# Patient Record
Sex: MALE | Race: WHITE | NOT HISPANIC OR LATINO
[De-identification: names, ages, dates, MRNs, and addresses within clinical notes are randomized per-mention and may not be internally consistent; named-entity substitution may affect disease eponyms.]

---

## 2017-05-11 ENCOUNTER — APPOINTMENT (OUTPATIENT)
Dept: CT IMAGING | Facility: CLINIC | Age: 77
End: 2017-05-11

## 2017-05-11 ENCOUNTER — OUTPATIENT (OUTPATIENT)
Dept: OUTPATIENT SERVICES | Facility: HOSPITAL | Age: 77
LOS: 1 days | End: 2017-05-11
Payer: MEDICARE

## 2017-05-11 DIAGNOSIS — Z00.8 ENCOUNTER FOR OTHER GENERAL EXAMINATION: ICD-10-CM

## 2017-05-11 PROCEDURE — 71250 CT THORAX DX C-: CPT

## 2017-05-22 ENCOUNTER — APPOINTMENT (OUTPATIENT)
Dept: NUCLEAR MEDICINE | Facility: CLINIC | Age: 77
End: 2017-05-22

## 2017-05-22 ENCOUNTER — OUTPATIENT (OUTPATIENT)
Dept: OUTPATIENT SERVICES | Facility: HOSPITAL | Age: 77
LOS: 1 days | End: 2017-05-22

## 2017-05-31 ENCOUNTER — APPOINTMENT (OUTPATIENT)
Dept: THORACIC SURGERY | Facility: CLINIC | Age: 77
End: 2017-05-31

## 2017-05-31 ENCOUNTER — RECORD ABSTRACTING (OUTPATIENT)
Age: 77
End: 2017-05-31

## 2017-05-31 VITALS
OXYGEN SATURATION: 97 % | HEART RATE: 68 BPM | DIASTOLIC BLOOD PRESSURE: 75 MMHG | SYSTOLIC BLOOD PRESSURE: 155 MMHG | HEIGHT: 70 IN | BODY MASS INDEX: 31.78 KG/M2 | RESPIRATION RATE: 16 BRPM | WEIGHT: 222 LBS

## 2017-05-31 DIAGNOSIS — G47.30 SLEEP APNEA, UNSPECIFIED: ICD-10-CM

## 2017-05-31 DIAGNOSIS — K44.9 DIAPHRAGMATIC HERNIA W/OUT OBSTRUCTION OR GANGRENE: ICD-10-CM

## 2017-05-31 DIAGNOSIS — C44.90 UNSPECIFIED MALIGNANT NEOPLASM OF SKIN, UNSPECIFIED: ICD-10-CM

## 2017-06-01 ENCOUNTER — APPOINTMENT (OUTPATIENT)
Dept: PULMONOLOGY | Facility: CLINIC | Age: 77
End: 2017-06-01

## 2017-06-01 VITALS
WEIGHT: 227 LBS | DIASTOLIC BLOOD PRESSURE: 74 MMHG | BODY MASS INDEX: 32.57 KG/M2 | HEART RATE: 69 BPM | SYSTOLIC BLOOD PRESSURE: 148 MMHG | OXYGEN SATURATION: 96 %

## 2017-06-01 DIAGNOSIS — C32.0 MALIGNANT NEOPLASM OF GLOTTIS: ICD-10-CM

## 2017-06-01 DIAGNOSIS — Z01.811 ENCOUNTER FOR PREPROCEDURAL RESPIRATORY EXAMINATION: ICD-10-CM

## 2017-06-05 ENCOUNTER — APPOINTMENT (OUTPATIENT)
Dept: THORACIC SURGERY | Facility: CLINIC | Age: 77
End: 2017-06-05

## 2017-06-05 VITALS
WEIGHT: 227 LBS | RESPIRATION RATE: 16 BRPM | BODY MASS INDEX: 32.5 KG/M2 | HEIGHT: 70 IN | HEART RATE: 76 BPM | DIASTOLIC BLOOD PRESSURE: 65 MMHG | SYSTOLIC BLOOD PRESSURE: 163 MMHG | OXYGEN SATURATION: 94 %

## 2017-06-14 ENCOUNTER — INPATIENT (INPATIENT)
Facility: HOSPITAL | Age: 77
LOS: 4 days | Discharge: HOME | DRG: 683 | End: 2017-06-19
Attending: INTERNAL MEDICINE | Admitting: HOSPITALIST
Payer: MEDICARE

## 2017-06-14 ENCOUNTER — OUTPATIENT (OUTPATIENT)
Dept: OUTPATIENT SERVICES | Facility: HOSPITAL | Age: 77
LOS: 1 days | End: 2017-06-14
Payer: MEDICARE

## 2017-06-14 VITALS
DIASTOLIC BLOOD PRESSURE: 53 MMHG | TEMPERATURE: 98 F | WEIGHT: 222.01 LBS | HEART RATE: 67 BPM | RESPIRATION RATE: 16 BRPM | OXYGEN SATURATION: 97 % | SYSTOLIC BLOOD PRESSURE: 109 MMHG | HEIGHT: 70 IN

## 2017-06-14 VITALS
HEART RATE: 99 BPM | TEMPERATURE: 99 F | RESPIRATION RATE: 16 BRPM | WEIGHT: 224.87 LBS | SYSTOLIC BLOOD PRESSURE: 142 MMHG | DIASTOLIC BLOOD PRESSURE: 79 MMHG | HEIGHT: 70 IN

## 2017-06-14 DIAGNOSIS — Z01.818 ENCOUNTER FOR OTHER PREPROCEDURAL EXAMINATION: ICD-10-CM

## 2017-06-14 DIAGNOSIS — Z90.49 ACQUIRED ABSENCE OF OTHER SPECIFIED PARTS OF DIGESTIVE TRACT: Chronic | ICD-10-CM

## 2017-06-14 DIAGNOSIS — R91.1 SOLITARY PULMONARY NODULE: ICD-10-CM

## 2017-06-14 DIAGNOSIS — C32.0 MALIGNANT NEOPLASM OF GLOTTIS: Chronic | ICD-10-CM

## 2017-06-14 DIAGNOSIS — N17.9 ACUTE KIDNEY FAILURE, UNSPECIFIED: ICD-10-CM

## 2017-06-14 DIAGNOSIS — Z96.659 PRESENCE OF UNSPECIFIED ARTIFICIAL KNEE JOINT: Chronic | ICD-10-CM

## 2017-06-14 DIAGNOSIS — R50.9 FEVER, UNSPECIFIED: ICD-10-CM

## 2017-06-14 LAB
ALBUMIN SERPL ELPH-MCNC: 3.4 G/DL — SIGNIFICANT CHANGE UP (ref 3.3–5.2)
ALP SERPL-CCNC: 60 U/L — SIGNIFICANT CHANGE UP (ref 40–120)
ALT FLD-CCNC: 20 U/L — SIGNIFICANT CHANGE UP
ANION GAP SERPL CALC-SCNC: 18 MMOL/L — HIGH (ref 5–17)
ANION GAP SERPL CALC-SCNC: 20 MMOL/L — HIGH (ref 5–17)
ANISOCYTOSIS BLD QL: SLIGHT — SIGNIFICANT CHANGE UP
APPEARANCE UR: ABNORMAL
APTT BLD: 27.9 SEC — SIGNIFICANT CHANGE UP (ref 27.5–37.4)
AST SERPL-CCNC: 19 U/L — SIGNIFICANT CHANGE UP
BACTERIA # UR AUTO: ABNORMAL
BILIRUB SERPL-MCNC: 1.3 MG/DL — SIGNIFICANT CHANGE UP (ref 0.4–2)
BILIRUB UR-MCNC: ABNORMAL
BUN SERPL-MCNC: 64 MG/DL — HIGH (ref 8–20)
BUN SERPL-MCNC: 72 MG/DL — HIGH (ref 8–20)
CALCIUM SERPL-MCNC: 8.8 MG/DL — SIGNIFICANT CHANGE UP (ref 8.6–10.2)
CALCIUM SERPL-MCNC: 8.8 MG/DL — SIGNIFICANT CHANGE UP (ref 8.6–10.2)
CHLORIDE SERPL-SCNC: 92 MMOL/L — LOW (ref 98–107)
CHLORIDE SERPL-SCNC: 93 MMOL/L — LOW (ref 98–107)
CO2 SERPL-SCNC: 18 MMOL/L — LOW (ref 22–29)
CO2 SERPL-SCNC: 19 MMOL/L — LOW (ref 22–29)
COLOR SPEC: YELLOW — SIGNIFICANT CHANGE UP
COMMENT - URINE: SIGNIFICANT CHANGE UP
CREAT SERPL-MCNC: 4.21 MG/DL — HIGH (ref 0.5–1.3)
CREAT SERPL-MCNC: 4.91 MG/DL — HIGH (ref 0.5–1.3)
DIFF PNL FLD: ABNORMAL
EPI CELLS # UR: SIGNIFICANT CHANGE UP
GLUCOSE SERPL-MCNC: 183 MG/DL — HIGH (ref 70–115)
GLUCOSE SERPL-MCNC: 194 MG/DL — HIGH (ref 70–115)
GLUCOSE UR QL: NEGATIVE MG/DL — SIGNIFICANT CHANGE UP
HBA1C BLD-MCNC: 7 % — HIGH (ref 4–5.6)
HCT VFR BLD CALC: 38 % — LOW (ref 42–52)
HGB BLD-MCNC: 12.8 G/DL — LOW (ref 14–18)
HYALINE CASTS # UR AUTO: 2 /LPF — SIGNIFICANT CHANGE UP (ref 0–7)
INR BLD: 1.2 RATIO — HIGH (ref 0.88–1.16)
KETONES UR-MCNC: NEGATIVE — SIGNIFICANT CHANGE UP
LEUKOCYTE ESTERASE UR-ACNC: ABNORMAL
LYMPHOCYTES # BLD AUTO: 8 % — LOW (ref 20–55)
MACROCYTES BLD QL: SLIGHT — SIGNIFICANT CHANGE UP
MCHC RBC-ENTMCNC: 30.2 PG — SIGNIFICANT CHANGE UP (ref 27–31)
MCHC RBC-ENTMCNC: 33.7 G/DL — SIGNIFICANT CHANGE UP (ref 32–36)
MCV RBC AUTO: 89.6 FL — SIGNIFICANT CHANGE UP (ref 80–94)
MICROCYTES BLD QL: SLIGHT — SIGNIFICANT CHANGE UP
MONOCYTES NFR BLD AUTO: 10 % — SIGNIFICANT CHANGE UP (ref 3–10)
NEUTROPHILS NFR BLD AUTO: 81 % — HIGH (ref 37–73)
NITRITE UR-MCNC: NEGATIVE — SIGNIFICANT CHANGE UP
OVALOCYTES BLD QL SMEAR: SLIGHT — SIGNIFICANT CHANGE UP
PH UR: 5 — SIGNIFICANT CHANGE UP (ref 5–8)
PLAT MORPH BLD: NORMAL — SIGNIFICANT CHANGE UP
PLATELET # BLD AUTO: 163 K/UL — SIGNIFICANT CHANGE UP (ref 150–400)
POIKILOCYTOSIS BLD QL AUTO: SLIGHT — SIGNIFICANT CHANGE UP
POTASSIUM SERPL-MCNC: 4.8 MMOL/L — SIGNIFICANT CHANGE UP (ref 3.5–5.3)
POTASSIUM SERPL-MCNC: 5 MMOL/L — SIGNIFICANT CHANGE UP (ref 3.5–5.3)
POTASSIUM SERPL-SCNC: 4.8 MMOL/L — SIGNIFICANT CHANGE UP (ref 3.5–5.3)
POTASSIUM SERPL-SCNC: 5 MMOL/L — SIGNIFICANT CHANGE UP (ref 3.5–5.3)
PROT SERPL-MCNC: 7.2 G/DL — SIGNIFICANT CHANGE UP (ref 6.6–8.7)
PROT UR-MCNC: 100 MG/DL
PROTHROM AB SERPL-ACNC: 13.3 SEC — HIGH (ref 9.8–12.7)
RBC # BLD: 4.24 M/UL — LOW (ref 4.6–6.2)
RBC # FLD: 13.4 % — SIGNIFICANT CHANGE UP (ref 11–15.6)
RBC BLD AUTO: ABNORMAL
RBC CASTS # UR COMP ASSIST: ABNORMAL /HPF (ref 0–4)
SODIUM SERPL-SCNC: 129 MMOL/L — LOW (ref 135–145)
SODIUM SERPL-SCNC: 131 MMOL/L — LOW (ref 135–145)
SP GR SPEC: 1.02 — SIGNIFICANT CHANGE UP (ref 1.01–1.02)
UROBILINOGEN FLD QL: NEGATIVE MG/DL — SIGNIFICANT CHANGE UP
VARIANT LYMPHS # BLD: 1 % — SIGNIFICANT CHANGE UP (ref 0–6)
WBC # BLD: 8.2 K/UL — SIGNIFICANT CHANGE UP (ref 4.8–10.8)
WBC # FLD AUTO: 8.2 K/UL — SIGNIFICANT CHANGE UP (ref 4.8–10.8)
WBC UR QL: SIGNIFICANT CHANGE UP

## 2017-06-14 PROCEDURE — 85610 PROTHROMBIN TIME: CPT

## 2017-06-14 PROCEDURE — 71020: CPT | Mod: 26

## 2017-06-14 PROCEDURE — 99285 EMERGENCY DEPT VISIT HI MDM: CPT

## 2017-06-14 PROCEDURE — 93005 ELECTROCARDIOGRAM TRACING: CPT

## 2017-06-14 PROCEDURE — 93010 ELECTROCARDIOGRAM REPORT: CPT

## 2017-06-14 PROCEDURE — 80048 BASIC METABOLIC PNL TOTAL CA: CPT

## 2017-06-14 PROCEDURE — 83036 HEMOGLOBIN GLYCOSYLATED A1C: CPT

## 2017-06-14 PROCEDURE — 71046 X-RAY EXAM CHEST 2 VIEWS: CPT

## 2017-06-14 PROCEDURE — 85730 THROMBOPLASTIN TIME PARTIAL: CPT

## 2017-06-14 PROCEDURE — 76775 US EXAM ABDO BACK WALL LIM: CPT | Mod: 26

## 2017-06-14 PROCEDURE — 85027 COMPLETE CBC AUTOMATED: CPT

## 2017-06-14 PROCEDURE — G0463: CPT

## 2017-06-14 RX ORDER — SODIUM CHLORIDE 9 MG/ML
1000 INJECTION INTRAMUSCULAR; INTRAVENOUS; SUBCUTANEOUS
Qty: 0 | Refills: 0 | Status: DISCONTINUED | OUTPATIENT
Start: 2017-06-14 | End: 2017-06-15

## 2017-06-14 RX ORDER — SODIUM CHLORIDE 9 MG/ML
1000 INJECTION INTRAMUSCULAR; INTRAVENOUS; SUBCUTANEOUS ONCE
Qty: 0 | Refills: 0 | Status: COMPLETED | OUTPATIENT
Start: 2017-06-14 | End: 2017-06-14

## 2017-06-14 RX ADMIN — SODIUM CHLORIDE 1000 MILLILITER(S): 9 INJECTION INTRAMUSCULAR; INTRAVENOUS; SUBCUTANEOUS at 18:58

## 2017-06-14 NOTE — ED ADULT NURSE NOTE - OBJECTIVE STATEMENT
pt arrives to ED ambulatory with reports of dark urine, decreased urine output and high kidney fx tests. states Creatinine 4.1 and BUN 62. pt AOX3, denies back pain, denies chest pain/SOB. even and unlabored resps present, skin warm and dry, states had the blood work this AM. was told in past to f/u with nephrology but unsure why. hx DM.

## 2017-06-14 NOTE — ED STATDOCS - PROGRESS NOTE DETAILS
77 y/o M pt presents to ED c/o abnormal labs. As per wife, he has been sick all week and was in pre surgical testing this morning because he is supposed to get a needle biopsy of his lung next week. He was told to go to the ED for further evaluation of a creatinine level of 4.2 and BUN level of 64. He notes he has been trembling, experiencing nausea, decreased PO intake, decreased urinary output, and fevers this week. Pt denies CP, SOB, vomiting, diarrhea, abdominal pain, dysuria, hematuria, and back pain. No further complaints at this time. NKDA. A brief and initial focused evaluation was performed in intake, pt protocol orders entered, pt placed in main for complete evaluation by another provider.

## 2017-06-14 NOTE — H&P PST ADULT - LAB RESULTS AND INTERPRETATION
bun 64 creatinine 4.21glucose 194, pt 13.3 inr 1.20  labs faxed and called to Dr. Cruz office , S/W constance ZEE. Aware of results creatinine from may 2017 <2.  email sent to Dr. Gibbons  Called pt and spoke with wife tess , denies any history of renal failure but said they have been watching because of diabetes. Informed wife of critical values , instructed her to bring  to ER. SHe aid they were coming now to ER at [facility].  2:30 pm 6/14/17. K D'Amico NP bun 64 creatinine 4.21glucose 194, pt 13.3 inr 1.20  labs faxed and called to Dr. Cruz office , S/W constance ZEE. Aware of results creatinine from may 2017 <2.  email sent to Dr. Gibbons  Called pt and spoke with wife tess , denies any history of renal failure but said they have been watching because of diabetes. Informed wife of critical values , instructed her to bring  to ER. SHe aid they were coming now to ER at [facility].  2:30 pm 6/14/17. K D'Amico NP s/w [place] in triage aware that pt will be coming in and history given. K D'Amico np bun 64 creatinine 4.21glucose 194, pt 13.3 inr 1.20  labs faxed and called to Dr. Cruz office , S/W constance ZEE. Aware of results creatinine from april 2017  1.68.  email sent to Dr. Gibbons  Called pt and spoke with wife tess , denies any history of renal failure but said they have been watching because of diabetes. Informed wife of critical values , instructed her to bring  to ER. SHe aid they were coming now to ER at [facility].  2:30 pm 6/14/17. K D'Amico NP s/w [place] in triage aware that pt will be coming in and history given. K D'Amico np

## 2017-06-14 NOTE — ED PROVIDER NOTE - OBJECTIVE STATEMENT
This patient is a 76 year old man who was sent to the ER for abnormal labs.  Patient had blood work drawn 2 days ago for clearance/pre-surgical testing for lung biopsy.  Patient states that in the past when living in [place] he had a nephrologist but his baseline creatinine was 1.6.  Today he was informed that his creatinine was 4.  Patient has been experiencing intermittent nausea and decrease appetite for the past 8 days. This patient is a 76 year old man who was sent to the ER for abnormal labs.  Patient had blood work drawn 2 days ago for clearance/pre-surgical testing for lung biopsy.  Patient states that in the past when living in [place] he had a nephrologist but his baseline creatinine was 1.6.  Today he was informed that his creatinine was 4.  Patient has been experiencing intermittent nausea and decrease appetite for the past 8 days.  His wife who is at bedside states that the symptoms began after he started incruse ellipta which was prescribed by his pulmonologist Dr. Daly.

## 2017-06-14 NOTE — H&P PST ADULT - OPHTHALMOLOGIC COMMENTS
pupils constricted bilaterally for years pt states he saw eye doctors in past , unsure of cause no treatment

## 2017-06-14 NOTE — H&P PST ADULT - ASSESSMENT
76 year old male with right lung nodule presents for Ct guided biopsy with Dr. Gibbons in radiology. Pt has history of HTN, Sleep apnea and anxiety. Pt started xanax on 6/12/17 due to anxiety regarding procedure, dc'd inhaler Ellipta on 6/12/17 due to increased nausea and fever. Pt very anxious, To see Dr. merino  in AM 6/15/17 for assessment , refused to go today .Denies any chest pain .Wife at side.

## 2017-06-14 NOTE — ED ADULT NURSE REASSESSMENT NOTE - NS ED NURSE REASSESS COMMENT FT1
pt resting in cart awaiting CT and bed assignment. pt in no distress, BP stable, strip placed in chart. even and unlabored resps

## 2017-06-14 NOTE — ED ADULT NURSE REASSESSMENT NOTE - NS ED NURSE REASSESS COMMENT FT1
pt moved to monitored bed, hypotensive, Dr Reddy made aware. pt placee on CM, NSR at this time with no complaints, denies any feeling of dizziness or pain. even and unlabored resps present.

## 2017-06-14 NOTE — ED ADULT TRIAGE NOTE - CHIEF COMPLAINT QUOTE
pt presents to ED sent by PMD for elevated creatine 4.21 and BUN 64. denies chest pain/sob. breathing si even and unlabored. pt had labs drawn for PST fro needle biopsy of lung nodule on Monday. pt c/o fever last night.  a&ox3

## 2017-06-14 NOTE — H&P PST ADULT - PMH
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

## 2017-06-14 NOTE — ASU PATIENT PROFILE, ADULT - PMH
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

## 2017-06-14 NOTE — ED ADULT NURSE NOTE - PMH
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

## 2017-06-14 NOTE — H&P PST ADULT - PROBLEM SELECTOR PLAN 2
to see Dr. De Guzman on 6/15 for assessment regarding fever and nausea  emailed DR. Gibbons and made aware   no medical clearance needed for radiology procedure

## 2017-06-14 NOTE — H&P PST ADULT - HISTORY OF PRESENT ILLNESS
76 year old male presents to PST with wife at side, pt anxious states he took a xanax prior to coming to PST. Pt had a right lung nodule noted on ct scan in FEB. 2016 with primary. Repeat CT scan in May 2016 had no change as per wife. 1 Year follow up Ct scan in MAY 2017 showed increase in size ,Pet scan done 5/22/17 suggested malignancy. Pt scheduled for biopsy  of right lung mass with MARIA ELENA Gibbons in radiology. Cough over past 1 year , clear sputum, dyspnea with exertion, Started inhaler Ellipta 1 week ago but dc'd on 6/12/17 because pt developed nausea, hand tremors  and fever of 102 ( for 2 days) afebrile today. Nausea persists , vomited small amount of clear liquid in PST. Pt denies chest pain , pt states he is feeling better now since taking xanax this am. Pt states he has history of anxiety prior to procedures. 76 year old male presents to PST with wife at side, pt anxious states he took a xanax prior to coming to PST. Pt had a right lung nodule noted on ct scan in FEB. 2016 with primary. Repeat CT scan in May 2016 had no change as per wife. 1 Year follow up Ct scan in MAY 2017 showed increase in size ,Pet scan done 5/22/17 suggested malignancy. Pt scheduled for biopsy  of right lung mass with DR. Gibbons in radiology. Cough over past 1 year , clear sputum, dyspnea with exertion, Started inhaler Ellipta 1 week ago but dc'd on 6/12/17 because pt developed nausea, hand tremors  and fever of 102 ( for 2 days) afebrile today. Nausea persists , vomited small amount of clear liquid in PST. Pt denies chest pain , pt states he is feeling better now since taking xanax this am. Pt states he has history of anxiety prior to procedures.

## 2017-06-14 NOTE — H&P PST ADULT - FAMILY HISTORY
Mother  Still living? No  Family history of liver disease, Age at diagnosis: Age Unknown     Father  Still living? No  Family history of esophageal cancer, Age at diagnosis: Age Unknown

## 2017-06-15 DIAGNOSIS — E87.2 ACIDOSIS: ICD-10-CM

## 2017-06-15 DIAGNOSIS — I10 ESSENTIAL (PRIMARY) HYPERTENSION: ICD-10-CM

## 2017-06-15 DIAGNOSIS — R91.1 SOLITARY PULMONARY NODULE: ICD-10-CM

## 2017-06-15 DIAGNOSIS — N17.9 ACUTE KIDNEY FAILURE, UNSPECIFIED: ICD-10-CM

## 2017-06-15 DIAGNOSIS — E11.21 TYPE 2 DIABETES MELLITUS WITH DIABETIC NEPHROPATHY: ICD-10-CM

## 2017-06-15 DIAGNOSIS — E87.1 HYPO-OSMOLALITY AND HYPONATREMIA: ICD-10-CM

## 2017-06-15 DIAGNOSIS — E11.9 TYPE 2 DIABETES MELLITUS WITHOUT COMPLICATIONS: ICD-10-CM

## 2017-06-15 DIAGNOSIS — J44.9 CHRONIC OBSTRUCTIVE PULMONARY DISEASE, UNSPECIFIED: ICD-10-CM

## 2017-06-15 DIAGNOSIS — D64.9 ANEMIA, UNSPECIFIED: ICD-10-CM

## 2017-06-15 DIAGNOSIS — C32.0 MALIGNANT NEOPLASM OF GLOTTIS: ICD-10-CM

## 2017-06-15 DIAGNOSIS — R50.9 FEVER, UNSPECIFIED: ICD-10-CM

## 2017-06-15 DIAGNOSIS — E03.9 HYPOTHYROIDISM, UNSPECIFIED: ICD-10-CM

## 2017-06-15 LAB
ALBUMIN SERPL ELPH-MCNC: 3.3 G/DL — SIGNIFICANT CHANGE UP (ref 3.3–5.2)
ALP SERPL-CCNC: 55 U/L — SIGNIFICANT CHANGE UP (ref 40–120)
ALT FLD-CCNC: 18 U/L — SIGNIFICANT CHANGE UP
ANION GAP SERPL CALC-SCNC: 18 MMOL/L — HIGH (ref 5–17)
AST SERPL-CCNC: 18 U/L — SIGNIFICANT CHANGE UP
BILIRUB SERPL-MCNC: 1 MG/DL — SIGNIFICANT CHANGE UP (ref 0.4–2)
BUN SERPL-MCNC: 71 MG/DL — HIGH (ref 8–20)
CALCIUM SERPL-MCNC: 8.1 MG/DL — LOW (ref 8.6–10.2)
CHLORIDE SERPL-SCNC: 99 MMOL/L — SIGNIFICANT CHANGE UP (ref 98–107)
CHOLEST SERPL-MCNC: 113 MG/DL — SIGNIFICANT CHANGE UP (ref 110–199)
CO2 SERPL-SCNC: 16 MMOL/L — LOW (ref 22–29)
CREAT SERPL-MCNC: 4.14 MG/DL — HIGH (ref 0.5–1.3)
CULTURE RESULTS: NO GROWTH — SIGNIFICANT CHANGE UP
EOSINOPHIL NFR BLD AUTO: 1 % — SIGNIFICANT CHANGE UP (ref 0–6)
FERRITIN SERPL-MCNC: 217.8 NG/ML — SIGNIFICANT CHANGE UP (ref 30–400)
GLUCOSE SERPL-MCNC: 151 MG/DL — HIGH (ref 70–115)
HCT VFR BLD CALC: 33.4 % — LOW (ref 42–52)
HDLC SERPL-MCNC: 13 MG/DL — LOW
HGB BLD-MCNC: 11.5 G/DL — LOW (ref 14–18)
IRON SATN MFR SERPL: 22 UG/DL — LOW (ref 59–158)
IRON SATN MFR SERPL: 8 % — LOW (ref 16–55)
LIPID PNL WITH DIRECT LDL SERPL: 44 MG/DL — SIGNIFICANT CHANGE UP
LYMPHOCYTES # BLD AUTO: 0.7 K/UL — LOW (ref 1–4.8)
LYMPHOCYTES # BLD AUTO: 13 % — LOW (ref 20–55)
MAGNESIUM SERPL-MCNC: 2.3 MG/DL — SIGNIFICANT CHANGE UP (ref 1.6–2.6)
MCHC RBC-ENTMCNC: 30.4 PG — SIGNIFICANT CHANGE UP (ref 27–31)
MCHC RBC-ENTMCNC: 34.4 G/DL — SIGNIFICANT CHANGE UP (ref 32–36)
MCV RBC AUTO: 88.4 FL — SIGNIFICANT CHANGE UP (ref 80–94)
MONOCYTES # BLD AUTO: 0.4 K/UL — SIGNIFICANT CHANGE UP (ref 0–0.8)
MONOCYTES NFR BLD AUTO: 8 % — SIGNIFICANT CHANGE UP (ref 3–10)
NEUTROPHILS # BLD AUTO: 4.4 K/UL — SIGNIFICANT CHANGE UP (ref 1.8–8)
NEUTROPHILS NFR BLD AUTO: 75 % — HIGH (ref 37–73)
NEUTS BAND # BLD: 3 % — SIGNIFICANT CHANGE UP (ref 0–8)
OSMOLALITY SERPL: 270 MOS/KG — LOW (ref 275–300)
OSMOLALITY UR: 246 MOSM/KG — LOW (ref 300–1000)
PHOSPHATE SERPL-MCNC: 3.7 MG/DL — SIGNIFICANT CHANGE UP (ref 2.4–4.7)
PLAT MORPH BLD: NORMAL — SIGNIFICANT CHANGE UP
PLATELET # BLD AUTO: 159 K/UL — SIGNIFICANT CHANGE UP (ref 150–400)
POTASSIUM SERPL-MCNC: 4.6 MMOL/L — SIGNIFICANT CHANGE UP (ref 3.5–5.3)
POTASSIUM SERPL-SCNC: 4.6 MMOL/L — SIGNIFICANT CHANGE UP (ref 3.5–5.3)
PROT SERPL-MCNC: 6.6 G/DL — SIGNIFICANT CHANGE UP (ref 6.6–8.7)
RBC # BLD: 3.78 M/UL — LOW (ref 4.6–6.2)
RBC # FLD: 13.5 % — SIGNIFICANT CHANGE UP (ref 11–15.6)
RBC BLD AUTO: SIGNIFICANT CHANGE UP
SODIUM SERPL-SCNC: 133 MMOL/L — LOW (ref 135–145)
SODIUM UR-SCNC: 47 MMOL/L — SIGNIFICANT CHANGE UP
SPECIMEN SOURCE: SIGNIFICANT CHANGE UP
T3 SERPL-MCNC: 52 NG/DL — LOW (ref 80–200)
T4 AB SER-ACNC: 5 UG/DL — SIGNIFICANT CHANGE UP (ref 4.5–12)
TIBC SERPL-MCNC: 272 UG/DL — SIGNIFICANT CHANGE UP (ref 220–430)
TOTAL CHOLESTEROL/HDL RATIO MEASUREMENT: 9 RATIO — SIGNIFICANT CHANGE UP (ref 3.4–9.6)
TRANSFERRIN SERPL-MCNC: 190 MG/DL — SIGNIFICANT CHANGE UP (ref 180–329)
TRIGL SERPL-MCNC: 281 MG/DL — HIGH (ref 10–200)
TSH SERPL-MCNC: 2.19 UIU/ML — SIGNIFICANT CHANGE UP (ref 0.27–4.2)
WBC # BLD: 5.6 K/UL — SIGNIFICANT CHANGE UP (ref 4.8–10.8)
WBC # FLD AUTO: 5.6 K/UL — SIGNIFICANT CHANGE UP (ref 4.8–10.8)

## 2017-06-15 PROCEDURE — 74176 CT ABD & PELVIS W/O CONTRAST: CPT | Mod: 26

## 2017-06-15 PROCEDURE — 99223 1ST HOSP IP/OBS HIGH 75: CPT

## 2017-06-15 PROCEDURE — 99223 1ST HOSP IP/OBS HIGH 75: CPT | Mod: GC

## 2017-06-15 RX ORDER — HYDRALAZINE HCL 50 MG
10 TABLET ORAL THREE TIMES A DAY
Qty: 0 | Refills: 0 | Status: DISCONTINUED | OUTPATIENT
Start: 2017-06-15 | End: 2017-06-19

## 2017-06-15 RX ORDER — PANTOPRAZOLE SODIUM 20 MG/1
40 TABLET, DELAYED RELEASE ORAL
Qty: 0 | Refills: 0 | Status: DISCONTINUED | OUTPATIENT
Start: 2017-06-15 | End: 2017-06-19

## 2017-06-15 RX ORDER — ALPRAZOLAM 0.25 MG
0.5 TABLET ORAL EVERY 8 HOURS
Qty: 0 | Refills: 0 | Status: DISCONTINUED | OUTPATIENT
Start: 2017-06-15 | End: 2017-06-19

## 2017-06-15 RX ORDER — DEXTROSE 50 % IN WATER 50 %
12.5 SYRINGE (ML) INTRAVENOUS ONCE
Qty: 0 | Refills: 0 | Status: DISCONTINUED | OUTPATIENT
Start: 2017-06-15 | End: 2017-06-19

## 2017-06-15 RX ORDER — HYDRALAZINE HCL 50 MG
10 TABLET ORAL EVERY 6 HOURS
Qty: 0 | Refills: 0 | Status: DISCONTINUED | OUTPATIENT
Start: 2017-06-15 | End: 2017-06-19

## 2017-06-15 RX ORDER — SODIUM BICARBONATE 1 MEQ/ML
0.12 SYRINGE (ML) INTRAVENOUS
Qty: 150 | Refills: 0 | Status: DISCONTINUED | OUTPATIENT
Start: 2017-06-15 | End: 2017-06-17

## 2017-06-15 RX ORDER — INSULIN LISPRO 100/ML
10 VIAL (ML) SUBCUTANEOUS
Qty: 0 | Refills: 0 | Status: DISCONTINUED | OUTPATIENT
Start: 2017-06-15 | End: 2017-06-17

## 2017-06-15 RX ORDER — SIMVASTATIN 20 MG/1
40 TABLET, FILM COATED ORAL AT BEDTIME
Qty: 0 | Refills: 0 | Status: DISCONTINUED | OUTPATIENT
Start: 2017-06-15 | End: 2017-06-19

## 2017-06-15 RX ORDER — DEXTROSE 50 % IN WATER 50 %
1 SYRINGE (ML) INTRAVENOUS ONCE
Qty: 0 | Refills: 0 | Status: DISCONTINUED | OUTPATIENT
Start: 2017-06-15 | End: 2017-06-19

## 2017-06-15 RX ORDER — LEVOTHYROXINE SODIUM 125 MCG
75 TABLET ORAL DAILY
Qty: 0 | Refills: 0 | Status: DISCONTINUED | OUTPATIENT
Start: 2017-06-15 | End: 2017-06-19

## 2017-06-15 RX ORDER — INSULIN GLARGINE 100 [IU]/ML
30 INJECTION, SOLUTION SUBCUTANEOUS AT BEDTIME
Qty: 0 | Refills: 0 | Status: DISCONTINUED | OUTPATIENT
Start: 2017-06-15 | End: 2017-06-17

## 2017-06-15 RX ORDER — ALBUTEROL 90 UG/1
2.5 AEROSOL, METERED ORAL
Qty: 0 | Refills: 0 | Status: DISCONTINUED | OUTPATIENT
Start: 2017-06-15 | End: 2017-06-19

## 2017-06-15 RX ORDER — INSULIN LISPRO 100/ML
VIAL (ML) SUBCUTANEOUS
Qty: 0 | Refills: 0 | Status: DISCONTINUED | OUTPATIENT
Start: 2017-06-15 | End: 2017-06-19

## 2017-06-15 RX ORDER — TAMSULOSIN HYDROCHLORIDE 0.4 MG/1
0.4 CAPSULE ORAL AT BEDTIME
Qty: 0 | Refills: 0 | Status: DISCONTINUED | OUTPATIENT
Start: 2017-06-15 | End: 2017-06-19

## 2017-06-15 RX ORDER — GLUCAGON INJECTION, SOLUTION 0.5 MG/.1ML
1 INJECTION, SOLUTION SUBCUTANEOUS ONCE
Qty: 0 | Refills: 0 | Status: DISCONTINUED | OUTPATIENT
Start: 2017-06-15 | End: 2017-06-19

## 2017-06-15 RX ORDER — ACETYLCYSTEINE 200 MG/ML
1200 VIAL (ML) MISCELLANEOUS EVERY 12 HOURS
Qty: 0 | Refills: 0 | Status: DISCONTINUED | OUTPATIENT
Start: 2017-06-15 | End: 2017-06-15

## 2017-06-15 RX ORDER — DEXTROSE 50 % IN WATER 50 %
25 SYRINGE (ML) INTRAVENOUS ONCE
Qty: 0 | Refills: 0 | Status: DISCONTINUED | OUTPATIENT
Start: 2017-06-15 | End: 2017-06-19

## 2017-06-15 RX ORDER — SODIUM CHLORIDE 9 MG/ML
1000 INJECTION, SOLUTION INTRAVENOUS
Qty: 0 | Refills: 0 | Status: DISCONTINUED | OUTPATIENT
Start: 2017-06-15 | End: 2017-06-19

## 2017-06-15 RX ORDER — INSULIN LISPRO 100/ML
10 VIAL (ML) SUBCUTANEOUS
Qty: 0 | Refills: 0 | Status: DISCONTINUED | OUTPATIENT
Start: 2017-06-15 | End: 2017-06-15

## 2017-06-15 RX ADMIN — Medication 80 MEQ/KG/HR: at 18:30

## 2017-06-15 RX ADMIN — Medication 10 UNIT(S): at 12:30

## 2017-06-15 RX ADMIN — TAMSULOSIN HYDROCHLORIDE 0.4 MILLIGRAM(S): 0.4 CAPSULE ORAL at 22:59

## 2017-06-15 RX ADMIN — Medication 10 MILLIGRAM(S): at 05:04

## 2017-06-15 RX ADMIN — INSULIN GLARGINE 30 UNIT(S): 100 INJECTION, SOLUTION SUBCUTANEOUS at 22:59

## 2017-06-15 RX ADMIN — Medication 10 UNIT(S): at 18:28

## 2017-06-15 RX ADMIN — SODIUM CHLORIDE 100 MILLILITER(S): 9 INJECTION INTRAMUSCULAR; INTRAVENOUS; SUBCUTANEOUS at 12:30

## 2017-06-15 RX ADMIN — Medication 10 UNIT(S): at 09:29

## 2017-06-15 RX ADMIN — Medication 0.5 MILLIGRAM(S): at 12:30

## 2017-06-15 RX ADMIN — Medication 1: at 22:59

## 2017-06-15 RX ADMIN — SIMVASTATIN 40 MILLIGRAM(S): 20 TABLET, FILM COATED ORAL at 23:01

## 2017-06-15 RX ADMIN — Medication 10 MILLIGRAM(S): at 23:16

## 2017-06-15 RX ADMIN — Medication 0.5 MILLIGRAM(S): at 23:01

## 2017-06-15 RX ADMIN — SODIUM CHLORIDE 100 MILLILITER(S): 9 INJECTION INTRAMUSCULAR; INTRAVENOUS; SUBCUTANEOUS at 01:22

## 2017-06-15 RX ADMIN — Medication 1: at 18:29

## 2017-06-15 RX ADMIN — Medication 10 MILLIGRAM(S): at 12:30

## 2017-06-15 RX ADMIN — Medication 75 MICROGRAM(S): at 05:04

## 2017-06-15 RX ADMIN — PANTOPRAZOLE SODIUM 40 MILLIGRAM(S): 20 TABLET, DELAYED RELEASE ORAL at 05:04

## 2017-06-15 NOTE — PROGRESS NOTE ADULT - ASSESSMENT
This is a 76 yr old male with PMH of COPD on CPAP (no home oxygen, never intubated), DM on insulin, HTN, HLD, lung nodule, vocal cord cancer, GEORGE, hypothyroidism, GERD, hemorrhoids, anxiety admitted for acute on chronic renal failure stage 5.  Patient appears to be experiencing anticholinergic toxicity from the ellipta.  This is causing a postrenal type obstruction and decline in renal function.  Patient was also taking metformin for his DM which is likely contributing to the renal failure.  Other possibilities include possible malignancy or renal stones.  Nephritis syndrome is also a possibility given hx of HTN and RBC seen on UA and urinary retention.  Patient appears improved clinically.

## 2017-06-15 NOTE — CONSULT NOTE ADULT - SUBJECTIVE AND OBJECTIVE BOX
Patient is a 76y old  Male who presents with a chief complaint of Sent for abnormal BUN/Cr (15 Alexander 2017 00:13)      HPI:  This is a 76 yr old male with PMH of COPD on CPAP (no home oxygen, never intubated), DM on insulin, HTN, HLD, lung nodule, vocal cord cancer, GEORGE, hypothyroidism, GERD, hemorrhoids, anxiety presented to [facility] ER after presurgical testing with abnormal labs.  Patient was found to have BUN/Cr 64.0/4.21.  Patient was previously following with a nephrologist in [place] for 15 yrs and moved here in  and has not seen one since. Patient states that was has had a lung mass which has increased in size and was sent for testing prior to CT guided biopsy by Dr Gibbons.  Patient was sent to Dr Daly pulmonologist by Dr Collins for medical optimization and was started on Ellipta 7 days ago.  Ever since he began taking the medication, patient reported nausea with dry heaving, decreased appetite, urinary retention, dry mouth, with increased bloating.  Last BM was 2 days ago with worsening SOB on exertion.  Patient has also been coughing with clear phlegm over the last year. Patient is active at the gym and participates in silver sneaCustExs.  There was a reported fever of 102 but currently the patient complains of being cold and feeling chills.  Patient denies chest pain,  dysuria, flank pain, hematuria, weight loss, fatigue, melena.  Patient states that he had a colonoscopy performed 4-5 years ago. (15 Alexander 2017 00:13)      PAST MEDICAL & SURGICAL HISTORY:  Constricted pupils: for several years states he was seen by many eye doctors no cause known  Hypertension  Sleep apnea: 2012 done in [place] studies  cpcp at home  High cholesterol  Nausea alone: over past week  Fever and chills: 102  17  and 17  no fever since then discontinued ellipta  Pneumonia symptoms: pnuemonia 2011  Anxiety: before pocedures  ETOH abuse: last drink 28 yrs ago   AA meeting weekly  Vocal cord cancer:  treated with chemo  Hypothyroid  Diabetes  Cough: non productive  Lung nodule: right 2016   repeat ct scan 2017 increased  size  Cancer of vocal cord: 1996 biopsy  S/P cholecystectomy  S/P knee replacement: bilateral      FAMILY HISTORY:  Family history of esophageal cancer (Father)  Family history of liver disease (Mother)  No pertinent family history in first degree relatives      Social History:    MEDICATIONS  (STANDING):  simvastatin 40milliGRAM(s) Oral at bedtime  levothyroxine 75MICROGram(s) Oral daily  insulin lispro (HumaLOG) corrective regimen sliding scale  SubCutaneous Before meals and at bedtime  dextrose 5%. 1000milliLiter(s) IV Continuous <Continuous>  dextrose 50% Injectable 12.5Gram(s) IV Push once  dextrose 50% Injectable 25Gram(s) IV Push once  dextrose 50% Injectable 25Gram(s) IV Push once  pantoprazole    Tablet 40milliGRAM(s) Oral before breakfast  insulin glargine Injectable (LANTUS) 30Unit(s) SubCutaneous at bedtime  insulin lispro Injectable (HumaLOG) 10Unit(s) SubCutaneous three times a day before meals  hydrALAZINE 10milliGRAM(s) Oral three times a day  tamsulosin 0.4milliGRAM(s) Oral at bedtime  sodium bicarbonate  Infusion 0.119mEq/kG/Hr IV Continuous <Continuous>    MEDICATIONS  (PRN):  ALBUTerol    0.083% 2.5milliGRAM(s) Nebulizer every 2 hours PRN Shortness of Breath and/or Wheezing  dextrose Gel 1Dose(s) Oral once PRN Blood Glucose LESS THAN 70 milliGRAM(s)/deciliter  glucagon  Injectable 1milliGRAM(s) IntraMuscular once PRN Glucose LESS THAN 70 milligrams/deciliter  hydrALAZINE Injectable 10milliGRAM(s) IV Push every 6 hours PRN SBP>160 or DBP>110  ALPRAZolam 0.5milliGRAM(s) Oral every 8 hours PRN severe anxiety      Allergies    No Known Allergies    Intolerances        REVIEW OF SYSTEMS:    CONSTITUTIONAL: No fever, weight loss, or fatigue  EYES: No eye pain, visual disturbances, or discharge  ENMT:  No difficulty hearing, tinnitus, vertigo; No sinus or throat pain  NECK: No pain or stiffness  BREASTS: No pain, masses, or nipple discharge  RESPIRATORY: No cough, wheezing, chills or hemoptysis; No shortness of breath  CARDIOVASCULAR: No chest pain, palpitations, dizziness, or leg swelling  GASTROINTESTINAL: No abdominal or epigastric pain. No nausea, vomiting, or hematemesis; No diarrhea or constipation. No melena or hematochezia.  GENITOURINARY: No dysuria, frequency, hematuria, or incontinence  NEUROLOGICAL: No headaches, memory loss, loss of strength, numbness, or tremors  SKIN: No itching, burning, rashes, or lesions   LYMPH NODES: No enlarged glands  ENDOCRINE: No heat or cold intolerance; No hair loss  MUSCULOSKELETAL: No joint pain or swelling; No muscle, back, or extremity pain  PSYCHIATRIC: No depression, anxiety, mood swings, or difficulty sleeping  HEME/LYMPH: No easy bruising, or bleeding gums  ALLERY AND IMMUNOLOGIC: No hives or eczema      Vital Signs Last 24 Hrs  T(C): 37.2, Max: 37.4 (06-15 @ 08:03)  T(F): 98.9, Max: 99.3 (06-15 @ 08:03)  HR: 78 (60 - 100)  BP: 108/54 (90/51 - 160/66)  BP(mean): --  RR: 18 (16 - 18)  SpO2: 96% (92% - 97%)    PHYSICAL EXAM:    GENERAL: NAD, well-groomed, well-developed  HEAD:  Atraumatic, Normocephalic  EYES: EOMI, PERRLA, conjunctiva and sclera clear  ENMT: No tonsillar erythema, exudates, or enlargement; Moist mucous membranes, Good dentition, No lesions  NECK: Supple, No JVD, Normal thyroid  NERVOUS SYSTEM:  Alert & Oriented X3, Good concentration; Motor Strength 5/5 B/L upper and lower extremities; DTRs 2+ intact and symmetric  CHEST/LUNG: Clear to percussion bilaterally; No rales, rhonchi, wheezing, or rubs  HEART: Regular rate and rhythm; No murmurs, rubs, or gallops  ABDOMEN: Soft, Nontender, Nondistended; Bowel sounds present  EXTREMITIES:  2+ Peripheral Pulses, No clubbing, cyanosis, or edema  LYMPH: No lymphadenopathy noted  SKIN: No rashes or lesions      LABS:                        11.5   5.6   )-----------( 159      ( 15 Alexander 2017 06:35 )             33.4     06-15    133<L>  |  99  |  71.0<H>  ----------------------------<  151<H>  4.6   |  16.0<L>  |  4.14<H>    Ca    8.1<L>      15 Alexander 2017 06:35  Phos  3.7     06-15  Mg     2.3     06-15    TPro  6.6  /  Alb  3.3  /  TBili  1.0  /  DBili  x   /  AST  18  /  ALT  18  /  AlkPhos  55  06-15    PT/INR - ( 2017 11:35 )   PT: 13.3 sec;   INR: 1.20 ratio         PTT - ( 2017 11:35 )  PTT:27.9 sec  Urinalysis Basic - ( 2017 18:53 )    Color: Yellow / Appearance: Slightly Turbid / S.025 / pH: x  Gluc: x / Ketone: Negative  / Bili: Small / Urobili: Negative mg/dL   Blood: x / Protein: 100 mg/dL / Nitrite: Negative   Leuk Esterase: Trace / RBC: 11-25 /HPF / WBC 3-5   Sq Epi: x / Non Sq Epi: Occasional / Bacteria: Few      Magnesium, Serum: 2.3 mg/dL (06-15 @ 06:35)  Phosphorus Level, Serum: 3.7 mg/dL (06-15 @ 06:35)      RADIOLOGY & ADDITIONAL TESTS:

## 2017-06-15 NOTE — PROGRESS NOTE ADULT - PROBLEM SELECTOR PLAN 5
Patient initially with BP 90/51 on admission with temp 96.7  Patient reported fever few days ago of 102F.  Ellipta was stopped 2 days ago.  Likely anticholinergic effect resolving.  No clinical signs of infection.

## 2017-06-15 NOTE — CONSULT NOTE ADULT - PROBLEM SELECTOR RECOMMENDATION 9
Post renal obstruction (Obstructive Uropathy)  Urine studies - Check Protein/Creatinine Ratio and Urinalysis  BMP in am

## 2017-06-15 NOTE — H&P ADULT - PROBLEM SELECTOR PLAN 9
Patient does not appear to be in COPD exacerbation.  Will hold duonebs in light of possible anticholinergic toxicity.  Albuterol Q2 PRN Patient does not appear to be in COPD exacerbation.  Will hold duonebs in light of possible anticholinergic toxicity.  Albuterol Q2 PRN  Sputum culture due to chronic cough.  Oxygen PRN to maintain O2 saturation above 92%

## 2017-06-15 NOTE — CONSULT NOTE ADULT - ASSESSMENT
76 yr old male with PMH of COPD on CPAP (no home oxygen, never intubated), DM on insulin, HTN, HLD, lung nodule, vocal cord cancer, GEORGE, hypothyroidism, GERD, hemorrhoids, anxiety. Was following up with a Nephrologist in [place] for 15 years before moving here in 2014. Long standing CKD due to Diabetic Nephropathy. Now with Acute on Chronic Renal Failure due to Obstructive Uropathy, also presenting with Metabolic acidosis

## 2017-06-15 NOTE — H&P ADULT - HISTORY OF PRESENT ILLNESS
This is a 76 yr old male with PMH of COPD on CPAP (no home oxygen, never intubated), DM on insulin, HTN, HLD, lung nodule, vocal cord cancer, GEORGE, hypothyroidism, GERD, hemorrhoids, anxiety presented to [facility] ER after presurgical testing with abnormal labs.  Patient was found to have BUN/Cr 64.0/4.21.  Patient was previously following with a nephrologist in [place] for 15 yrs and moved here in 2014 and has not seen one since. Patient states that was has had a lung mass which has increased in size and was sent for testing prior to CT guided biopsy by Dr Gibbons.  Patient was sent to Dr Daly pulmonologist by Dr Collins for medical optimization and was started on Ellipta 7 days ago.  Ever since he began taking the medication, patient reported nausea with dry heaving, decreased appetite, urinary retention, dry mouth, with increased bloating.  Last BM was 2 days ago with occasional SOB on exertion.  Patient has also been coughing with clear phlegm over the last year. Patient is active at the gym and participates in silver sneakers.  There was a reported fever of 102 but currently the patient complains of being cold and feeling chills.  Patient denies chest pain,  dysuria, flank pain, hematuria, weight loss, fatigue, melena.  Patient states that he had a colonoscopy performed 4-5 years ago and that his wife would know better.  Attempted to call the wife Lillie 3 times at [phone] but line was busy. This is a 76 yr old male with PMH of COPD on CPAP (no home oxygen, never intubated), DM on insulin, HTN, HLD, lung nodule, vocal cord cancer, GEORGE, hypothyroidism, GERD, hemorrhoids, anxiety presented to [facility] ER after presurgical testing with abnormal labs.  Patient was found to have BUN/Cr 64.0/4.21.  Patient was previously following with a nephrologist in [place] for 15 yrs and moved here in 2014 and has not seen one since. Patient states that was has had a lung mass which has increased in size and was sent for testing prior to CT guided biopsy by Dr Gibbons.  Patient was sent to Dr Daly pulmonologist by Dr Collins for medical optimization and was started on Ellipta 7 days ago.  Ever since he began taking the medication, patient reported nausea with dry heaving, decreased appetite, urinary retention, dry mouth, with increased bloating.  Last BM was 2 days ago with worsening SOB on exertion.  Patient has also been coughing with clear phlegm over the last year. Patient is active at the gym and participates in silver sneakers.  There was a reported fever of 102 but currently the patient complains of being cold and feeling chills.  Patient denies chest pain,  dysuria, flank pain, hematuria, weight loss, fatigue, melena.  Patient states that he had a colonoscopy performed 4-5 years ago.

## 2017-06-15 NOTE — H&P ADULT - PROBLEM SELECTOR PLAN 3
Insulin SS for now.  Will need to contact wife and pharmacy in the AM to confirm insulin dosage  HBa1c 7.0  Appears well controlled. Insulin SS   Lantus 30units QHS  Humalog 10 units premeal  HBa1c 7.0  Appears well controlled.

## 2017-06-15 NOTE — H&P ADULT - PROBLEM SELECTOR PLAN 1
Acute of chronic kidney failure stage 5.  Renal U/S was unremarkable  Pending CT Abd/pelvis  UA showed 100 protein, large blood, few bacteria, trace leukocyte esterase, 11-25 RBC  Urine culture pending Acute of chronic kidney failure stage 5.  Renal U/S was unremarkable for hydronephrosis, mass, or stones.  Pending CT Abd/pelvis  UA showed 100 protein, large blood, few bacteria, trace leukocyte esterase, 11-25 RBC  Urine culture pending  Renal Dr Peters consulted.  Patient appears dry clinically.  Received 1 liter bolus NS in the ER  Continue NS at 100 ml/hr Acute of chronic kidney failure stage 5.  Renal U/S was unremarkable for hydronephrosis, mass, or stones.  Pending CT Abd/pelvis  UA showed 100 protein, large blood, few bacteria, trace leukocyte esterase, 11-25 RBC  Urine culture pending  Renal Dr Peters consulted.  Patient appears dry clinically.  Received 1 liter bolus NS in the ER  Continue NS at 100 ml/hr  Rodney catheter  Strict I/O's

## 2017-06-15 NOTE — H&P ADULT - PROBLEM SELECTOR PLAN 6
Possibly secondary to pulmonary disease.  Patient has a history of lung mass.  Will repeat in the AM. Possibly secondary to pulmonary disease.  Patient has a history of lung mass.  NS at 100ml/hr   Will repeat in the AM. Possibly secondary to pulmonary disease.  Patient has a history of lung mass.  NS at 100ml/hr   Urine Na  Will repeat in the AM. Possibly secondary to pulmonary disease.  Patient has a history of lung mass.  NS at 100ml/hr   Urine Na  Serum osmolarity  Urine osmolarity  Will repeat in the AM.

## 2017-06-15 NOTE — H&P ADULT - PROBLEM SELECTOR PLAN 5
Blood Culture  Urine Culture  Lactate  Patient still reporting chills, temperature 96.7, will repeat temp.  Bear hugger Patient initially with BP 90/51 on admission with temp 96.7  Patient reported fever few days ago of 102F.  Ellipta was stopped 2 days ago.  Likely anticholinergic effect resolving.  Sputum culture due to chronic cough. Patient initially with BP 90/51 on admission with temp 96.7  Patient reported fever few days ago of 102F.  Ellipta was stopped 2 days ago.  Likely anticholinergic effect resolving.

## 2017-06-15 NOTE — PROGRESS NOTE ADULT - PROBLEM SELECTOR PLAN 2
BP 90/51->112/58->166/63->141/72  Patient previously on valsartan/HCTZ 80-12.5  Will hold ARB/HCTZ combo in light of JANI  Will start on PO hydralazine 10mg TID with holding parameters  Hydralazine 10mg IV PRN Q6 for SBP > 160, DBP>110

## 2017-06-15 NOTE — H&P ADULT - NSHPLABSRESULTS_GEN_ALL_CORE
129<L>  |  93<L>  |  72.0<H>  ----------------------------<  183<H>  5.0   |  18.0<L>  |  4.91<H>    Ca    8.8      2017 19:06    TPro  7.2  /  Alb  3.4  /  TBili  1.3  /  DBili  x   /  AST  19  /  ALT  20  /  AlkPhos  60                            12.8   8.2   )-----------( 163      ( 2017 11:35 )             38.0       PT/INR - ( 2017 11:35 )   PT: 13.3 sec;   INR: 1.20 ratio         PTT - ( 2017 11:35 )  PTT:27.9 sec  Urinalysis Basic - ( 2017 18:53 )    Color: Yellow / Appearance: Slightly Turbid / S.025 / pH: x  Gluc: x / Ketone: Negative  / Bili: Small / Urobili: Negative mg/dL   Blood: x / Protein: 100 mg/dL / Nitrite: Negative   Leuk Esterase: Trace / RBC: 11-25 /HPF / WBC 3-5   Sq Epi: x / Non Sq Epi: Occasional / Bacteria: Few      LIVER FUNCTIONS - ( 2017 19:06 )  Alb: 3.4 g/dL / Pro: 7.2 g/dL / ALK PHOS: 60 U/L / ALT: 20 U/L / AST: 19 U/L / GGT: x               CAPILLARY BLOOD GLUCOSE        Renal US  FINDINGS: The right kidney is normal in echogenicity and size, measuring   11.6 cm in longitudinal dimension. No right hydronephrosis, mass or   calculi is visualized.     The left kidney is normal in echogenicity and size, measuring 1.6 cm in   longitudinal dimension. No left hydronephrosis, mass or calculi is   visualized.     The bladder is not optimally distended.      IMPRESSION:  Unremarkable renal sonography.    CXR     Findings:  There is a hiatus hernia. Atelectasis or fibrosis at the right lung base.   The left lung is otherwise clear. The right lung is otherwise clear. The   heart is not enlarged. No hilar or mediastinal abnormality. No evidence   of a pleural effusion. There are degenerative changes in the spine.    Impression:  Large hiatus hernia. Atelectasis or fibrosis at the right lung base..    PET CT  IMPRESSION:        1. FDG avid right upper lobe nodule suspicious for malignancy. No   evidence of FDG avid distant metastatic disease.    2. Calcified and noncalcified bilateral pleural thickening, non-FDG avid.    CT Chest no cont  IMPRESSION:     1.  Ill-defined opacity within the apical segment of the right upper lobe   has increased in size (prior 1.5 x 2.4 cm) since 2016 and is   concerning for primary lung cancer.  2.  Emphysema.  3.  No change in the pleural thickening calcification along the right   hemithorax.  4.  No change in the pleural thickening along the left hemithorax.

## 2017-06-15 NOTE — PROGRESS NOTE ADULT - SUBJECTIVE AND OBJECTIVE BOX
cc: 76 yr old male sent to the ER for elevated BUN/Cr at presurgical testing.     Patient seen and examined at bedside, No acute overnight events.  Patient feels a lot better after insertion of thakur catheter.  Drained ~550cc.  Denies any chest pain, SOB, NVD, dysuria, headache, dizziness, palpitations, flank pain.  The chills have resolved and the patient has been afebrile.      VS: Vital Signs Last 24 Hrs  T(C): 36.9, Max: 36.9 (06-15 @ 04:18)  T(F): 98.4, Max: 98.4 (06-15 @ 04:18)  HR: 100 (60 - 100)  BP: 141/72 (90/51 - 160/66)  BP(mean): --  RR: 18 (16 - 18)  SpO2: 95% (95% - 97%)    Physical Exam: PHYSICAL EXAM:    General: NAD, resting comfortably in bed  HEENT: NC/AT, PERRLA, EOMI  Neck: thyroid normal, no nodules, no lymphadenopathy, no JVD  Lungs: crackles heard at right lung base, no wheezes,  Cardio: S1S2+, RRR, no murmurs  Abdominal: soft, NT, BS+, distended  : thakur in place  Back: no CVA tenderness, no ulcers visualized in the back  Extremities: no edema, no calf tenderness, no ulcers in the feet  Neuro: AAOx3, CN 2-12 grossly intact.  sensation intact in all extremities, 5/5 strength      Labs:                        12.8   8.2   )-----------( 163      ( 14 Jun 2017 11:35 )             38.0   06-14    129<L>  |  93<L>  |  72.0<H>  ----------------------------<  183<H>  5.0   |  18.0<L>  |  4.91<H>    Ca    8.8      14 Jun 2017 19:06    TPro  7.2  /  Alb  3.4  /  TBili  1.3  /  DBili  x   /  AST  19  /  ALT  20  /  AlkPhos  60  06-14      Medications:  MEDICATIONS  (STANDING):  sodium chloride 0.9%. 1000milliLiter(s) IV Continuous <Continuous>  simvastatin 40milliGRAM(s) Oral at bedtime  levothyroxine 75MICROGram(s) Oral daily  insulin lispro (HumaLOG) corrective regimen sliding scale  SubCutaneous Before meals and at bedtime  dextrose 5%. 1000milliLiter(s) IV Continuous <Continuous>  dextrose 50% Injectable 12.5Gram(s) IV Push once  dextrose 50% Injectable 25Gram(s) IV Push once  dextrose 50% Injectable 25Gram(s) IV Push once  pantoprazole    Tablet 40milliGRAM(s) Oral before breakfast  insulin glargine Injectable (LANTUS) 30Unit(s) SubCutaneous at bedtime  insulin lispro Injectable (HumaLOG) 10Unit(s) SubCutaneous three times a day before meals  hydrALAZINE 10milliGRAM(s) Oral three times a day    MEDICATIONS  (PRN):  ALBUTerol    0.083% 2.5milliGRAM(s) Nebulizer every 2 hours PRN Shortness of Breath and/or Wheezing  dextrose Gel 1Dose(s) Oral once PRN Blood Glucose LESS THAN 70 milliGRAM(s)/deciliter  glucagon  Injectable 1milliGRAM(s) IntraMuscular once PRN Glucose LESS THAN 70 milligrams/deciliter  hydrALAZINE Injectable 10milliGRAM(s) IV Push every 6 hours PRN SBP>160 or DBP>110    Renal US  FINDINGS: The right kidney is normal in echogenicity and size, measuring   11.6 cm in longitudinal dimension. No right hydronephrosis, mass or   calculi is visualized.     The left kidney is normal in echogenicity and size, measuring 1.6 cm in   longitudinal dimension. No left hydronephrosis, mass or calculi is   visualized.     The bladder is not optimally distended.    IMPRESSION:  Unremarkable renal sonography.    CXR  Findings:  There is a hiatus hernia. Atelectasis or fibrosis at the right lung base.   The left lung is otherwise clear. The right lung is otherwise clear. The   heart is not enlarged. No hilar or mediastinal abnormality. No evidence   of a pleural effusion. There are degenerative changes in the spine.    Impression:  Large hiatus hernia. Atelectasis or fibrosis at the right lung base..

## 2017-06-15 NOTE — H&P ADULT - ASSESSMENT
This is a 76 yr old male with PMH of COPD on CPAP (no home oxygen, never intubated), DM on insulin, HTN, HLD, lung nodule, vocal cord cancer, GEORGE, hypothyroidism, GERD, hemorrhoids, anxiety admitted for acute on chronic renal failure stage 5.  Patient appears to be experiencing anticholinergic toxicity from the ellipta.  This is causing a postrenal type obstruction and decline in renal function.  Patient is also taking metformin for his DM which is likely contributing to the renal failure.  Other possibilities include possible malignancy or renal stones.  Nephritis syndrome is also a possibility given hx of HTN and RBC seen on UA and urinary retention    Admit to Medicine Resident Service under Dr Edward JOINER TLC renal diet  Ambulate as tolerated  Vitals per routine This is a 76 yr old male with PMH of COPD on CPAP (no home oxygen, never intubated), DM on insulin, HTN, HLD, lung nodule, vocal cord cancer, GEORGE, hypothyroidism, GERD, hemorrhoids, anxiety admitted for acute on chronic renal failure stage 5.  Patient appears to be experiencing anticholinergic toxicity from the ellipta.  This is causing a postrenal type obstruction and decline in renal function.  Patient is also taking metformin for his DM which is likely contributing to the renal failure.  Other possibilities include possible malignancy or renal stones.  Nephritis syndrome is also a possibility given hx of HTN and RBC seen on UA and urinary retention.    Admit to Medicine Resident Service under Dr Leon any bed  DASH TLC consistent carb diet  Ambulate as tolerated  Vitals per routine

## 2017-06-15 NOTE — H&P ADULT - FAMILY HISTORY
Father  Still living? Unknown  Family history of esophageal cancer, Age at diagnosis: Age Unknown     Mother  Still living? Unknown  Family history of liver disease, Age at diagnosis: Age Unknown

## 2017-06-15 NOTE — PROGRESS NOTE ADULT - PROBLEM SELECTOR PLAN 6
Possibly secondary to pulmonary disease.  Patient has a history of lung mass.  NS at 100ml/hr   Urine Na  Serum osmolarity  Urine osmolarity  Will repeat in the AM.

## 2017-06-15 NOTE — PROGRESS NOTE ADULT - PROBLEM SELECTOR PLAN 1
Acute of chronic kidney failure stage 5.  Renal U/S was unremarkable for hydronephrosis, mass, or stones.  CT Abd/pelvis performed, pending official read  UA showed 100 protein, large blood, few bacteria, trace leukocyte esterase, 11-25 RBC  Urine culture pending  Renal Dr Peters consulted.  Patient appears dry clinically.  Received 1 liter bolus NS in the ER  Continue NS at 100 ml/hr  Rodney catheter  Strict I/O's

## 2017-06-15 NOTE — H&P ADULT - NSHPPHYSICALEXAM_GEN_ALL_CORE
Vital Signs Last 24 Hrs  T(C): 35.9, Max: 36.4 (06-14 @ 15:15)  T(F): 96.7, Max: 97.6 (06-14 @ 15:15)  HR: 60 (60 - 67)  BP: 160/66 (90/51 - 160/66)  BP(mean): --  RR: 18 (16 - 18)  SpO2: 96% (95% - 97%) Vital Signs Last 24 Hrs  T(C): 35.9, Max: 36.4 (06-14 @ 15:15)  T(F): 96.7, Max: 97.6 (06-14 @ 15:15)  HR: 60 (60 - 67)  BP: 160/66 (90/51 - 160/66)  BP(mean): --  RR: 18 (16 - 18)  SpO2: 96% (95% - 97%)    General: NAD, resting comfortably in bed  HEENT: NC/AT, PERRLA, EOMI  Neck: thyroid normal, no nodules, no lymphadenopathy, no JVD  Lungs: crackles heard at right lung base, no wheezes,  Cardio: S1S2+, RRR, no murmurs  Abdominal: soft, NT, BS+, distended  : thakur in place  Back: no CVA tenderness, no ulcers visualized in the back  Extremities: no edema, no calf tenderness, no ulcers in the feet  Neuro: AAOx3, CN 2-12 grossly intact.  sensation intact in all extremities, 5/5 strength

## 2017-06-16 DIAGNOSIS — N13.9 OBSTRUCTIVE AND REFLUX UROPATHY, UNSPECIFIED: ICD-10-CM

## 2017-06-16 LAB
ALBUMIN SERPL ELPH-MCNC: 3 G/DL — LOW (ref 3.3–5.2)
ALP SERPL-CCNC: 54 U/L — SIGNIFICANT CHANGE UP (ref 40–120)
ALT FLD-CCNC: 21 U/L — SIGNIFICANT CHANGE UP
ANION GAP SERPL CALC-SCNC: 15 MMOL/L — SIGNIFICANT CHANGE UP (ref 5–17)
APPEARANCE UR: CLEAR — SIGNIFICANT CHANGE UP
AST SERPL-CCNC: 18 U/L — SIGNIFICANT CHANGE UP
BILIRUB SERPL-MCNC: 0.8 MG/DL — SIGNIFICANT CHANGE UP (ref 0.4–2)
BILIRUB UR-MCNC: NEGATIVE — SIGNIFICANT CHANGE UP
BUN SERPL-MCNC: 62 MG/DL — HIGH (ref 8–20)
CALCIUM SERPL-MCNC: 8.2 MG/DL — LOW (ref 8.6–10.2)
CHLORIDE SERPL-SCNC: 104 MMOL/L — SIGNIFICANT CHANGE UP (ref 98–107)
CHLORIDE UR-SCNC: 56 MMOL/L — SIGNIFICANT CHANGE UP
CO2 SERPL-SCNC: 20 MMOL/L — LOW (ref 22–29)
COLOR SPEC: YELLOW — SIGNIFICANT CHANGE UP
COMMENT - URINE: SIGNIFICANT CHANGE UP
CREAT ?TM UR-MCNC: 80 MG/DL — SIGNIFICANT CHANGE UP
CREAT SERPL-MCNC: 3.11 MG/DL — HIGH (ref 0.5–1.3)
DIFF PNL FLD: ABNORMAL
EPI CELLS # UR: ABNORMAL
GLUCOSE SERPL-MCNC: 169 MG/DL — HIGH (ref 70–115)
GLUCOSE UR QL: NEGATIVE MG/DL — SIGNIFICANT CHANGE UP
GRAN CASTS # UR COMP ASSIST: SIGNIFICANT CHANGE UP /LPF
KETONES UR-MCNC: NEGATIVE — SIGNIFICANT CHANGE UP
LEUKOCYTE ESTERASE UR-ACNC: ABNORMAL
NITRITE UR-MCNC: NEGATIVE — SIGNIFICANT CHANGE UP
PH UR: 5 — SIGNIFICANT CHANGE UP (ref 5–8)
POTASSIUM SERPL-MCNC: 4.2 MMOL/L — SIGNIFICANT CHANGE UP (ref 3.5–5.3)
POTASSIUM SERPL-SCNC: 4.2 MMOL/L — SIGNIFICANT CHANGE UP (ref 3.5–5.3)
PROT ?TM UR-MCNC: 19 MG/DL — HIGH (ref 0–12)
PROT SERPL-MCNC: 6.3 G/DL — LOW (ref 6.6–8.7)
PROT UR-MCNC: 15 MG/DL
PROT/CREAT UR-RTO: 0.23 RATIO — SIGNIFICANT CHANGE UP
RBC CASTS # UR COMP ASSIST: ABNORMAL /HPF (ref 0–4)
SODIUM SERPL-SCNC: 139 MMOL/L — SIGNIFICANT CHANGE UP (ref 135–145)
SP GR SPEC: 1.01 — SIGNIFICANT CHANGE UP (ref 1.01–1.02)
UROBILINOGEN FLD QL: NEGATIVE MG/DL — SIGNIFICANT CHANGE UP
WBC UR QL: SIGNIFICANT CHANGE UP /HPF (ref 0–5)

## 2017-06-16 PROCEDURE — 99233 SBSQ HOSP IP/OBS HIGH 50: CPT

## 2017-06-16 PROCEDURE — 99233 SBSQ HOSP IP/OBS HIGH 50: CPT | Mod: GC

## 2017-06-16 RX ORDER — CEFTRIAXONE 500 MG/1
INJECTION, POWDER, FOR SOLUTION INTRAMUSCULAR; INTRAVENOUS
Qty: 0 | Refills: 0 | Status: DISCONTINUED | OUTPATIENT
Start: 2017-06-16 | End: 2017-06-17

## 2017-06-16 RX ORDER — CEFTRIAXONE 500 MG/1
1 INJECTION, POWDER, FOR SOLUTION INTRAMUSCULAR; INTRAVENOUS ONCE
Qty: 0 | Refills: 0 | Status: COMPLETED | OUTPATIENT
Start: 2017-06-16 | End: 2017-06-16

## 2017-06-16 RX ORDER — DOCUSATE SODIUM 100 MG
100 CAPSULE ORAL THREE TIMES A DAY
Qty: 0 | Refills: 0 | Status: DISCONTINUED | OUTPATIENT
Start: 2017-06-16 | End: 2017-06-19

## 2017-06-16 RX ORDER — IRON SUCROSE 20 MG/ML
100 INJECTION, SOLUTION INTRAVENOUS DAILY
Qty: 0 | Refills: 0 | Status: COMPLETED | OUTPATIENT
Start: 2017-06-16 | End: 2017-06-18

## 2017-06-16 RX ORDER — CEFTRIAXONE 500 MG/1
1 INJECTION, POWDER, FOR SOLUTION INTRAMUSCULAR; INTRAVENOUS EVERY 24 HOURS
Qty: 0 | Refills: 0 | Status: DISCONTINUED | OUTPATIENT
Start: 2017-06-17 | End: 2017-06-17

## 2017-06-16 RX ORDER — SENNA PLUS 8.6 MG/1
2 TABLET ORAL AT BEDTIME
Qty: 0 | Refills: 0 | Status: DISCONTINUED | OUTPATIENT
Start: 2017-06-16 | End: 2017-06-19

## 2017-06-16 RX ORDER — POLYETHYLENE GLYCOL 3350 17 G/17G
17 POWDER, FOR SOLUTION ORAL DAILY
Qty: 0 | Refills: 0 | Status: DISCONTINUED | OUTPATIENT
Start: 2017-06-16 | End: 2017-06-19

## 2017-06-16 RX ADMIN — Medication 10 MILLIGRAM(S): at 14:46

## 2017-06-16 RX ADMIN — Medication 0.5 MILLIGRAM(S): at 17:07

## 2017-06-16 RX ADMIN — INSULIN GLARGINE 30 UNIT(S): 100 INJECTION, SOLUTION SUBCUTANEOUS at 21:19

## 2017-06-16 RX ADMIN — Medication 75 MICROGRAM(S): at 05:19

## 2017-06-16 RX ADMIN — Medication 2: at 12:50

## 2017-06-16 RX ADMIN — Medication 3: at 17:08

## 2017-06-16 RX ADMIN — SIMVASTATIN 40 MILLIGRAM(S): 20 TABLET, FILM COATED ORAL at 23:05

## 2017-06-16 RX ADMIN — IRON SUCROSE 210 MILLIGRAM(S): 20 INJECTION, SOLUTION INTRAVENOUS at 12:08

## 2017-06-16 RX ADMIN — Medication 10 MILLIGRAM(S): at 23:05

## 2017-06-16 RX ADMIN — Medication 10 UNIT(S): at 08:50

## 2017-06-16 RX ADMIN — Medication 1: at 08:51

## 2017-06-16 RX ADMIN — Medication 100 MILLIGRAM(S): at 14:45

## 2017-06-16 RX ADMIN — Medication 10 MILLIGRAM(S): at 05:20

## 2017-06-16 RX ADMIN — SENNA PLUS 2 TABLET(S): 8.6 TABLET ORAL at 23:05

## 2017-06-16 RX ADMIN — Medication 3: at 21:18

## 2017-06-16 RX ADMIN — Medication 100 MILLIGRAM(S): at 23:05

## 2017-06-16 RX ADMIN — Medication 80 MEQ/KG/HR: at 11:23

## 2017-06-16 RX ADMIN — CEFTRIAXONE 100 GRAM(S): 500 INJECTION, POWDER, FOR SOLUTION INTRAMUSCULAR; INTRAVENOUS at 11:22

## 2017-06-16 RX ADMIN — Medication 0.5 MILLIGRAM(S): at 08:56

## 2017-06-16 RX ADMIN — TAMSULOSIN HYDROCHLORIDE 0.4 MILLIGRAM(S): 0.4 CAPSULE ORAL at 23:05

## 2017-06-16 RX ADMIN — PANTOPRAZOLE SODIUM 40 MILLIGRAM(S): 20 TABLET, DELAYED RELEASE ORAL at 05:20

## 2017-06-16 RX ADMIN — Medication 10 UNIT(S): at 12:50

## 2017-06-16 RX ADMIN — Medication 10 UNIT(S): at 17:08

## 2017-06-16 NOTE — PROGRESS NOTE ADULT - ATTENDING COMMENTS
is a 77 y/o male w/PMhx of lung lesion in the RUL, who was supposed to go in for IR biopsy but was found to be in acute on chronic renal failure. His wife reports he had chills, nausea and vomiting and I suspect the had a UTI resulting in JANI from UTI w/sepsis. His wife reported he's on an "anticholinergic" inhaler which resulted in urinary obstruction, however, his CT scan demonstrates no signs of hydronephrosis, and the placement of his thakur did not result in immediate creatinine improvement. He's getting better, he's on rocephin but his u/a and urine culture do not demonstrate a UTI, but given his presentation of some hematuria, urgency, chills, nausea and vomiting and his improvement on fluids, with bicarb, I suspect it was an underlying infection. His wife and him fully agree w/the plan of care. We're waiting for improvement in creatinine and  can do the bx of his lung mass on Monday.

## 2017-06-16 NOTE — PROGRESS NOTE ADULT - PROBLEM SELECTOR PLAN 1
Acute of chronic kidney failure stage 5.  Renal U/S was unremarkable for hydronephrosis, mass, or stones.  CT Abd/pelvis: No acute findings. Bilateral perinephric fat stranding. Bladder   moderately distended, mildly enlarged prostate, correlate with urine   output and PSA levels.  UA showed 100 protein, large blood, few bacteria, trace leukocyte esterase, 11-25 RBC  Urine culture no growth  Renal Dr Peters following  Sodium Bicarb infusion  Rodney catheter  Strict I/O's

## 2017-06-16 NOTE — PROGRESS NOTE ADULT - SUBJECTIVE AND OBJECTIVE BOX
NEPHROLOGY INTERVAL HPI/OVERNIGHT EVENTS: No acute events overnight    MEDICATIONS  (STANDING):  simvastatin 40milliGRAM(s) Oral at bedtime  levothyroxine 75MICROGram(s) Oral daily  insulin lispro (HumaLOG) corrective regimen sliding scale  SubCutaneous Before meals and at bedtime  dextrose 5%. 1000milliLiter(s) IV Continuous <Continuous>  dextrose 50% Injectable 12.5Gram(s) IV Push once  dextrose 50% Injectable 25Gram(s) IV Push once  dextrose 50% Injectable 25Gram(s) IV Push once  pantoprazole    Tablet 40milliGRAM(s) Oral before breakfast  insulin glargine Injectable (LANTUS) 30Unit(s) SubCutaneous at bedtime  insulin lispro Injectable (HumaLOG) 10Unit(s) SubCutaneous three times a day before meals  hydrALAZINE 10milliGRAM(s) Oral three times a day  tamsulosin 0.4milliGRAM(s) Oral at bedtime  sodium bicarbonate  Infusion 0.119mEq/kG/Hr IV Continuous <Continuous>  iron sucrose IVPB 100milliGRAM(s) IV Intermittent daily  cefTRIAXone   IVPB  IV Intermittent   senna 2Tablet(s) Oral at bedtime  docusate sodium 100milliGRAM(s) Oral three times a day    MEDICATIONS  (PRN):  ALBUTerol    0.083% 2.5milliGRAM(s) Nebulizer every 2 hours PRN Shortness of Breath and/or Wheezing  dextrose Gel 1Dose(s) Oral once PRN Blood Glucose LESS THAN 70 milliGRAM(s)/deciliter  glucagon  Injectable 1milliGRAM(s) IntraMuscular once PRN Glucose LESS THAN 70 milligrams/deciliter  hydrALAZINE Injectable 10milliGRAM(s) IV Push every 6 hours PRN SBP>160 or DBP>110  ALPRAZolam 0.5milliGRAM(s) Oral every 8 hours PRN severe anxiety  polyethylene glycol 3350 17Gram(s) Oral daily PRN Constipation      Allergies    No Known Allergies    Intolerances          REVIEW OF SYSTEMS:    CONSTITUTIONAL: No fever, weight loss, or fatigue  EYES: No eye pain, visual disturbances, or discharge  ENMT:  No difficulty hearing, tinnitus, vertigo; No sinus or throat pain  NECK: No pain or stiffness  BREASTS: No pain, masses, or nipple discharge  RESPIRATORY: No cough, wheezing, chills or hemoptysis; No shortness of breath  CARDIOVASCULAR: No chest pain, palpitations, dizziness, or leg swelling  GASTROINTESTINAL: No abdominal or epigastric pain. No nausea, vomiting, or hematemesis; No diarrhea or constipation. No melena or hematochezia.  GENITOURINARY: No dysuria, frequency, hematuria, or incontinence  NEUROLOGICAL: No headaches, memory loss, loss of strength, numbness, or tremors  SKIN: No itching, burning, rashes, or lesions   LYMPH NODES: No enlarged glands  ENDOCRINE: No heat or cold intolerance; No hair loss  MUSCULOSKELETAL: No joint pain or swelling; No muscle, back, or extremity pain  PSYCHIATRIC: No depression, anxiety, mood swings, or difficulty sleeping  HEME/LYMPH: No easy bruising, or bleeding gums  ALLERY AND IMMUNOLOGIC: No hives or eczema      Vital Signs Last 24 Hrs  T(C): 37.2, Max: 37.2 (06-15 @ 15:57)  T(F): 98.9, Max: 98.9 (06-15 @ 15:57)  HR: 70 (70 - 80)  BP: 114/66 (108/54 - 135/74)  BP(mean): 79 (79 - 79)  RR: 19 (17 - 20)  SpO2: 96% (94% - 96%)    PHYSICAL EXAM:    GENERAL: NAD, well-groomed, well-developed  HEAD:  Atraumatic, Normocephalic  EYES: EOMI, PERRLA, conjunctiva and sclera clear  NECK: Supple, No JVD, Normal thyroid  NERVOUS SYSTEM:  Alert & Oriented X3, Good concentration; Motor Strength 5/5 B/L upper and lower extremities  CHEST/LUNG: Clear to percussion bilaterally; No rales, rhonchi, wheezing, or rubs  HEART: Regular rate and rhythm; No murmurs, rubs, or gallops  ABDOMEN: Soft, Nontender, Nondistended; Bowel sounds present  EXTREMITIES:  2+ Peripheral Pulses, No clubbing, cyanosis, or edema  LYMPH: No lymphadenopathy noted  SKIN: No rashes or lesions    LABS:                        11.5   5.6   )-----------( 159      ( 15 Alexander 2017 06:35 )             33.4     06-16    139  |  104  |  62.0<H>  ----------------------------<  169<H>  4.2   |  20.0<L>  |  3.11<H>    Ca    8.2<L>      2017 07:50  Phos  3.7     06-15  Mg     2.3     06-15    TPro  6.3<L>  /  Alb  3.0<L>  /  TBili  0.8  /  DBili  x   /  AST  18  /  ALT  21  /  AlkPhos  54  06-16    PT/INR - ( 2017 11:35 )   PT: 13.3 sec;   INR: 1.20 ratio         PTT - ( 2017 11:35 )  PTT:27.9 sec  Urinalysis Basic - ( 2017 09:03 )    Color: Yellow / Appearance: Clear / S.010 / pH: x  Gluc: x / Ketone: Negative  / Bili: Negative / Urobili: Negative mg/dL   Blood: x / Protein: 15 mg/dL / Nitrite: Negative   Leuk Esterase: Trace / RBC: 11-25 /HPF / WBC 10-15 /HPF   Sq Epi: x / Non Sq Epi: Moderate / Bacteria: x          RADIOLOGY & ADDITIONAL TESTS:

## 2017-06-16 NOTE — PROGRESS NOTE ADULT - SUBJECTIVE AND OBJECTIVE BOX
cc: 76 yr old male sent to the ER for elevated BUN/Cr at presurgical testing.     Patient seen and examined at bedside, No acute overnight events.  Patient feels a lot better.  Denies any chest pain, SOB, NVD, dysuria, headache, dizziness, palpitations, flank pain, fever, chills.  Patient thakur drained 175cc overnight.  Patient is comfortable and slept well.    VS: Vital Signs Last 24 Hrs  T(C): 36.6, Max: 37.4 (06-15 @ 08:03)  T(F): 97.9, Max: 99.3 (06-15 @ 08:03)  HR: 77 (71 - 88)  BP: 135/74 (108/54 - 135/74)  BP(mean): 79 (79 - 79)  RR: 20 (16 - 20)  SpO2: 96% (92% - 96%)  Physical Exam: PHYSICAL EXAM:    General: NAD, resting comfortably in bed  HEENT: NC/AT, PERRLA, EOMI  Neck: thyroid normal, no nodules, no lymphadenopathy, no JVD  Lungs: crackles heard at right lung base, no wheezes,  Cardio: S1S2+, RRR, no murmurs  Abdominal: soft, NT, BS+, distended  : thakur in place  Back: no CVA tenderness, no ulcers visualized in the back  Extremities: no edema, no calf tenderness, no ulcers in the feet  Neuro: AAOx3, CN 2-12 grossly intact.  sensation intact in all extremities, 5/5 strength        Labs:                        11.5   5.6   )-----------( 159      ( 15 Alexander 2017 06:35 )             33.4   06-15    133<L>  |  99  |  71.0<H>  ----------------------------<  151<H>  4.6   |  16.0<L>  |  4.14<H>    Ca    8.1<L>      15 Alexander 2017 06:35  Phos  3.7     06-15  Mg     2.3     06-15    TPro  6.6  /  Alb  3.3  /  TBili  1.0  /  DBili  x   /  AST  18  /  ALT  18  /  AlkPhos  55  06-15      Medications:  MEDICATIONS  (STANDING):  simvastatin 40milliGRAM(s) Oral at bedtime  levothyroxine 75MICROGram(s) Oral daily  insulin lispro (HumaLOG) corrective regimen sliding scale  SubCutaneous Before meals and at bedtime  dextrose 5%. 1000milliLiter(s) IV Continuous <Continuous>  dextrose 50% Injectable 12.5Gram(s) IV Push once  dextrose 50% Injectable 25Gram(s) IV Push once  dextrose 50% Injectable 25Gram(s) IV Push once  pantoprazole    Tablet 40milliGRAM(s) Oral before breakfast  insulin glargine Injectable (LANTUS) 30Unit(s) SubCutaneous at bedtime  insulin lispro Injectable (HumaLOG) 10Unit(s) SubCutaneous three times a day before meals  hydrALAZINE 10milliGRAM(s) Oral three times a day  tamsulosin 0.4milliGRAM(s) Oral at bedtime  sodium bicarbonate  Infusion 0.119mEq/kG/Hr IV Continuous <Continuous>  iron sucrose IVPB 100milliGRAM(s) IV Intermittent daily    MEDICATIONS  (PRN):  ALBUTerol    0.083% 2.5milliGRAM(s) Nebulizer every 2 hours PRN Shortness of Breath and/or Wheezing  dextrose Gel 1Dose(s) Oral once PRN Blood Glucose LESS THAN 70 milliGRAM(s)/deciliter  glucagon  Injectable 1milliGRAM(s) IntraMuscular once PRN Glucose LESS THAN 70 milligrams/deciliter  hydrALAZINE Injectable 10milliGRAM(s) IV Push every 6 hours PRN SBP>160 or DBP>110  ALPRAZolam 0.5milliGRAM(s) Oral every 8 hours PRN severe anxiety    CT abd pelvis  IMPRESSION:     No acute findings. Bilateral perinephric fat stranding. Bladder   moderately distended, mildly enlarged prostate, correlate with urine   output and PSA levels. cc: 76 yr old male sent to the ER for elevated BUN/Cr at presurgical testing.     Patient seen and examined at bedside, No acute overnight events.  Patient feels a lot better.  Denies any chest pain, SOB, NVD, dysuria, headache, dizziness, palpitations, flank pain, fever, chills.  Patient thakur drained 800cc overnight.  Patient is comfortable and slept well.    VS: Vital Signs Last 24 Hrs  T(C): 36.6, Max: 37.4 (06-15 @ 08:03)  T(F): 97.9, Max: 99.3 (06-15 @ 08:03)  HR: 77 (71 - 88)  BP: 135/74 (108/54 - 135/74)  BP(mean): 79 (79 - 79)  RR: 20 (16 - 20)  SpO2: 96% (92% - 96%)  Physical Exam: PHYSICAL EXAM:    General: NAD, resting comfortably in bed  HEENT: NC/AT, PERRLA, EOMI  Neck: thyroid normal, no nodules, no lymphadenopathy, no JVD  Lungs: crackles heard at right lung base, no wheezes,  Cardio: S1S2+, RRR, no murmurs  Abdominal: soft, NT, BS+, distended  : thakur in place  Back: no CVA tenderness, no ulcers visualized in the back  Extremities: no edema, no calf tenderness, no ulcers in the feet  Neuro: AAOx3, CN 2-12 grossly intact.  sensation intact in all extremities, 5/5 strength        Labs:                        11.5   5.6   )-----------( 159      ( 15 Alexander 2017 06:35 )             33.4   06-15    133<L>  |  99  |  71.0<H>  ----------------------------<  151<H>  4.6   |  16.0<L>  |  4.14<H>    Ca    8.1<L>      15 Alexander 2017 06:35  Phos  3.7     06-15  Mg     2.3     06-15    TPro  6.6  /  Alb  3.3  /  TBili  1.0  /  DBili  x   /  AST  18  /  ALT  18  /  AlkPhos  55  06-15      Medications:  MEDICATIONS  (STANDING):  simvastatin 40milliGRAM(s) Oral at bedtime  levothyroxine 75MICROGram(s) Oral daily  insulin lispro (HumaLOG) corrective regimen sliding scale  SubCutaneous Before meals and at bedtime  dextrose 5%. 1000milliLiter(s) IV Continuous <Continuous>  dextrose 50% Injectable 12.5Gram(s) IV Push once  dextrose 50% Injectable 25Gram(s) IV Push once  dextrose 50% Injectable 25Gram(s) IV Push once  pantoprazole    Tablet 40milliGRAM(s) Oral before breakfast  insulin glargine Injectable (LANTUS) 30Unit(s) SubCutaneous at bedtime  insulin lispro Injectable (HumaLOG) 10Unit(s) SubCutaneous three times a day before meals  hydrALAZINE 10milliGRAM(s) Oral three times a day  tamsulosin 0.4milliGRAM(s) Oral at bedtime  sodium bicarbonate  Infusion 0.119mEq/kG/Hr IV Continuous <Continuous>  iron sucrose IVPB 100milliGRAM(s) IV Intermittent daily    MEDICATIONS  (PRN):  ALBUTerol    0.083% 2.5milliGRAM(s) Nebulizer every 2 hours PRN Shortness of Breath and/or Wheezing  dextrose Gel 1Dose(s) Oral once PRN Blood Glucose LESS THAN 70 milliGRAM(s)/deciliter  glucagon  Injectable 1milliGRAM(s) IntraMuscular once PRN Glucose LESS THAN 70 milligrams/deciliter  hydrALAZINE Injectable 10milliGRAM(s) IV Push every 6 hours PRN SBP>160 or DBP>110  ALPRAZolam 0.5milliGRAM(s) Oral every 8 hours PRN severe anxiety    CT abd pelvis  IMPRESSION:     No acute findings. Bilateral perinephric fat stranding. Bladder   moderately distended, mildly enlarged prostate, correlate with urine   output and PSA levels.

## 2017-06-16 NOTE — PROGRESS NOTE ADULT - ASSESSMENT
This is a 76 yr old male with PMH of COPD on CPAP (no home oxygen, never intubated), DM on insulin, HTN, HLD, lung nodule, vocal cord cancer, GEORGE, hypothyroidism, GERD, hemorrhoids, anxiety admitted for acute on chronic renal failure stage 5.  Patient appears to be experiencing anticholinergic toxicity from the ellipta.  This is causing a postrenal type obstruction and decline in renal function.  Patient was also taking metformin for his DM which is likely contributing to the renal failure.  Patient is followed by nephrology Dr Peters who is treating the patient with sodium bicarb drip and venofer for anemia.  Appears improved clinically.

## 2017-06-16 NOTE — PROGRESS NOTE ADULT - PROBLEM SELECTOR PLAN 9
Patient does not appear to be in COPD exacerbation.  Will hold duonebs in light of possible anticholinergic toxicity.  Albuterol Q2 PRN  Sputum culture due to chronic cough.  Oxygen PRN to maintain O2 saturation above 92%

## 2017-06-16 NOTE — PROGRESS NOTE ADULT - PROBLEM SELECTOR PLAN 3
Improving  High Anion Gap related to CKD now closed  Sodium Bicarb Infusion    On Sodium bicarb infusion Improving  High Anion Gap related to CKD now closed  Sodium Bicarb Infusion

## 2017-06-16 NOTE — PROGRESS NOTE ADULT - PROBLEM SELECTOR PLAN 7
Patient previously on valsartan/HCTZ 80-12.5  Will hold ARB/HCTZ combo in light of JANI  Will start on PO hydralazine 10mg TID with holding parameters  Hydralazine 10mg IV PRN Q6 for SBP > 160, DBP>110.   BP stable 135/74

## 2017-06-16 NOTE — CHART NOTE - NSCHARTNOTEFT_GEN_A_CORE
Called Radiology and asked to speak to Dr. Gibbons. He was not available because he was in a case.   Spoke with Kayla who will update MD that patient currently inpatient.   Left my number for call back.

## 2017-06-16 NOTE — PROGRESS NOTE ADULT - PROBLEM SELECTOR PLAN 4
Possibly secondary to pulmonary disease.  Patient has a history of lung mass.  Sodium 133 yesterday, improving.  on sodium bicarb.  f/u CMP this morning

## 2017-06-17 DIAGNOSIS — E78.5 HYPERLIPIDEMIA, UNSPECIFIED: ICD-10-CM

## 2017-06-17 DIAGNOSIS — Z29.9 ENCOUNTER FOR PROPHYLACTIC MEASURES, UNSPECIFIED: ICD-10-CM

## 2017-06-17 LAB
ANION GAP SERPL CALC-SCNC: 15 MMOL/L — SIGNIFICANT CHANGE UP (ref 5–17)
ANISOCYTOSIS BLD QL: SLIGHT — SIGNIFICANT CHANGE UP
BASOPHILS # BLD AUTO: 0 K/UL — SIGNIFICANT CHANGE UP (ref 0–0.2)
BASOPHILS NFR BLD AUTO: 0.4 % — SIGNIFICANT CHANGE UP (ref 0–2)
BUN SERPL-MCNC: 47 MG/DL — HIGH (ref 8–20)
CALCIUM SERPL-MCNC: 8.2 MG/DL — LOW (ref 8.6–10.2)
CHLORIDE SERPL-SCNC: 99 MMOL/L — SIGNIFICANT CHANGE UP (ref 98–107)
CO2 SERPL-SCNC: 26 MMOL/L — SIGNIFICANT CHANGE UP (ref 22–29)
CREAT SERPL-MCNC: 2.21 MG/DL — HIGH (ref 0.5–1.3)
EOSINOPHIL # BLD AUTO: 0.1 K/UL — SIGNIFICANT CHANGE UP (ref 0–0.5)
EOSINOPHIL NFR BLD AUTO: 2.1 % — SIGNIFICANT CHANGE UP (ref 0–6)
GLUCOSE SERPL-MCNC: 190 MG/DL — HIGH (ref 70–115)
HCT VFR BLD CALC: 32.2 % — LOW (ref 42–52)
HGB BLD-MCNC: 10.9 G/DL — LOW (ref 14–18)
HYPOCHROMIA BLD QL: SLIGHT — SIGNIFICANT CHANGE UP
LYMPHOCYTES # BLD AUTO: 1 K/UL — SIGNIFICANT CHANGE UP (ref 1–4.8)
LYMPHOCYTES # BLD AUTO: 20.9 % — SIGNIFICANT CHANGE UP (ref 20–55)
MACROCYTES BLD QL: SLIGHT — SIGNIFICANT CHANGE UP
MCHC RBC-ENTMCNC: 30.4 PG — SIGNIFICANT CHANGE UP (ref 27–31)
MCHC RBC-ENTMCNC: 33.9 G/DL — SIGNIFICANT CHANGE UP (ref 32–36)
MCV RBC AUTO: 89.7 FL — SIGNIFICANT CHANGE UP (ref 80–94)
MICROCYTES BLD QL: SLIGHT — SIGNIFICANT CHANGE UP
MONOCYTES # BLD AUTO: 0.4 K/UL — SIGNIFICANT CHANGE UP (ref 0–0.8)
MONOCYTES NFR BLD AUTO: 8.1 % — SIGNIFICANT CHANGE UP (ref 3–10)
NEUTROPHILS # BLD AUTO: 3.2 K/UL — SIGNIFICANT CHANGE UP (ref 1.8–8)
NEUTROPHILS NFR BLD AUTO: 68.1 % — SIGNIFICANT CHANGE UP (ref 37–73)
PLAT MORPH BLD: NORMAL — SIGNIFICANT CHANGE UP
PLATELET # BLD AUTO: 196 K/UL — SIGNIFICANT CHANGE UP (ref 150–400)
POTASSIUM SERPL-MCNC: 4 MMOL/L — SIGNIFICANT CHANGE UP (ref 3.5–5.3)
POTASSIUM SERPL-SCNC: 4 MMOL/L — SIGNIFICANT CHANGE UP (ref 3.5–5.3)
RBC # BLD: 3.59 M/UL — LOW (ref 4.6–6.2)
RBC # FLD: 13.6 % — SIGNIFICANT CHANGE UP (ref 11–15.6)
RBC BLD AUTO: ABNORMAL
SODIUM SERPL-SCNC: 140 MMOL/L — SIGNIFICANT CHANGE UP (ref 135–145)
WBC # BLD: 4.7 K/UL — LOW (ref 4.8–10.8)
WBC # FLD AUTO: 4.7 K/UL — LOW (ref 4.8–10.8)

## 2017-06-17 PROCEDURE — 99233 SBSQ HOSP IP/OBS HIGH 50: CPT | Mod: GC

## 2017-06-17 PROCEDURE — 99233 SBSQ HOSP IP/OBS HIGH 50: CPT

## 2017-06-17 RX ORDER — INSULIN LISPRO 100/ML
11 VIAL (ML) SUBCUTANEOUS
Qty: 0 | Refills: 0 | Status: DISCONTINUED | OUTPATIENT
Start: 2017-06-17 | End: 2017-06-18

## 2017-06-17 RX ORDER — SODIUM BICARBONATE 1 MEQ/ML
650 SYRINGE (ML) INTRAVENOUS THREE TIMES A DAY
Qty: 0 | Refills: 0 | Status: DISCONTINUED | OUTPATIENT
Start: 2017-06-17 | End: 2017-06-19

## 2017-06-17 RX ORDER — INSULIN GLARGINE 100 [IU]/ML
20 INJECTION, SOLUTION SUBCUTANEOUS ONCE
Qty: 0 | Refills: 0 | Status: COMPLETED | OUTPATIENT
Start: 2017-06-17 | End: 2017-06-17

## 2017-06-17 RX ORDER — CEFTRIAXONE 500 MG/1
1 INJECTION, POWDER, FOR SOLUTION INTRAMUSCULAR; INTRAVENOUS EVERY 24 HOURS
Qty: 0 | Refills: 0 | Status: DISCONTINUED | OUTPATIENT
Start: 2017-06-17 | End: 2017-06-19

## 2017-06-17 RX ORDER — INSULIN GLARGINE 100 [IU]/ML
35 INJECTION, SOLUTION SUBCUTANEOUS AT BEDTIME
Qty: 0 | Refills: 0 | Status: DISCONTINUED | OUTPATIENT
Start: 2017-06-17 | End: 2017-06-17

## 2017-06-17 RX ADMIN — Medication 11 UNIT(S): at 08:25

## 2017-06-17 RX ADMIN — Medication 75 MICROGRAM(S): at 05:52

## 2017-06-17 RX ADMIN — Medication 11 UNIT(S): at 17:30

## 2017-06-17 RX ADMIN — Medication 11 UNIT(S): at 11:54

## 2017-06-17 RX ADMIN — SIMVASTATIN 40 MILLIGRAM(S): 20 TABLET, FILM COATED ORAL at 21:51

## 2017-06-17 RX ADMIN — IRON SUCROSE 210 MILLIGRAM(S): 20 INJECTION, SOLUTION INTRAVENOUS at 11:54

## 2017-06-17 RX ADMIN — CEFTRIAXONE 100 GRAM(S): 500 INJECTION, POWDER, FOR SOLUTION INTRAMUSCULAR; INTRAVENOUS at 11:54

## 2017-06-17 RX ADMIN — TAMSULOSIN HYDROCHLORIDE 0.4 MILLIGRAM(S): 0.4 CAPSULE ORAL at 21:50

## 2017-06-17 RX ADMIN — INSULIN GLARGINE 20 UNIT(S): 100 INJECTION, SOLUTION SUBCUTANEOUS at 23:23

## 2017-06-17 RX ADMIN — Medication 80 MEQ/KG/HR: at 04:35

## 2017-06-17 RX ADMIN — SENNA PLUS 2 TABLET(S): 8.6 TABLET ORAL at 21:51

## 2017-06-17 RX ADMIN — Medication 10 MILLIGRAM(S): at 05:52

## 2017-06-17 RX ADMIN — Medication 1: at 11:55

## 2017-06-17 RX ADMIN — Medication 650 MILLIGRAM(S): at 23:17

## 2017-06-17 RX ADMIN — Medication 100 MILLIGRAM(S): at 17:30

## 2017-06-17 RX ADMIN — Medication 1: at 17:29

## 2017-06-17 RX ADMIN — PANTOPRAZOLE SODIUM 40 MILLIGRAM(S): 20 TABLET, DELAYED RELEASE ORAL at 05:53

## 2017-06-17 RX ADMIN — Medication 100 MILLIGRAM(S): at 05:52

## 2017-06-17 RX ADMIN — Medication 100 MILLIGRAM(S): at 21:50

## 2017-06-17 RX ADMIN — Medication 10 MILLIGRAM(S): at 17:30

## 2017-06-17 RX ADMIN — Medication 0.5 MILLIGRAM(S): at 21:51

## 2017-06-17 RX ADMIN — Medication 1: at 08:24

## 2017-06-17 NOTE — PROGRESS NOTE ADULT - ASSESSMENT
A & P : 76 yr old male with PMH of COPD on CPAP (no home oxygen, never intubated), DM on insulin, HTN, lung nodule, vocal cord cancer, GEORGE, hypothyroidism, hemorrhoids, anxiety. Was following up with a Nephrologist in [place] for 15 years before moving here in 2014.   Long standing CKD-3  due to Diabetic Nephropathy.   Now with Acute on Chronic Renal Failure due to Obstructive Uropathy, also presenting with Metabolic acidosis

## 2017-06-17 NOTE — PROGRESS NOTE ADULT - PROBLEM SELECTOR PLAN 5
Patient being followed by CT surgery Dr Collins.  Awaiting biopsy scheduled with Dr Gibbons 6/19. -BP stable   -holding outpatient medications valsartan/HCTZ 80-12.5 secondary to JANI on CKD   -c/w hydralazine 10mg TID with holding parameters  -c/w Hydralazine 10mg IV PRN Q6 for SBP > 160, DBP>110.

## 2017-06-17 NOTE — PROGRESS NOTE ADULT - PROBLEM SELECTOR PLAN 3
Resolved on sodium bicarb HBa1c 7.0   -FS stable   -c/w accuchecks ACHS TID   -Increased Lantus 30units QHS to 35 units  - Increased Humalog 10 units premeal to 11units  -c/w HSS   -c/w hypoglycemia protocol

## 2017-06-17 NOTE — PROGRESS NOTE ADULT - PROBLEM SELECTOR PLAN 1
Improving. Post renal obstruction (Obstructive Uropathy)  Medically optimized.  OP -  follow up upon discharge,  D/w patient and wife

## 2017-06-17 NOTE — PROGRESS NOTE ADULT - ATTENDING COMMENTS
Note addended where needed Note addended where needed. Tried calling patients wife Lidya [phone] with no answer will re-attempt later.

## 2017-06-17 NOTE — PROGRESS NOTE ADULT - SUBJECTIVE AND OBJECTIVE BOX
Renal :    NEPHROLOGY INTERVAL HPI /OVERNIGHT EVENTS: No acute events overnight    MEDICATIONS  (STANDING):  simvastatin 40milliGRAM(s) Oral at bedtime  levothyroxine 75MICROGram(s) Oral daily  insulin lispro (HumaLOG) corrective regimen sliding scale  SubCutaneous Before meals and at bedtime  dextrose 5%. 1000milliLiter(s) IV Continuous <Continuous>  dextrose 50% Injectable 12.5Gram(s) IV Push once  dextrose 50% Injectable 25Gram(s) IV Push once  dextrose 50% Injectable 25Gram(s) IV Push once  pantoprazole    Tablet 40milliGRAM(s) Oral before breakfast  insulin glargine Injectable (LANTUS) 30Unit(s) SubCutaneous at bedtime  insulin lispro Injectable (HumaLOG) 10Unit(s) SubCutaneous three times a day before meals  hydrALAZINE 10milliGRAM(s) Oral three times a day  tamsulosin 0.4milliGRAM(s) Oral at bedtime  sodium bicarbonate  Infusion 0.119mEq/kG/Hr IV Continuous <Continuous>  iron sucrose IVPB 100milliGRAM(s) IV Intermittent daily  cefTRIAXone   IVPB  IV Intermittent   senna 2Tablet(s) Oral at bedtime  docusate sodium 100milliGRAM(s) Oral three times a day    MEDICATIONS  (PRN):  ALBUTerol    0.083% 2.5milliGRAM(s) Nebulizer every 2 hours PRN Shortness of Breath and/or Wheezing  dextrose Gel 1Dose(s) Oral once PRN Blood Glucose LESS THAN 70 milliGRAM(s)/deciliter  glucagon  Injectable 1milliGRAM(s) IntraMuscular once PRN Glucose LESS THAN 70 milligrams/deciliter  hydrALAZINE Injectable 10milliGRAM(s) IV Push every 6 hours PRN SBP>160 or DBP>110  ALPRAZolam 0.5milliGRAM(s) Oral every 8 hours PRN severe anxiety  polyethylene glycol 3350 17Gram(s) Oral daily PRN Constipation      Allergies    No Known Allergies    REVIEW OF SYSTEMS:    CONSTITUTIONAL: No fever, weight loss, or fatigue  EYES: No eye pain, visual disturbances, or discharge  ENMT:  No difficulty hearing, tinnitus, vertigo; No sinus or throat pain  NECK: No pain or stiffness  RESPIRATORY: No cough, wheezing, chills or hemoptysis; No shortness of breath  CARDIOVASCULAR: No chest pain, palpitations, dizziness, or leg swelling  GASTROINTESTINAL: No abdominal or epigastric pain. No nausea, vomiting, or hematemesis; No diarrhea or constipation. No melena or hematochezia.  GENITOURINARY: + Rodney,  NEUROLOGICAL: No headaches, memory loss, loss of strength, numbness, or tremors  SKIN: No itching, burning, rashes, or lesions     ICU Vital Signs Last 24 Hrs  T(C): 36.9, Max: 36.9 ( @ 15:27)  T(F): 98.4, Max: 98.4 ( @ 15:27)  HR: 78 (73 - 81)  BP: 127/63 (126/63 - 138/67)  BP(mean): --  ABP: --  ABP(mean): --  RR: 20 (18 - 20)  SpO2: 98% (95% - 98%)    PHYSICAL EXAM:    GENERAL: NAD, well-groomed, well-developed  HEAD:  Atraumatic, Normocephalic  EYES: EOMI, PERRLA, conjunctiva and sclera clear  NECK: Supple, No JVD, Normal thyroid  NERVOUS SYSTEM:  Alert & Oriented X3, Good concentration; Motor Strength 5/5 B/L upper and lower extremities  CHEST/LUNG: Clear to percussion bilaterally; No rales, rhonchi, wheezing, or rubs  HEART: Regular rate and rhythm; No murmurs, rubs, or gallops  ABDOMEN: Soft, Nontender, Nondistended; Bowel sounds present  EXTREMITIES:  2+ Peripheral Pulses, No clubbing, cyanosis, or edema  LYMPH: No lymphadenopathy noted  SKIN: No rashes or lesions    LABS:                        11.5   5.6   )-----------( 159      ( 15 Alexander 2017 06:35 )             33.4     06-16    139  |  104  |  62.0<H>  ----------------------------<  169<H>  4.2   |  20.0<L>  |  3.11<H>    Ca    8.2<L>      2017 07:50  Phos  3.7     06-15  Mg     2.3     06-15    TPro  6.3<L>  /  Alb  3.0<L>  /  TBili  0.8  /  DBili  x   /  AST  18  /  ALT  21  /  AlkPhos  54  06-16    PT/INR - ( 2017 11:35 )   PT: 13.3 sec;   INR: 1.20 ratio         PTT - ( 2017 11:35 )  PTT:27.9 sec  Urinalysis Basic - ( 2017 09:03 )    Color: Yellow / Appearance: Clear / S.010 / pH: x  Gluc: x / Ketone: Negative  / Bili: Negative / Urobili: Negative mg/dL   Blood: x / Protein: 15 mg/dL / Nitrite: Negative   Leuk Esterase: Trace / RBC: 11-25 /HPF / WBC 10-15 /HPF   Sq Epi: x / Non Sq Epi: Moderate / Bacteria: x          RADIOLOGY & ADDITIONAL TESTS:

## 2017-06-17 NOTE — PROGRESS NOTE ADULT - PROBLEM SELECTOR PLAN 8
Patient does not appear to be in COPD exacerbation.  Will hold duonebs in light of possible anticholinergic toxicity.  Albuterol Q2 PRN  Sputum culture due to chronic cough.  Oxygen PRN to maintain O2 saturation above 92% Stable since 1996 - in remission  -pt to f/u with outpt heme/Onc

## 2017-06-17 NOTE — PROGRESS NOTE ADULT - PROBLEM SELECTOR PLAN 6
Patient previously on valsartan/HCTZ 80-12.5  Will hold ARB/HCTZ combo in light of JANI  Will start on PO hydralazine 10mg TID with holding parameters  Hydralazine 10mg IV PRN Q6 for SBP > 160, DBP>110.   BP stable -c/w synthroid 75 mcg po qd

## 2017-06-17 NOTE — PROGRESS NOTE ADULT - SUBJECTIVE AND OBJECTIVE BOX
cc: 76 yr old male sent to the ER for elevated BUN/Cr at presurgical testing.     Patient seen and examined at bedside, No acute overnight events.  Patient is comfortable and slept well. Denies any chest pain, SOB, NVD, dysuria, headache, dizziness, palpitations, flank pain, fever, chills.  Patient thakur drained 700cc overnight.  Patient had a BM last night without difficulty    VS: Vital Signs Last 24 Hrs  T(C): 36.4, Max: 37.2 (06-16 @ 07:05)  T(F): 97.6, Max: 98.9 (06-16 @ 07:05)  HR: 78 (70 - 81)  BP: 130/72 (114/66 - 130/72)  BP(mean): --  RR: 20 (18 - 20)  SpO2: 96% (95% - 96%)  Physical Exam: PHYSICAL EXAM:    General: NAD, resting comfortably in bed  HEENT: NC/AT, PERRLA, EOMI  Neck: thyroid normal, no nodules, no lymphadenopathy, no JVD  Lungs: crackles heard at right lung base, no wheezes,  Cardio: S1S2+, RRR, no murmurs  Abdominal: soft, NT, BS+, distended  : thakur in place  Back: no CVA tenderness, no ulcers visualized in the back  Extremities: no edema, no calf tenderness, no ulcers in the feet  Neuro: AAOx3, CN 2-12 grossly intact.  sensation intact in all extremities, 5/5 strength    Labs:                        11.5   5.6   )-----------( 159      ( 15 Alexander 2017 06:35 )             33.4   06-16    139  |  104  |  62.0<H>  ----------------------------<  169<H>  4.2   |  20.0<L>  |  3.11<H>    Ca    8.2<L>      16 Jun 2017 07:50  Phos  3.7     06-15  Mg     2.3     06-15    TPro  6.3<L>  /  Alb  3.0<L>  /  TBili  0.8  /  DBili  x   /  AST  18  /  ALT  21  /  AlkPhos  54  06-16      Medications:  MEDICATIONS  (STANDING):  simvastatin 40milliGRAM(s) Oral at bedtime  levothyroxine 75MICROGram(s) Oral daily  insulin lispro (HumaLOG) corrective regimen sliding scale  SubCutaneous Before meals and at bedtime  dextrose 5%. 1000milliLiter(s) IV Continuous <Continuous>  dextrose 50% Injectable 12.5Gram(s) IV Push once  dextrose 50% Injectable 25Gram(s) IV Push once  dextrose 50% Injectable 25Gram(s) IV Push once  pantoprazole    Tablet 40milliGRAM(s) Oral before breakfast  insulin glargine Injectable (LANTUS) 30Unit(s) SubCutaneous at bedtime  insulin lispro Injectable (HumaLOG) 10Unit(s) SubCutaneous three times a day before meals  hydrALAZINE 10milliGRAM(s) Oral three times a day  tamsulosin 0.4milliGRAM(s) Oral at bedtime  sodium bicarbonate  Infusion 0.119mEq/kG/Hr IV Continuous <Continuous>  iron sucrose IVPB 100milliGRAM(s) IV Intermittent daily  senna 2Tablet(s) Oral at bedtime  docusate sodium 100milliGRAM(s) Oral three times a day    MEDICATIONS  (PRN):  ALBUTerol    0.083% 2.5milliGRAM(s) Nebulizer every 2 hours PRN Shortness of Breath and/or Wheezing  dextrose Gel 1Dose(s) Oral once PRN Blood Glucose LESS THAN 70 milliGRAM(s)/deciliter  glucagon  Injectable 1milliGRAM(s) IntraMuscular once PRN Glucose LESS THAN 70 milligrams/deciliter  hydrALAZINE Injectable 10milliGRAM(s) IV Push every 6 hours PRN SBP>160 or DBP>110  ALPRAZolam 0.5milliGRAM(s) Oral every 8 hours PRN severe anxiety  polyethylene glycol 3350 17Gram(s) Oral daily PRN Constipation cc: 76 yr old male sent to the ER for elevated BUN/Cr at presurgical testing.     Patient seen and examined at bedside, No acute overnight events.  Patient is comfortable and slept well. Denies any chest pain, SOB, NVD, dysuria, headache, dizziness, palpitations, flank pain, fever, chills.  Patient thakur drained 700cc overnight.  Patient had a BM last night without difficulty    VS: Vital Signs Last 24 Hrs  T(C): 36.4, Max: 37.2 (06-16 @ 07:05)  T(F): 97.6, Max: 98.9 (06-16 @ 07:05)  HR: 78 (70 - 81)  BP: 130/72 (114/66 - 130/72)  BP(mean): --  RR: 20 (18 - 20)  SpO2: 96% (95% - 96%)  Physical Exam: PHYSICAL EXAM:    General: NAD, resting comfortably in bed  HEENT: NC/AT, PERRLA, EOMI  Neck: thyroid normal, no nodules, no lymphadenopathy, no JVD  Lungs: crackles heard at right lung base, no wheezes,  Cardio: S1S2+, RRR, no murmurs  Abdominal: soft, NT, BS+, distended  : thakur in place draining well   Back: no CVA tenderness, no ulcers visualized in the back  Extremities: no edema, no calf tenderness, no ulcers in the feet  Neuro: AAOx3, CN 2-12 grossly intact.  sensation intact in all extremities, 5/5 strength    Labs:                        11.5   5.6   )-----------( 159      ( 15 Alexander 2017 06:35 )             33.4   06-16    139  |  104  |  62.0<H>  ----------------------------<  169<H>  4.2   |  20.0<L>  |  3.11<H>    Ca    8.2<L>      16 Jun 2017 07:50  Phos  3.7     06-15  Mg     2.3     06-15    TPro  6.3<L>  /  Alb  3.0<L>  /  TBili  0.8  /  DBili  x   /  AST  18  /  ALT  21  /  AlkPhos  54  06-16      Medications:  MEDICATIONS  (STANDING):  simvastatin 40milliGRAM(s) Oral at bedtime  levothyroxine 75MICROGram(s) Oral daily  insulin lispro (HumaLOG) corrective regimen sliding scale  SubCutaneous Before meals and at bedtime  dextrose 5%. 1000milliLiter(s) IV Continuous <Continuous>  dextrose 50% Injectable 12.5Gram(s) IV Push once  dextrose 50% Injectable 25Gram(s) IV Push once  dextrose 50% Injectable 25Gram(s) IV Push once  pantoprazole    Tablet 40milliGRAM(s) Oral before breakfast  insulin glargine Injectable (LANTUS) 30Unit(s) SubCutaneous at bedtime  insulin lispro Injectable (HumaLOG) 10Unit(s) SubCutaneous three times a day before meals  hydrALAZINE 10milliGRAM(s) Oral three times a day  tamsulosin 0.4milliGRAM(s) Oral at bedtime  sodium bicarbonate  Infusion 0.119mEq/kG/Hr IV Continuous <Continuous>  iron sucrose IVPB 100milliGRAM(s) IV Intermittent daily  senna 2Tablet(s) Oral at bedtime  docusate sodium 100milliGRAM(s) Oral three times a day    MEDICATIONS  (PRN):  ALBUTerol    0.083% 2.5milliGRAM(s) Nebulizer every 2 hours PRN Shortness of Breath and/or Wheezing  dextrose Gel 1Dose(s) Oral once PRN Blood Glucose LESS THAN 70 milliGRAM(s)/deciliter  glucagon  Injectable 1milliGRAM(s) IntraMuscular once PRN Glucose LESS THAN 70 milligrams/deciliter  hydrALAZINE Injectable 10milliGRAM(s) IV Push every 6 hours PRN SBP>160 or DBP>110  ALPRAZolam 0.5milliGRAM(s) Oral every 8 hours PRN severe anxiety  polyethylene glycol 3350 17Gram(s) Oral daily PRN Constipation

## 2017-06-17 NOTE — PROGRESS NOTE ADULT - PROBLEM SELECTOR PLAN 2
Needed 9 units additional insulin SS yesterday.  FS ranged from 261-175. Will increase insulin regimen.  Lantus 30units QHS->34 units  Humalog 10 units premeal->11units  HBa1c 7.0 Needed 9 units additional insulin SS yesterday.  FS ranged from 261-175. Will increase insulin regimen.  Lantus 30units QHS->35 units  Humalog 10 units premeal->11units  HBa1c 7.0 Pt has hx of vocal cord cancer was planned to have outpt RUL bx which is now planned for monday as an inpatient while the patient is here being monitored for his renal function   -Patient being followed by CT surgery Dr Collins.  -Awaiting biopsy scheduled with Dr Gibbons 6/19.

## 2017-06-17 NOTE — PROGRESS NOTE ADULT - PROBLEM SELECTOR PLAN 7
TSH/T4 normal  continue synthroid 75micrograms -stable   -holding duoneb given anti cholinergic sensitivity/ urinary obstruction   -c/w Albuterol Q2 PRN

## 2017-06-17 NOTE — PROGRESS NOTE ADULT - PROBLEM SELECTOR PLAN 3
Improved,  High Anion Gap related to CKD now closed  Sodium Bicarb Infusion may be D.Zay,  Oral NaHCO3,

## 2017-06-17 NOTE — PROGRESS NOTE ADULT - ASSESSMENT
This is a 76 yr old male with PMH of COPD on CPAP (no home oxygen, never intubated), DM on insulin, HTN, HLD, lung nodule, vocal cord cancer, GEORGE, hypothyroidism, GERD, hemorrhoids, anxiety admitted for acute on chronic renal failure stage 5.  Patient appears to be experiencing anticholinergic toxicity from the ellipta.  This is causing a postrenal type obstruction and decline in renal function.  Patient was also taking metformin for his DM which is likely contributing to the renal failure.  Patient is followed by nephrology Dr Peters who is treating the patient with sodium bicarb drip and venofer for anemia.  Appears improved clinically.  Dr Gibbons was notified that the patient is in the hospital and is scheduled for CT guided biopsy of right lung mass on Monday.

## 2017-06-17 NOTE — PROGRESS NOTE ADULT - PROBLEM SELECTOR PLAN 4
12.8/38.0  Mildly anemic.  no reports of bleeding.  Started on venofer for 3 doses. -h/h remains stable   -pt to complete IV iron therapy today

## 2017-06-18 ENCOUNTER — TRANSCRIPTION ENCOUNTER (OUTPATIENT)
Age: 77
End: 2017-06-18

## 2017-06-18 LAB
ANION GAP SERPL CALC-SCNC: 16 MMOL/L — SIGNIFICANT CHANGE UP (ref 5–17)
ANISOCYTOSIS BLD QL: SLIGHT — SIGNIFICANT CHANGE UP
BASOPHILS # BLD AUTO: 0 K/UL — SIGNIFICANT CHANGE UP (ref 0–0.2)
BASOPHILS NFR BLD AUTO: 0.4 % — SIGNIFICANT CHANGE UP (ref 0–2)
BUN SERPL-MCNC: 38 MG/DL — HIGH (ref 8–20)
CALCIUM SERPL-MCNC: 8 MG/DL — LOW (ref 8.4–10.5)
CALCIUM SERPL-MCNC: 8.4 MG/DL — LOW (ref 8.6–10.2)
CHLORIDE SERPL-SCNC: 99 MMOL/L — SIGNIFICANT CHANGE UP (ref 98–107)
CO2 SERPL-SCNC: 25 MMOL/L — SIGNIFICANT CHANGE UP (ref 22–29)
CREAT SERPL-MCNC: 2.18 MG/DL — HIGH (ref 0.5–1.3)
EOSINOPHIL # BLD AUTO: 0.1 K/UL — SIGNIFICANT CHANGE UP (ref 0–0.5)
EOSINOPHIL NFR BLD AUTO: 1.8 % — SIGNIFICANT CHANGE UP (ref 0–5)
GLUCOSE SERPL-MCNC: 168 MG/DL — HIGH (ref 70–115)
HCT VFR BLD CALC: 35.5 % — LOW (ref 42–52)
HGB BLD-MCNC: 11.8 G/DL — LOW (ref 14–18)
LYMPHOCYTES # BLD AUTO: 1.1 K/UL — SIGNIFICANT CHANGE UP (ref 1–4.8)
LYMPHOCYTES # BLD AUTO: 20.4 % — SIGNIFICANT CHANGE UP (ref 20–55)
MACROCYTES BLD QL: SLIGHT — SIGNIFICANT CHANGE UP
MAGNESIUM SERPL-MCNC: 2.2 MG/DL — SIGNIFICANT CHANGE UP (ref 1.6–2.6)
MCHC RBC-ENTMCNC: 30.3 PG — SIGNIFICANT CHANGE UP (ref 27–31)
MCHC RBC-ENTMCNC: 33.2 G/DL — SIGNIFICANT CHANGE UP (ref 32–36)
MCV RBC AUTO: 91.3 FL — SIGNIFICANT CHANGE UP (ref 80–94)
MONOCYTES # BLD AUTO: 0.4 K/UL — SIGNIFICANT CHANGE UP (ref 0–0.8)
MONOCYTES NFR BLD AUTO: 7.8 % — SIGNIFICANT CHANGE UP (ref 3–10)
NEUTROPHILS # BLD AUTO: 3.8 K/UL — SIGNIFICANT CHANGE UP (ref 1.8–8)
NEUTROPHILS NFR BLD AUTO: 69.4 % — SIGNIFICANT CHANGE UP (ref 37–73)
PHOSPHATE SERPL-MCNC: 3.2 MG/DL — SIGNIFICANT CHANGE UP (ref 2.4–4.7)
PLAT MORPH BLD: NORMAL — SIGNIFICANT CHANGE UP
PLATELET # BLD AUTO: 226 K/UL — SIGNIFICANT CHANGE UP (ref 150–400)
POTASSIUM SERPL-MCNC: 4.8 MMOL/L — SIGNIFICANT CHANGE UP (ref 3.5–5.3)
POTASSIUM SERPL-SCNC: 4.8 MMOL/L — SIGNIFICANT CHANGE UP (ref 3.5–5.3)
PTH-INTACT FLD-MCNC: 141 PG/ML — HIGH (ref 15–65)
RBC # BLD: 3.89 M/UL — LOW (ref 4.6–6.2)
RBC # FLD: 13.8 % — SIGNIFICANT CHANGE UP (ref 11–15.6)
RBC BLD AUTO: ABNORMAL
SODIUM SERPL-SCNC: 140 MMOL/L — SIGNIFICANT CHANGE UP (ref 135–145)
WBC # BLD: 5.5 K/UL — SIGNIFICANT CHANGE UP (ref 4.8–10.8)
WBC # FLD AUTO: 5.5 K/UL — SIGNIFICANT CHANGE UP (ref 4.8–10.8)

## 2017-06-18 PROCEDURE — 99233 SBSQ HOSP IP/OBS HIGH 50: CPT | Mod: GC

## 2017-06-18 RX ORDER — INSULIN GLARGINE 100 [IU]/ML
20 INJECTION, SOLUTION SUBCUTANEOUS AT BEDTIME
Qty: 0 | Refills: 0 | Status: DISCONTINUED | OUTPATIENT
Start: 2017-06-18 | End: 2017-06-18

## 2017-06-18 RX ORDER — INSULIN LISPRO 100/ML
5 VIAL (ML) SUBCUTANEOUS
Qty: 0 | Refills: 0 | Status: DISCONTINUED | OUTPATIENT
Start: 2017-06-18 | End: 2017-06-18

## 2017-06-18 RX ORDER — TAMSULOSIN HYDROCHLORIDE 0.4 MG/1
1 CAPSULE ORAL
Qty: 0 | Refills: 0 | COMMUNITY
Start: 2017-06-18

## 2017-06-18 RX ORDER — SODIUM CHLORIDE 9 MG/ML
1000 INJECTION, SOLUTION INTRAVENOUS
Qty: 0 | Refills: 0 | Status: DISCONTINUED | OUTPATIENT
Start: 2017-06-19 | End: 2017-06-19

## 2017-06-18 RX ORDER — INSULIN GLARGINE 100 [IU]/ML
15 INJECTION, SOLUTION SUBCUTANEOUS AT BEDTIME
Qty: 0 | Refills: 0 | Status: DISCONTINUED | OUTPATIENT
Start: 2017-06-18 | End: 2017-06-18

## 2017-06-18 RX ORDER — ENOXAPARIN SODIUM 100 MG/ML
30 INJECTION SUBCUTANEOUS DAILY
Qty: 0 | Refills: 0 | Status: DISCONTINUED | OUTPATIENT
Start: 2017-06-18 | End: 2017-06-19

## 2017-06-18 RX ORDER — INSULIN LISPRO 100/ML
11 VIAL (ML) SUBCUTANEOUS
Qty: 0 | Refills: 0 | Status: DISCONTINUED | OUTPATIENT
Start: 2017-06-18 | End: 2017-06-19

## 2017-06-18 RX ORDER — INSULIN GLARGINE 100 [IU]/ML
10 INJECTION, SOLUTION SUBCUTANEOUS AT BEDTIME
Qty: 0 | Refills: 0 | Status: DISCONTINUED | OUTPATIENT
Start: 2017-06-18 | End: 2017-06-19

## 2017-06-18 RX ADMIN — Medication 11 UNIT(S): at 08:28

## 2017-06-18 RX ADMIN — Medication 650 MILLIGRAM(S): at 11:59

## 2017-06-18 RX ADMIN — PANTOPRAZOLE SODIUM 40 MILLIGRAM(S): 20 TABLET, DELAYED RELEASE ORAL at 05:19

## 2017-06-18 RX ADMIN — Medication 650 MILLIGRAM(S): at 22:24

## 2017-06-18 RX ADMIN — Medication 100 MILLIGRAM(S): at 22:24

## 2017-06-18 RX ADMIN — CEFTRIAXONE 100 GRAM(S): 500 INJECTION, POWDER, FOR SOLUTION INTRAMUSCULAR; INTRAVENOUS at 11:54

## 2017-06-18 RX ADMIN — Medication 75 MICROGRAM(S): at 05:19

## 2017-06-18 RX ADMIN — Medication 1: at 08:28

## 2017-06-18 RX ADMIN — Medication 11 UNIT(S): at 11:54

## 2017-06-18 RX ADMIN — Medication 2: at 22:24

## 2017-06-18 RX ADMIN — Medication 10 MILLIGRAM(S): at 22:24

## 2017-06-18 RX ADMIN — Medication 100 MILLIGRAM(S): at 11:58

## 2017-06-18 RX ADMIN — Medication 650 MILLIGRAM(S): at 05:18

## 2017-06-18 RX ADMIN — SENNA PLUS 2 TABLET(S): 8.6 TABLET ORAL at 22:25

## 2017-06-18 RX ADMIN — Medication 11 UNIT(S): at 17:12

## 2017-06-18 RX ADMIN — Medication 0.5 MILLIGRAM(S): at 22:26

## 2017-06-18 RX ADMIN — IRON SUCROSE 210 MILLIGRAM(S): 20 INJECTION, SOLUTION INTRAVENOUS at 11:54

## 2017-06-18 RX ADMIN — Medication 1: at 17:11

## 2017-06-18 RX ADMIN — Medication 10 MILLIGRAM(S): at 11:55

## 2017-06-18 RX ADMIN — Medication 2: at 11:55

## 2017-06-18 RX ADMIN — Medication 100 MILLIGRAM(S): at 05:18

## 2017-06-18 RX ADMIN — Medication 10 MILLIGRAM(S): at 05:19

## 2017-06-18 RX ADMIN — TAMSULOSIN HYDROCHLORIDE 0.4 MILLIGRAM(S): 0.4 CAPSULE ORAL at 22:25

## 2017-06-18 RX ADMIN — Medication 0.5 MILLIGRAM(S): at 11:58

## 2017-06-18 RX ADMIN — INSULIN GLARGINE 10 UNIT(S): 100 INJECTION, SOLUTION SUBCUTANEOUS at 22:23

## 2017-06-18 RX ADMIN — SIMVASTATIN 40 MILLIGRAM(S): 20 TABLET, FILM COATED ORAL at 22:25

## 2017-06-18 NOTE — DISCHARGE NOTE ADULT - CARE PROVIDER_API CALL
Mark Peters (PABLO), Internal Medicine; Nephrology  [address] [place] [address]  Phone: [phone]  Fax: [phone]    Aristeo Daly), Critical Care Medicine; Pulmonary Disease; Sleep Medicine  [address] [place] [address]  Phone: [phone]  Fax: [phone]    Alonzo Collins), Surgery; Thoracic Surgery  [address] [place] [address]  Phone: [phone]  Fax: [phone] Mark Peters (PABLO), Internal Medicine; Nephrology  [address] [place] [address]  Phone: [phone]  Fax: [phone]    Aristeo Daly), Critical Care Medicine; Pulmonary Disease; Sleep Medicine  [address] [place] [address]  Phone: [phone]  Fax: [phone]    Alonzo Collins), Surgery; Thoracic Surgery  [address] [place] [address]  Phone: [phone]  Fax: [phone]    Alma Bernardo  [address] [place] [address] Clinic   [phone]  Phone: (   )    -  Fax: (   )    -

## 2017-06-18 NOTE — PROGRESS NOTE ADULT - PROBLEM SELECTOR PLAN 7
-stable   -holding duoneb given anti cholinergic sensitivity/ urinary obstruction   -c/w Albuterol Q2 PRN

## 2017-06-18 NOTE — PROGRESS NOTE ADULT - PROBLEM SELECTOR PLAN 5
-BP stable   -holding outpatient medications valsartan/HCTZ 80-12.5 secondary to JANI on CKD   -c/w hydralazine 10mg TID with holding parameters  -c/w Hydralazine 10mg IV PRN Q6 for SBP > 160, DBP>110.

## 2017-06-18 NOTE — PROGRESS NOTE ADULT - PROBLEM SELECTOR PLAN 2
Pt has hx of vocal cord cancer was planned to have outpt RUL bx which is now planned for monday as an inpatient while the patient is here being monitored for his renal function   -Patient being followed by CT surgery Dr Collins.  -Awaiting biopsy scheduled with Dr Gibbons 6/19. Pt has hx of vocal cord cancer was planned to have outpt RUL bx which is now planned for monday as an inpatient while the patient is here being monitored for his renal function   -Patient being followed by CT surgery Dr Collins.  -Awaiting biopsy scheduled with Dr Gibbons in the am  -NPO at midnight   -T&S and coags ordered for the am   -lantus decreased by 50%

## 2017-06-18 NOTE — PROGRESS NOTE ADULT - PROBLEM SELECTOR PLAN 1
Acute of chronic kidney failure stage 4. Improving creatine and acidosis   -Renal U/S was unremarkable for hydronephrosis, mass, or stones.  -CT Abd/pelvis: No acute findings. Bilateral perinephric fat stranding. Bladder   moderately distended, mildly enlarged prostate.  -Empirically tx for UTI given UA results  will c/w rocephin 1G IVPB QD day 3/5 will d/c abx on 6/20 -Urine culture shows no growth  -Neprho f/u noted and appreciated and d/w Dr. Ward in regards to the plan of care and sodium bicarb IVF discontinued and switched to sodium bicarbonate po tid.  Urinary retention resolled -c/w flomax 0.4mg po qhs Acute of chronic kidney failure.  Improving creatine and acidosis resolved.   -creatine is down trending   -Renal U/S was unremarkable for hydronephrosis, mass, or stones.  -CT Abd/pelvis: No acute findings. Bilateral perinephric fat stranding. Bladder   moderately distended, mildly enlarged prostate.  -Empirically tx for UTI given UA results  will c/w rocephin 1G IVPB QD day 3/5 will d/c abx on 6/20 -Urine culture shows no growth if pt is discharged prior to that oral abx will be given.   -s/p thakur cath removal yest without and pt urinating without any complications  -c/w flomax 0.4mg po qhs

## 2017-06-18 NOTE — DISCHARGE NOTE ADULT - PATIENT PORTAL LINK FT
“You can access the [facility] Patient Portal, offered by [facility], by registering with the following website: [email or web address]”

## 2017-06-18 NOTE — PROGRESS NOTE ADULT - ATTENDING COMMENTS
Note addended where needed. D/w wife Lidya in regards to the plan of care and anticipated discharge planning for the am if the bx is uneventful.

## 2017-06-18 NOTE — DISCHARGE NOTE ADULT - ADDITIONAL INSTRUCTIONS
F/U with Pulmonary within the next week;  Follow up with Nephrology outpatient within 7 days of discharge;  f/u with pcp within 7 days;  Please hold using Metformin because of your worsening kidney function; Please use the insulin at the doses we have prescribed to you and not your previous doses; Follow up with Pulmonary within the next week;  Follow up with Nephrology outpatient within 7 days of discharge;  follow up with primary care physician within 7 days;  Please hold using Metformin because of your worsening kidney function; Please use the insulin at the doses we have prescribed to you and not your previous doses. Holding your valsartan and hydrochlorathiazide secondary to your kidney function. Follow up with Pulmonary within the next week;  Follow up with Nephrology outpatient within 4-6 weeks of discharge;  follow up with primary care physician within 7 days;  Please hold using Metformin because of your worsening kidney function; Please use the insulin at the doses we have prescribed to you and not your previous doses. Holding your valsartan and hydrochlorathiazide secondary to your kidney function.

## 2017-06-18 NOTE — PROGRESS NOTE ADULT - PROBLEM SELECTOR PLAN 3
HBa1c 7.0   -FS stable   -c/w accuchecks ACHS TID   -Increased Lantus 30units QHS to 35 units  - Increased Humalog 10 units premeal to 11units  -c/w HSS   -c/w hypoglycemia protocol HBa1c 7.0   -FS stable   -c/w accuchecks ACHS TID   -Decreased lantus to 10 units sq qhs for anticpated npo status   - c/w Humalog 11units sq achs tid  -c/w HSS   -c/w hypoglycemia protocol

## 2017-06-18 NOTE — DISCHARGE NOTE ADULT - MEDICATION SUMMARY - MEDICATIONS TO TAKE
I will START or STAY ON the medications listed below when I get home from the hospital:    tamsulosin 0.4 mg oral capsule  -- 1 cap(s) by mouth once a day (at bedtime)  -- Indication: For BPH    HumaLOG KwikPen (Concentrated) 200 units/mL subcutaneous solution  -- 10 unit(s) subcutaneous 3 times a day (before meals)  -- flex pen  10u prio to meals  -- Indication: For Diabetes    Lantus 100 units/mL subcutaneous solution  -- 10 unit(s) subcutaneous once a day (at bedtime)  -- 30 u bedtime  -- Indication: For Diabetes    simvastatin 40 mg oral tablet  -- 1 tab(s) by mouth once a day (at bedtime)  -- Indication: For Dyslipidemia    cephalexin 500 mg oral capsule  -- 1 cap(s) by mouth every 12 hours  -- Finish all this medication unless otherwise directed by prescriber.    -- Indication: For urinary tract infection    senna oral tablet  -- 2 tab(s) by mouth once a day (at bedtime)  -- Indication: For COnstipation    docusate sodium 100 mg oral capsule  -- 1 cap(s) by mouth once a day, As Needed  -- Indication: For COnstipation    Synthroid 75 mcg (0.075 mg) oral tablet  -- 1 tab(s) by mouth once a day  -- Indication: For Hypothyroid    ergocalciferol 50,000 intl units oral tablet  -- 1 tab(s) by mouth once a week  -- Indication: For Vitamin D deficiency I will START or STAY ON the medications listed below when I get home from the hospital:    tamsulosin 0.4 mg oral capsule  -- 1 cap(s) by mouth once a day (at bedtime)  -- Indication: For BPH    HumaLOG KwikPen (Concentrated) 200 units/mL subcutaneous solution  -- 10 unit(s) subcutaneous 3 times a day (before meals)  -- flex pen  10u prio to meals  -- Indication: For Diabetes    Lantus 100 units/mL subcutaneous solution  -- 10 unit(s) subcutaneous once a day (at bedtime)  -- 30 u bedtime  -- Indication: For Diabetes    simvastatin 40 mg oral tablet  -- 1 tab(s) by mouth once a day (at bedtime)  -- Indication: For Dyslipidemia    ALPRAZolam 0.5 mg oral tablet  -- 1 tab(s) by mouth every 8 hours, As needed, severe anxiety  -- Indication: For Anxiety    Ambien 5 mg oral tablet  -- 1 tab(s) by mouth once a day (at bedtime)  -- Indication: For insomnia    cephalexin 500 mg oral capsule  -- 1 cap(s) by mouth every 12 hours  -- Finish all this medication unless otherwise directed by prescriber.    -- Indication: For urinary tract infection    senna oral tablet  -- 2 tab(s) by mouth once a day (at bedtime), As Needed  -- Indication: For COnstipation    docusate sodium 100 mg oral capsule  -- 1 cap(s) by mouth once a day, As Needed  -- Indication: For COnstipation    Synthroid 75 mcg (0.075 mg) oral tablet  -- 1 tab(s) by mouth once a day  -- Indication: For Hypothyroid    calcitriol 0.25 mcg oral capsule  -- 1 cap(s) by mouth once a day  -- Indication: For renal insufficiency     ergocalciferol 50,000 intl units oral tablet  -- 1 tab(s) by mouth once a week -for chest pain  -- Indication: For Vitamin d deficiency I will START or STAY ON the medications listed below when I get home from the hospital:    tamsulosin 0.4 mg oral capsule  -- 1 cap(s) by mouth once a day (at bedtime)  -- Indication: For BPH    HumaLOG KwikPen (Concentrated) 200 units/mL subcutaneous solution  -- 10 unit(s) subcutaneous 3 times a day (before meals)  -- flex pen  10u prio to meals  -- Indication: For Diabetes    Lantus 100 units/mL subcutaneous solution  -- 10 unit(s) subcutaneous once a day (at bedtime)  -- 30 u bedtime  -- Indication: For Diabetes    simvastatin 40 mg oral tablet  -- 1 tab(s) by mouth once a day (at bedtime)  -- Indication: For Dyslipidemia    ALPRAZolam 0.5 mg oral tablet  -- 1 tab(s) by mouth every 8 hours, As needed, severe anxiety  -- Indication: For Anxiety    Ambien 5 mg oral tablet  -- 1 tab(s) by mouth once a day (at bedtime)  -- Indication: For insomnia    albuterol 90 mcg/inh inhalation aerosol  -- 2 puff(s) inhaled 4 times a day  -- Indication: For COPD (chronic obstructive pulmonary disease)    cephalexin 500 mg oral capsule  -- 1 cap(s) by mouth every 12 hours  -- Finish all this medication unless otherwise directed by prescriber.    -- Indication: For urinary tract infection    senna oral tablet  -- 2 tab(s) by mouth once a day (at bedtime), As Needed  -- Indication: For COnstipation    docusate sodium 100 mg oral capsule  -- 1 cap(s) by mouth once a day, As Needed  -- Indication: For COnstipation    Synthroid 75 mcg (0.075 mg) oral tablet  -- 1 tab(s) by mouth once a day  -- Indication: For Hypothyroid    calcitriol 0.25 mcg oral capsule  -- 1 cap(s) by mouth once a day  -- Indication: For renal insufficiency     ergocalciferol 50,000 intl units oral tablet  -- 1 tab(s) by mouth once a week -for chest pain  -- Indication: For Vitamin d deficiency I will START or STAY ON the medications listed below when I get home from the hospital:    tamsulosin 0.4 mg oral capsule  -- 1 cap(s) by mouth once a day (at bedtime)  -- Indication: For BPH    HumaLOG KwikPen (Concentrated) 200 units/mL subcutaneous solution  -- 10 unit(s) subcutaneous 3 times a day (before meals)  -- flex pen  10u prio to meals  -- Indication: For Diabetes    Lantus 100 units/mL subcutaneous solution  -- 10 unit(s) subcutaneous once a day (at bedtime)  -- 30 u bedtime  -- Indication: For Diabetes    simvastatin 40 mg oral tablet  -- 1 tab(s) by mouth once a day (at bedtime)  -- Indication: For Dyslipidemia    ALPRAZolam 0.5 mg oral tablet  -- 1 tab(s) by mouth every 8 hours, As needed, severe anxiety  -- Indication: For Anxiety    Ambien 5 mg oral tablet  -- 1 tab(s) by mouth once a day (at bedtime)  -- Indication: For insomnia    Norvasc 5 mg oral tablet  -- 1 tab(s) by mouth once a day  -- It is very important that you take or use this exactly as directed.  Do not skip doses or discontinue unless directed by your doctor.  Some non-prescription drugs may aggravate your condition.  Read all labels carefully.  If a warning appears, check with your doctor before taking.    -- Indication: For Hypertension    cephalexin 500 mg oral capsule  -- 1 cap(s) by mouth every 12 hours  -- Finish all this medication unless otherwise directed by prescriber.    -- Indication: For urinary tract infection    senna oral tablet  -- 2 tab(s) by mouth once a day (at bedtime), As Needed  -- Indication: For COnstipation    docusate sodium 100 mg oral capsule  -- 1 cap(s) by mouth once a day, As Needed  -- Indication: For COnstipation    Synthroid 75 mcg (0.075 mg) oral tablet  -- 1 tab(s) by mouth once a day  -- Indication: For Hypothyroid    calcitriol 0.25 mcg oral capsule  -- 1 cap(s) by mouth once a day  -- Indication: For renal insufficiency     ergocalciferol 50,000 intl units oral tablet  -- 1 tab(s) by mouth once a week -for chest pain  -- Indication: For Vitamin d deficiency

## 2017-06-18 NOTE — DISCHARGE NOTE ADULT - PROVIDER TOKENS
TOKEN:'[id number]:'[id number]:'[id number]' TOKEN:'[id number]:'[id number]:'[id number]',FREE:[LAST:[Az],FIRST:[Alma],PHONE:[(   )    -],FAX:[(   )    -],ADDRESS:[[address] [place] [address] Clinic   [phone]]]

## 2017-06-18 NOTE — DISCHARGE NOTE ADULT - HOSPITAL COURSE
This is a 76 yr old male with PMH of COPD on CPAP (no home oxygen, never intubated), DM on insulin, HTN, HLD, lung nodule, vocal cord cancer, GEORGE, hypothyroidism, GERD, hemorrhoids, anxiety presented to [facility] ER after presurgical testing with abnormal labs. Patient was found to have an elevated BUN/Cr of **** This is a 76 yr old male with PMH of COPD on CPAP (no home oxygen, never intubated), DM on insulin, HTN, HLD, lung nodule, vocal cord cancer, GEORGE, hypothyroidism, GERD, hemorrhoids, anxiety presented to [facility] ER after presurgical testing with abnormal labs. Patient was found to have an elevated BUN/Cr of 64/4.21. Patient diagnosed with acute on chronic kidney injury with metabolic acidosis due to obstructive uropathy. Patient was treated with iv hydration, Rodney catheter with some clinical response. Renal was consulted and placed      Since patient was inpatient, IR Dr. Gibbons was informed and patient's previously planned outpatient IR biopsy was scheduled for inpatient.  Patient tolerated procedure well.  Patient has been instructed to follow up with +++++++++++++  Patient is medically optimized and stable for discharge. This is a 76 yr old male with PMH of COPD on CPAP (no home oxygen, never intubated), DM on insulin, HTN, HLD, lung nodule, vocal cord cancer, GEORGE, hypothyroidism, GERD, hemorrhoids, anxiety presented to [facility] ER after presurgical testing with abnormal labs. Patient was found to have an elevated BUN/Cr of 64/4.21. Patient diagnosed with acute on chronic kidney injury with metabolic acidosis due to obstructive uropathy. Patient was treated with iv hydration, Rodney catheter with some clinical response. Renal was consulted and they treated patient with bicarb infusion and transitioned to oral bicab. Patient was also treated for clinical UTI.  Since patient was inpatient, IR Dr. Gibbons was informed and patient's previously planned outpatient IR biopsy was scheduled for inpatient. Patient tolerated procedure well.    Patient has been instructed to follow up with +++++++++++++  Patient is medically optimized and stable for discharge. This is a 76 yr old male with PMH of COPD on CPAP (no home oxygen, never intubated), DM on insulin, HTN, HLD, lung nodule, vocal cord cancer, GEORGE, hypothyroidism, GERD, hemorrhoids, anxiety presented to [facility] ER after presurgical testing with abnormal labs. Patient was found to have an elevated BUN/Cr of 64/4.21. Patient diagnosed with acute on chronic kidney injury with metabolic acidosis due to obstructive uropathy. Patient was treated with iv hydration, Rodney catheter with some clinical response. Renal was consulted and they treated patient with bicarb infusion and transitioned to oral bicab. Patient was also treated for clinical UTI.  Since patient was inpatient, IR Dr. Gibbons was informed and patient's previously planned outpatient IR Lung biopsy was scheduled for inpatient. Patient tolerated procedure well.    Patient has been instructed to follow up with Nephrologist  on an outapatient basis within 1 week.    Patient is medically optimized and stable for discharge. This is a 76 yr old male with PMH of COPD on CPAP (no home oxygen, never intubated), DM on insulin, HTN, HLD, lung nodule, vocal cord cancer, GEORGE, hypothyroidism, GERD, hemorrhoids, anxiety presented to [facility] ER after presurgical testing with abnormal labs. Patient was found to have an elevated BUN/Cr of 64/4.21. Patient diagnosed with acute on chronic kidney injury with metabolic acidosis due to obstructive uropathy. Patient was treated with iv hydration, Rodney catheter with some clinical response. Renal was consulted and they treated patient with bicarb infusion and transitioned to oral bicab. Patient was also treated for clinical UTI.  Since patient was inpatient, IR Dr. Gibbons was informed and patient's previously planned outpatient IR Lung biopsy was scheduled for inpatient. Patient tolerated procedure well.      Patient has been instructed to follow up with Nephrologist  on an outapatient basis within 1 week and check creatine at that time. Patient ws advised to f/u with CT sx as an outpt for bx results.   Patient is medically optimized and stable for discharge.    Discharge planning took 45 minutes

## 2017-06-18 NOTE — DISCHARGE NOTE ADULT - OTHER SIGNIFICANT FINDINGS
CT Abd & Pelvis    No acute findings. Bilateral perinephric fat stranding. Bladder   moderately distended, mildly enlarged prostate, correlate with urine   output and PSA levels.

## 2017-06-18 NOTE — DISCHARGE NOTE ADULT - MEDICATION SUMMARY - MEDICATIONS TO STOP TAKING
I will STOP taking the medications listed below when I get home from the hospital:    metFORMIN 1000 mg oral tablet  -- 1 tab(s) by mouth 2 times a day I will STOP taking the medications listed below when I get home from the hospital:    valsartan-hydrochlorothiazide 80mg-12.5mg oral tablet  -- 1 tab(s) by mouth once a day    metFORMIN 1000 mg oral tablet  -- 1 tab(s) by mouth 2 times a day    Gaviscon  --  by mouth , As Needed

## 2017-06-18 NOTE — DISCHARGE NOTE ADULT - PLAN OF CARE
resolving Initial BUN/Cr   Discharge BUN/Cr Follow up with your primary care  doctor within 3-5 days of discharge Follow up with your primary care doctor  take flomax as prescribed  Follow up with your PMD Follow up with pulmonology for further management take synthroid as prescribed treated work up Follow up with Nephrology outpatient within 7 days of discharge resolving; maintenance; Follow up with Nephrology outpatient within 7 days of discharge;  f/u with pcp within 7 days; continue home meds stabilize chronic renal disease follow up with Dr Collins ( CT surgery), Pulmonology and PMD  Follow up with lung biopsy reports Continue insulin ( humalog and lantus) as indicated in the medications section  STOP metformin. Follow up with PMD within 2-3 days Continue norvasc, hold valsartan/hydrochlorothiazide secondary to your kidney function. Follow up with your primary care doctor and nephrologist   continue to take flomax as prescribed  Follow up with your PMD continue home medications. follow up with Dr Collins ( CT surgery), Pulmonology and primary care physician  Follow up with lung biopsy reports. Continue insulin ( humalog and lantus) as indicated in the medications section  STOP metformin. Follow up with primary care physician within 2-3 days Follow up with Nephrology outpatient within 4-6 weeks of discharge;  f/u with primary care physician this week and check creatine at that time. follow up with Dr Collins ( CT surgery), Pulmonology and primary care physician  Follow up with lung biopsy reports. Please have repeat cxr this week with your primary care physician.

## 2017-06-18 NOTE — DISCHARGE NOTE ADULT - CARE PLAN
Principal Discharge DX:	Acute on chronic renal failure  Secondary Diagnosis:	Obstructive uropathy  Secondary Diagnosis:	Diabetic nephropathy  Secondary Diagnosis:	Lung nodule Principal Discharge DX:	Acute on chronic renal failure  Goal:	resolving  Instructions for follow-up, activity and diet:	Initial BUN/Cr   Discharge BUN/Cr  Secondary Diagnosis:	Obstructive uropathy  Secondary Diagnosis:	Diabetic nephropathy  Secondary Diagnosis:	Lung nodule Principal Discharge DX:	Acute on chronic renal failure  Goal:	resolving  Instructions for follow-up, activity and diet:	Follow up with Nephrology outpatient within 7 days of discharge  Secondary Diagnosis:	Obstructive uropathy  Instructions for follow-up, activity and diet:	Follow up with your primary care doctor  take flomax as prescribed  Follow up with your PMD  Secondary Diagnosis:	Diabetic nephropathy  Instructions for follow-up, activity and diet:	Follow up with your primary care  doctor within 3-5 days of discharge  Secondary Diagnosis:	Lung nodule  Goal:	work up  Instructions for follow-up, activity and diet:	Follow up with pulmonology for further management  Secondary Diagnosis:	Hypothyroidism  Goal:	treated  Instructions for follow-up, activity and diet:	take synthroid as prescribed Principal Discharge DX:	Acute on chronic renal failure  Goal:	resolving  Instructions for follow-up, activity and diet:	Follow up with Nephrology outpatient within 7 days of discharge;  f/u with pcp within 7 days;  Secondary Diagnosis:	Obstructive uropathy  Goal:	resolving;  Instructions for follow-up, activity and diet:	Follow up with your primary care doctor  take flomax as prescribed  Follow up with your PMD  Secondary Diagnosis:	Diabetic nephropathy  Goal:	maintenance;  Instructions for follow-up, activity and diet:	Follow up with your primary care  doctor within 3-5 days of discharge  Secondary Diagnosis:	Lung nodule  Goal:	work up  Instructions for follow-up, activity and diet:	Follow up with pulmonology for further management  Secondary Diagnosis:	Hypothyroidism  Goal:	treated  Instructions for follow-up, activity and diet:	take synthroid as prescribed Principal Discharge DX:	Acute on chronic renal failure  Goal:	stabilize chronic renal disease  Instructions for follow-up, activity and diet:	Follow up with Nephrology outpatient within 7 days of discharge;  f/u with pcp within 7 days;  Secondary Diagnosis:	Obstructive uropathy  Goal:	resolving;  Instructions for follow-up, activity and diet:	Follow up with your primary care doctor  take flomax as prescribed  Follow up with your PMD  Secondary Diagnosis:	Lung nodule  Instructions for follow-up, activity and diet:	follow up with Dr Collins ( CT surgery), Pulmonology and PMD  Follow up with lung biopsy reports  Secondary Diagnosis:	Type 2 diabetes mellitus with other specified complication  Instructions for follow-up, activity and diet:	Continue insulin ( humalog and lantus) as indicated in the medications section  STOP metformin. Follow up with PMD within 2-3 days  Secondary Diagnosis:	Hyperlipidemia, unspecified hyperlipidemia type  Instructions for follow-up, activity and diet:	continue home meds  Secondary Diagnosis:	Hypothyroidism, unspecified type  Instructions for follow-up, activity and diet:	continue home meds Principal Discharge DX:	Acute on chronic renal failure  Goal:	stabilize chronic renal disease  Instructions for follow-up, activity and diet:	Follow up with Nephrology outpatient within 7 days of discharge;  f/u with pcp within 7 days;  Secondary Diagnosis:	Obstructive uropathy  Goal:	resolving;  Instructions for follow-up, activity and diet:	Follow up with your primary care doctor and nephrologist   continue to take flomax as prescribed  Follow up with your PMD  Secondary Diagnosis:	Lung nodule  Instructions for follow-up, activity and diet:	follow up with Dr Collins ( CT surgery), Pulmonology and primary care physician  Follow up with lung biopsy reports.  Secondary Diagnosis:	Type 2 diabetes mellitus with other specified complication  Instructions for follow-up, activity and diet:	Continue insulin ( humalog and lantus) as indicated in the medications section  STOP metformin. Follow up with primary care physician within 2-3 days  Secondary Diagnosis:	Hyperlipidemia, unspecified hyperlipidemia type  Instructions for follow-up, activity and diet:	continue home medications.  Secondary Diagnosis:	Hypothyroidism, unspecified type  Instructions for follow-up, activity and diet:	continue home medications.  Secondary Diagnosis:	Essential hypertension  Instructions for follow-up, activity and diet:	Continue norvasc, hold valsartan/hydrochlorothiazide secondary to your kidney function. Principal Discharge DX:	Acute on chronic renal failure  Goal:	stabilize chronic renal disease  Instructions for follow-up, activity and diet:	Follow up with Nephrology outpatient within 4-6 weeks of discharge;  f/u with primary care physician this week and check creatine at that time.  Secondary Diagnosis:	Obstructive uropathy  Goal:	resolving;  Instructions for follow-up, activity and diet:	Follow up with your primary care doctor and nephrologist   continue to take flomax as prescribed  Follow up with your PMD  Secondary Diagnosis:	Lung nodule  Instructions for follow-up, activity and diet:	follow up with Dr Collins ( CT surgery), Pulmonology and primary care physician  Follow up with lung biopsy reports. Please have repeat cxr this week with your primary care physician.  Secondary Diagnosis:	Type 2 diabetes mellitus with other specified complication  Instructions for follow-up, activity and diet:	Continue insulin ( humalog and lantus) as indicated in the medications section  STOP metformin. Follow up with primary care physician within 2-3 days  Secondary Diagnosis:	Hyperlipidemia, unspecified hyperlipidemia type  Instructions for follow-up, activity and diet:	continue home medications.  Secondary Diagnosis:	Hypothyroidism, unspecified type  Instructions for follow-up, activity and diet:	continue home medications.  Secondary Diagnosis:	Essential hypertension  Instructions for follow-up, activity and diet:	Continue norvasc, hold valsartan/hydrochlorothiazide secondary to your kidney function.

## 2017-06-18 NOTE — PROGRESS NOTE ADULT - ASSESSMENT
This is a 76 yr old male with PMH of COPD on CPAP (no home oxygen, never intubated), DM on insulin, HTN, HLD, lung nodule, vocal cord cancer, GEORGE, hypothyroidism, GERD, hemorrhoids, anxiety admitted for acute on chronic renal failure stage 5.  Patient appears to be experiencing anticholinergic toxicity from the ellipta.  This is causing a postrenal type obstruction and decline in renal function.  Patient was also taking metformin for his DM which is likely contributing to the renal failure.  Patient is followed by nephrology Dr Peters who is treating the patient with sodium bicarb drip and venofer for anemia.  Appears improved clinically.  Dr Gibbons was notified that the patient is in the hospital and is scheduled for CT guided biopsy of right lung mass on Monday. This is a 76 yr old male with PMH of COPD on CPAP (no home oxygen, never intubated), DM on insulin, HTN, HLD, lung nodule, vocal cord cancer, GEORGE, hypothyroidism, GERD, hemorrhoids, anxiety admitted for acute on chronic renal failure. Patient appears to be experiencing anticholinergic toxicity from the ellipta.  This is causing a postrenal type obstruction and decline in renal function.  Patient was also taking metformin for his DM which is likely contributing to the renal failure.  Patient is followed by nephrology Dr Peters who is treating the patient with sodium bicarb drip for acidosis which was switched to po sodium bicarbonate and venofer for anemia which will be discontinued today.  Creatine continued to down trend. Pt also known to have a RUL nodule for which pt was set for outpt IR bx which will now be completed inpatient in the am.

## 2017-06-18 NOTE — DISCHARGE NOTE ADULT - SECONDARY DIAGNOSIS.
Obstructive uropathy Diabetic nephropathy Lung nodule Hypothyroidism Hypothyroidism, unspecified type Type 2 diabetes mellitus with other specified complication Hyperlipidemia, unspecified hyperlipidemia type Essential hypertension

## 2017-06-18 NOTE — DISCHARGE NOTE ADULT - CARE PROVIDERS DIRECT ADDRESSES
[email or web address]

## 2017-06-18 NOTE — PROGRESS NOTE ADULT - SUBJECTIVE AND OBJECTIVE BOX
cc: 76 yr old male sent to the ER for elevated BUN/Cr at presurgical testing.     Patient seen and examined at bedside, No acute overnight events.  Patient is comfortable and slept well. Denies any chest pain, SOB, NVD, dysuria, headache, dizziness, palpitations, flank pain, fever, chills.  Patient had a BM last night and is voiding without difficulty, since urologic stent removal and thakur d/c'ed    Vital Signs Last 24 Hrs  T(C): 37.1, Max: 37.1 ( @ 23:30)  T(F): 98.7, Max: 98.7 ( @ 23:30)  HR: 78 (72 - 78)  BP: 117/59 (105/53 - 138/67)  BP(mean): --  RR: 18 (18 - 20)  SpO2: 93% (93% - 98%)    General: NAD, resting comfortably in bed  HEENT: NC/AT, PERRLA, EOMI  Neck: thyroid normal, no nodules, no lymphadenopathy, no JVD  Lungs: crackles heard at right lung base, no wheezes,  Cardio: S1S2+, RRR, no murmurs  Abdominal: soft, NT, BS+, distended  : thakur in place draining well   Back: no CVA tenderness, no ulcers visualized in the back  Extremities: no edema, no calf tenderness, no ulcers in the feet  Neuro: AAOx3, CN 2-12 grossly intact.  sensation intact in all extremities, 5/5 strength    Daily   I&O's Detail    Last Menstrual Period    LABS:                        10.9   4.7   )-----------( 196      ( 2017 08:53 )             32.2     CBC Full  -  ( 2017 08:53 )  WBC Count : 4.7 K/uL  Hemoglobin : 10.9 g/dL  Hematocrit : 32.2 %  Platelet Count - Automated : 196 K/uL  Mean Cell Volume : 89.7 fl  Mean Cell Hemoglobin : 30.4 pg  Mean Cell Hemoglobin Concentration : 33.9 g/dL  Auto Neutrophil # : 3.2 K/uL  Auto Lymphocyte # : 1.0 K/uL  Auto Monocyte # : 0.4 K/uL  Auto Eosinophil # : 0.1 K/uL  Auto Basophil # : 0.0 K/uL  Auto Neutrophil % : 68.1 %  Auto Lymphocyte % : 20.9 %  Auto Monocyte % : 8.1 %  Auto Eosinophil % : 2.1 %  Auto Basophil % : 0.4 %        140  |  99  |  47.0<H>  ----------------------------<  190<H>  4.0   |  26.0  |  2.21<H>    Ca    8.2<L>      2017 08:53    TPro  6.3<L>  /  Alb  3.0<L>  /  TBili  0.8  /  DBili  x   /  AST  18  /  ALT  21  /  AlkPhos  54  -      Urinalysis Basic - ( 2017 09:03 )    Color: Yellow / Appearance: Clear / S.010 / pH: x  Gluc: x / Ketone: Negative  / Bili: Negative / Urobili: Negative mg/dL   Blood: x / Protein: 15 mg/dL / Nitrite: Negative   Leuk Esterase: Trace / RBC: 11-25 /HPF / WBC 10-15 /HPF   Sq Epi: x / Non Sq Epi: Moderate / Bacteria: x      Hemoglobin A1C, Whole Blood: 7.0 % ( @ 11:36)    Culture Results:   No growth ( @ 18:53)          LIVER FUNCTIONS - ( 2017 07:50 )  Alb: 3.0 g/dL / Pro: 6.3 g/dL / ALK PHOS: 54 U/L / ALT: 21 U/L / AST: 18 U/L / GGT: x cc: 76 yr old male sent to the ER for elevated BUN/Cr at presurgical testing.     Patient seen and examined at bedside, No acute overnight events.  Patient is comfortable and slept well. Denies any chest pain, SOB, NVD, dysuria, headache, dizziness, palpitations, flank pain, fever, chills.  Patient had a BM last night and is voiding without difficulty, since thakur d/c'ed    Vital Signs Last 24 Hrs  T(C): 37.1, Max: 37.1 ( @ 23:30)  T(F): 98.7, Max: 98.7 ( @ 23:30)  HR: 78 (72 - 78)  BP: 117/59 (105/53 - 138/67)  BP(mean): --  RR: 18 (18 - 20)  SpO2: 93% (93% - 98%)    General: NAD, resting comfortably in bed  HEENT: NC/AT, PERRLA, EOMI  Neck: thyroid normal, no nodules, no lymphadenopathy, no JVD  Lungs: crackles heard at right lung base, no wheezes,  Cardio: S1S2+, RRR, no murmurs  Abdominal: soft, NT, BS+, distended  : thakur in place draining well   Back: no CVA tenderness, no ulcers visualized in the back  Extremities: no edema, no calf tenderness, no ulcers in the feet  Neuro: AAOx3, CN 2-12 grossly intact.  sensation intact in all extremities, 5/5 strength    Daily   I&O's Detail    Last Menstrual Period    LABS:                        10.9   4.7   )-----------( 196      ( 2017 08:53 )             32.2     CBC Full  -  ( 2017 08:53 )  WBC Count : 4.7 K/uL  Hemoglobin : 10.9 g/dL  Hematocrit : 32.2 %  Platelet Count - Automated : 196 K/uL  Mean Cell Volume : 89.7 fl  Mean Cell Hemoglobin : 30.4 pg  Mean Cell Hemoglobin Concentration : 33.9 g/dL  Auto Neutrophil # : 3.2 K/uL  Auto Lymphocyte # : 1.0 K/uL  Auto Monocyte # : 0.4 K/uL  Auto Eosinophil # : 0.1 K/uL  Auto Basophil # : 0.0 K/uL  Auto Neutrophil % : 68.1 %  Auto Lymphocyte % : 20.9 %  Auto Monocyte % : 8.1 %  Auto Eosinophil % : 2.1 %  Auto Basophil % : 0.4 %        140  |  99  |  47.0<H>  ----------------------------<  190<H>  4.0   |  26.0  |  2.21<H>    Ca    8.2<L>      2017 08:53    TPro  6.3<L>  /  Alb  3.0<L>  /  TBili  0.8  /  DBili  x   /  AST  18  /  ALT  21  /  AlkPhos  54  -16      Urinalysis Basic - ( 2017 09:03 )    Color: Yellow / Appearance: Clear / S.010 / pH: x  Gluc: x / Ketone: Negative  / Bili: Negative / Urobili: Negative mg/dL   Blood: x / Protein: 15 mg/dL / Nitrite: Negative   Leuk Esterase: Trace / RBC: 11-25 /HPF / WBC 10-15 /HPF   Sq Epi: x / Non Sq Epi: Moderate / Bacteria: x      Hemoglobin A1C, Whole Blood: 7.0 % ( @ 11:36)    Culture Results:   No growth ( @ 18:53)          LIVER FUNCTIONS - ( 2017 07:50 )  Alb: 3.0 g/dL / Pro: 6.3 g/dL / ALK PHOS: 54 U/L / ALT: 21 U/L / AST: 18 U/L / GGT: x

## 2017-06-19 ENCOUNTER — RESULT REVIEW (OUTPATIENT)
Age: 77
End: 2017-06-19

## 2017-06-19 VITALS
HEART RATE: 84 BPM | TEMPERATURE: 98 F | RESPIRATION RATE: 19 BRPM | OXYGEN SATURATION: 94 % | SYSTOLIC BLOOD PRESSURE: 118 MMHG | DIASTOLIC BLOOD PRESSURE: 72 MMHG

## 2017-06-19 LAB
24R-OH-CALCIDIOL SERPL-MCNC: 16.7 NG/ML — LOW (ref 30–100)
ANION GAP SERPL CALC-SCNC: 13 MMOL/L — SIGNIFICANT CHANGE UP (ref 5–17)
BASOPHILS # BLD AUTO: 0 K/UL — SIGNIFICANT CHANGE UP (ref 0–0.2)
BASOPHILS NFR BLD AUTO: 0.2 % — SIGNIFICANT CHANGE UP (ref 0–2)
BLD GP AB SCN SERPL QL: SIGNIFICANT CHANGE UP
BUN SERPL-MCNC: 35 MG/DL — HIGH (ref 8–20)
CALCIUM SERPL-MCNC: 8.3 MG/DL — LOW (ref 8.6–10.2)
CHLORIDE SERPL-SCNC: 102 MMOL/L — SIGNIFICANT CHANGE UP (ref 98–107)
CO2 SERPL-SCNC: 24 MMOL/L — SIGNIFICANT CHANGE UP (ref 22–29)
CREAT SERPL-MCNC: 1.87 MG/DL — HIGH (ref 0.5–1.3)
EOSINOPHIL # BLD AUTO: 0.2 K/UL — SIGNIFICANT CHANGE UP (ref 0–0.5)
EOSINOPHIL NFR BLD AUTO: 3.1 % — SIGNIFICANT CHANGE UP (ref 0–5)
GLUCOSE SERPL-MCNC: 204 MG/DL — HIGH (ref 70–115)
HCT VFR BLD CALC: 32.6 % — LOW (ref 42–52)
HGB BLD-MCNC: 10.8 G/DL — LOW (ref 14–18)
INR BLD: 1.21 RATIO — HIGH (ref 0.88–1.16)
LYMPHOCYTES # BLD AUTO: 1.2 K/UL — SIGNIFICANT CHANGE UP (ref 1–4.8)
LYMPHOCYTES # BLD AUTO: 22.4 % — SIGNIFICANT CHANGE UP (ref 20–55)
MCHC RBC-ENTMCNC: 30.2 PG — SIGNIFICANT CHANGE UP (ref 27–31)
MCHC RBC-ENTMCNC: 33.1 G/DL — SIGNIFICANT CHANGE UP (ref 32–36)
MCV RBC AUTO: 91.1 FL — SIGNIFICANT CHANGE UP (ref 80–94)
MONOCYTES # BLD AUTO: 0.4 K/UL — SIGNIFICANT CHANGE UP (ref 0–0.8)
MONOCYTES NFR BLD AUTO: 7.6 % — SIGNIFICANT CHANGE UP (ref 3–10)
NEUTROPHILS # BLD AUTO: 3.4 K/UL — SIGNIFICANT CHANGE UP (ref 1.8–8)
NEUTROPHILS NFR BLD AUTO: 65.9 % — SIGNIFICANT CHANGE UP (ref 37–73)
PLATELET # BLD AUTO: 232 K/UL — SIGNIFICANT CHANGE UP (ref 150–400)
POTASSIUM SERPL-MCNC: 4.4 MMOL/L — SIGNIFICANT CHANGE UP (ref 3.5–5.3)
POTASSIUM SERPL-SCNC: 4.4 MMOL/L — SIGNIFICANT CHANGE UP (ref 3.5–5.3)
PROTHROM AB SERPL-ACNC: 13.4 SEC — HIGH (ref 9.8–12.7)
RBC # BLD: 3.58 M/UL — LOW (ref 4.6–6.2)
RBC # FLD: 13.7 % — SIGNIFICANT CHANGE UP (ref 11–15.6)
SODIUM SERPL-SCNC: 139 MMOL/L — SIGNIFICANT CHANGE UP (ref 135–145)
TYPE + AB SCN PNL BLD: SIGNIFICANT CHANGE UP
WBC # BLD: 5.1 K/UL — SIGNIFICANT CHANGE UP (ref 4.8–10.8)
WBC # FLD AUTO: 5.1 K/UL — SIGNIFICANT CHANGE UP (ref 4.8–10.8)

## 2017-06-19 PROCEDURE — 81001 URINALYSIS AUTO W/SCOPE: CPT

## 2017-06-19 PROCEDURE — 74176 CT ABD & PELVIS W/O CONTRAST: CPT

## 2017-06-19 PROCEDURE — 77012 CT SCAN FOR NEEDLE BIOPSY: CPT

## 2017-06-19 PROCEDURE — 88333 PATH CONSLTJ SURG CYTO XM 1: CPT | Mod: 26

## 2017-06-19 PROCEDURE — 84443 ASSAY THYROID STIM HORMONE: CPT

## 2017-06-19 PROCEDURE — 71045 X-RAY EXAM CHEST 1 VIEW: CPT

## 2017-06-19 PROCEDURE — 85610 PROTHROMBIN TIME: CPT

## 2017-06-19 PROCEDURE — 83930 ASSAY OF BLOOD OSMOLALITY: CPT

## 2017-06-19 PROCEDURE — 80053 COMPREHEN METABOLIC PANEL: CPT

## 2017-06-19 PROCEDURE — 71010: CPT | Mod: 26

## 2017-06-19 PROCEDURE — 32405: CPT | Mod: RT

## 2017-06-19 PROCEDURE — 77012 CT SCAN FOR NEEDLE BIOPSY: CPT | Mod: 26

## 2017-06-19 PROCEDURE — 83970 ASSAY OF PARATHORMONE: CPT

## 2017-06-19 PROCEDURE — 82310 ASSAY OF CALCIUM: CPT

## 2017-06-19 PROCEDURE — 83550 IRON BINDING TEST: CPT

## 2017-06-19 PROCEDURE — 86900 BLOOD TYPING SEROLOGIC ABO: CPT

## 2017-06-19 PROCEDURE — 84466 ASSAY OF TRANSFERRIN: CPT

## 2017-06-19 PROCEDURE — 80048 BASIC METABOLIC PNL TOTAL CA: CPT

## 2017-06-19 PROCEDURE — 80061 LIPID PANEL: CPT

## 2017-06-19 PROCEDURE — 88333 PATH CONSLTJ SURG CYTO XM 1: CPT

## 2017-06-19 PROCEDURE — 84100 ASSAY OF PHOSPHORUS: CPT

## 2017-06-19 PROCEDURE — 99233 SBSQ HOSP IP/OBS HIGH 50: CPT

## 2017-06-19 PROCEDURE — 87086 URINE CULTURE/COLONY COUNT: CPT

## 2017-06-19 PROCEDURE — 84480 ASSAY TRIIODOTHYRONINE (T3): CPT

## 2017-06-19 PROCEDURE — 86901 BLOOD TYPING SEROLOGIC RH(D): CPT

## 2017-06-19 PROCEDURE — 88305 TISSUE EXAM BY PATHOLOGIST: CPT

## 2017-06-19 PROCEDURE — 84436 ASSAY OF TOTAL THYROXINE: CPT

## 2017-06-19 PROCEDURE — 32553 INS MARK THOR FOR RT PERQ: CPT

## 2017-06-19 PROCEDURE — 83935 ASSAY OF URINE OSMOLALITY: CPT

## 2017-06-19 PROCEDURE — 82436 ASSAY OF URINE CHLORIDE: CPT

## 2017-06-19 PROCEDURE — 84156 ASSAY OF PROTEIN URINE: CPT

## 2017-06-19 PROCEDURE — 84300 ASSAY OF URINE SODIUM: CPT

## 2017-06-19 PROCEDURE — 86850 RBC ANTIBODY SCREEN: CPT

## 2017-06-19 PROCEDURE — 83735 ASSAY OF MAGNESIUM: CPT

## 2017-06-19 PROCEDURE — 82306 VITAMIN D 25 HYDROXY: CPT

## 2017-06-19 PROCEDURE — 99285 EMERGENCY DEPT VISIT HI MDM: CPT | Mod: 25

## 2017-06-19 PROCEDURE — 88305 TISSUE EXAM BY PATHOLOGIST: CPT | Mod: 26

## 2017-06-19 PROCEDURE — 36415 COLL VENOUS BLD VENIPUNCTURE: CPT

## 2017-06-19 PROCEDURE — 99239 HOSP IP/OBS DSCHRG MGMT >30: CPT

## 2017-06-19 PROCEDURE — 76775 US EXAM ABDO BACK WALL LIM: CPT

## 2017-06-19 PROCEDURE — 85027 COMPLETE CBC AUTOMATED: CPT

## 2017-06-19 PROCEDURE — 82728 ASSAY OF FERRITIN: CPT

## 2017-06-19 RX ORDER — SIMVASTATIN 20 MG/1
1 TABLET, FILM COATED ORAL
Qty: 30 | Refills: 0
Start: 2017-06-19 | End: 2017-07-19

## 2017-06-19 RX ORDER — ERGOCALCIFEROL 1.25 MG/1
1 CAPSULE ORAL
Qty: 8 | Refills: 0 | OUTPATIENT
Start: 2017-06-19

## 2017-06-19 RX ORDER — SIMVASTATIN 20 MG/1
1 TABLET, FILM COATED ORAL
Qty: 30 | Refills: 0 | OUTPATIENT
Start: 2017-06-19 | End: 2017-07-19

## 2017-06-19 RX ORDER — INSULIN LISPRO 100/ML
10 VIAL (ML) SUBCUTANEOUS
Qty: 1 | Refills: 0 | OUTPATIENT
Start: 2017-06-19 | End: 2017-07-19

## 2017-06-19 RX ORDER — SODIUM CHLORIDE 9 MG/ML
1000 INJECTION, SOLUTION INTRAVENOUS
Qty: 0 | Refills: 0 | Status: DISCONTINUED | OUTPATIENT
Start: 2017-06-19 | End: 2017-06-19

## 2017-06-19 RX ORDER — CALCITRIOL 0.5 UG/1
0.25 CAPSULE ORAL DAILY
Qty: 0 | Refills: 0 | Status: DISCONTINUED | OUTPATIENT
Start: 2017-06-19 | End: 2017-06-19

## 2017-06-19 RX ORDER — SIMVASTATIN 20 MG/1
1 TABLET, FILM COATED ORAL
Qty: 0 | Refills: 0 | COMMUNITY

## 2017-06-19 RX ORDER — AMLODIPINE BESYLATE 2.5 MG/1
1 TABLET ORAL
Qty: 30 | Refills: 0
Start: 2017-06-19 | End: 2017-07-19

## 2017-06-19 RX ORDER — INSULIN GLARGINE 100 [IU]/ML
0 INJECTION, SOLUTION SUBCUTANEOUS
Qty: 0 | Refills: 0 | COMMUNITY

## 2017-06-19 RX ORDER — TAMSULOSIN HYDROCHLORIDE 0.4 MG/1
1 CAPSULE ORAL
Qty: 30 | Refills: 0
Start: 2017-06-19 | End: 2017-07-19

## 2017-06-19 RX ORDER — INSULIN GLARGINE 100 [IU]/ML
30 INJECTION, SOLUTION SUBCUTANEOUS
Qty: 0 | Refills: 0 | DISCHARGE
Start: 2017-06-19 | End: 2017-07-19

## 2017-06-19 RX ORDER — CALCITRIOL 0.5 UG/1
1 CAPSULE ORAL
Qty: 30 | Refills: 0
Start: 2017-06-19 | End: 2017-07-19

## 2017-06-19 RX ORDER — INSULIN GLARGINE 100 [IU]/ML
10 INJECTION, SOLUTION SUBCUTANEOUS
Qty: 1 | Refills: 0 | OUTPATIENT
Start: 2017-06-19 | End: 2017-07-19

## 2017-06-19 RX ORDER — ERGOCALCIFEROL 1.25 MG/1
1 CAPSULE ORAL
Qty: 1 | Refills: 0
Start: 2017-06-19 | End: 2017-06-27

## 2017-06-19 RX ORDER — CEPHALEXIN 500 MG
1 CAPSULE ORAL
Qty: 2 | Refills: 0 | OUTPATIENT
Start: 2017-06-19 | End: 2017-06-20

## 2017-06-19 RX ORDER — CEPHALEXIN 500 MG
1 CAPSULE ORAL
Qty: 2 | Refills: 0
Start: 2017-06-19 | End: 2017-06-20

## 2017-06-19 RX ORDER — ZOLPIDEM TARTRATE 10 MG/1
1 TABLET ORAL
Qty: 0 | Refills: 0 | COMMUNITY

## 2017-06-19 RX ORDER — DOCUSATE SODIUM 100 MG
1 CAPSULE ORAL
Qty: 30 | Refills: 0
Start: 2017-06-19 | End: 2017-07-19

## 2017-06-19 RX ORDER — ALPRAZOLAM 0.25 MG
1 TABLET ORAL
Qty: 0 | Refills: 0 | COMMUNITY

## 2017-06-19 RX ORDER — SENNA PLUS 8.6 MG/1
2 TABLET ORAL
Qty: 60 | Refills: 0 | OUTPATIENT
Start: 2017-06-19 | End: 2017-07-19

## 2017-06-19 RX ORDER — ALBUTEROL 90 UG/1
2 AEROSOL, METERED ORAL
Qty: 0 | Refills: 0 | COMMUNITY

## 2017-06-19 RX ORDER — TAMSULOSIN HYDROCHLORIDE 0.4 MG/1
1 CAPSULE ORAL
Qty: 30 | Refills: 0 | OUTPATIENT
Start: 2017-06-19 | End: 2017-07-19

## 2017-06-19 RX ORDER — ALBUTEROL 90 UG/1
1 AEROSOL, METERED ORAL
Qty: 1 | Refills: 0 | OUTPATIENT
Start: 2017-06-19 | End: 2017-07-19

## 2017-06-19 RX ORDER — LEVOTHYROXINE SODIUM 125 MCG
1 TABLET ORAL
Qty: 0 | Refills: 0 | COMMUNITY

## 2017-06-19 RX ORDER — INSULIN LISPRO 100/ML
0 VIAL (ML) SUBCUTANEOUS
Qty: 0 | Refills: 0 | DISCHARGE
Start: 2017-06-19 | End: 2017-07-19

## 2017-06-19 RX ORDER — METFORMIN HYDROCHLORIDE 850 MG/1
1 TABLET ORAL
Qty: 0 | Refills: 0 | COMMUNITY

## 2017-06-19 RX ORDER — INSULIN LISPRO 100/ML
0 VIAL (ML) SUBCUTANEOUS
Qty: 0 | Refills: 0 | COMMUNITY

## 2017-06-19 RX ORDER — ALPRAZOLAM 0.25 MG
1 TABLET ORAL
Qty: 0 | Refills: 0 | DISCHARGE
Start: 2017-06-19

## 2017-06-19 RX ORDER — CALCITRIOL 0.5 UG/1
1 CAPSULE ORAL
Qty: 0 | Refills: 0 | COMMUNITY
Start: 2017-06-19

## 2017-06-19 RX ORDER — ERGOCALCIFEROL 1.25 MG/1
50000 CAPSULE ORAL
Qty: 0 | Refills: 0 | Status: DISCONTINUED | OUTPATIENT
Start: 2017-06-19 | End: 2017-06-19

## 2017-06-19 RX ORDER — ALUMINUM HYDROXIDE AND MAGNESIUM TRISILICATE 80; 14.2 MG/1; MG/1
0 TABLET, CHEWABLE ORAL
Qty: 0 | Refills: 0 | COMMUNITY

## 2017-06-19 RX ORDER — SENNA PLUS 8.6 MG/1
2 TABLET ORAL
Qty: 60 | Refills: 0
Start: 2017-06-19 | End: 2017-07-19

## 2017-06-19 RX ORDER — LEVOTHYROXINE SODIUM 125 MCG
1 TABLET ORAL
Qty: 30 | Refills: 0
Start: 2017-06-19 | End: 2017-07-19

## 2017-06-19 RX ORDER — INSULIN GLARGINE 100 [IU]/ML
10 INJECTION, SOLUTION SUBCUTANEOUS
Qty: 300 | Refills: 0 | OUTPATIENT
Start: 2017-06-19 | End: 2017-07-19

## 2017-06-19 RX ADMIN — CEFTRIAXONE 100 GRAM(S): 500 INJECTION, POWDER, FOR SOLUTION INTRAMUSCULAR; INTRAVENOUS at 13:07

## 2017-06-19 RX ADMIN — Medication 1: at 13:07

## 2017-06-19 RX ADMIN — ERGOCALCIFEROL 50000 UNIT(S): 1.25 CAPSULE ORAL at 13:07

## 2017-06-19 RX ADMIN — Medication 10 MILLIGRAM(S): at 05:09

## 2017-06-19 RX ADMIN — Medication 100 MILLIGRAM(S): at 15:25

## 2017-06-19 RX ADMIN — Medication 75 MICROGRAM(S): at 05:09

## 2017-06-19 RX ADMIN — Medication 0.5 MILLIGRAM(S): at 08:29

## 2017-06-19 RX ADMIN — Medication 650 MILLIGRAM(S): at 15:25

## 2017-06-19 RX ADMIN — Medication 100 MILLIGRAM(S): at 05:09

## 2017-06-19 RX ADMIN — Medication 1: at 08:05

## 2017-06-19 RX ADMIN — CALCITRIOL 0.25 MICROGRAM(S): 0.5 CAPSULE ORAL at 13:07

## 2017-06-19 RX ADMIN — PANTOPRAZOLE SODIUM 40 MILLIGRAM(S): 20 TABLET, DELAYED RELEASE ORAL at 05:09

## 2017-06-19 RX ADMIN — Medication 10 MILLIGRAM(S): at 15:25

## 2017-06-19 RX ADMIN — Medication 11 UNIT(S): at 13:07

## 2017-06-19 RX ADMIN — Medication 650 MILLIGRAM(S): at 05:09

## 2017-06-19 NOTE — PROGRESS NOTE ADULT - PROBLEM SELECTOR PLAN 10
DVT ppx
DVT ppx
Stable since 1996.  Patient with no change in voice.
Stable since 1996.  Patient with no change in voice.
DVT ppx

## 2017-06-19 NOTE — PROGRESS NOTE ADULT - ATTENDING COMMENTS
Note addended where needed. If pt remains medically stable post procedure will d/c home to f/u with PMD/nephro and ct sx as an outpt.

## 2017-06-19 NOTE — PROGRESS NOTE ADULT - PROBLEM SELECTOR PLAN 3
HBa1c 7.0 %  -FS in mid 100's to low 200's;  room to increase Lantus at night;  -c/w accuchecks ACHS TID   -c/w HSS   -c/w hypoglycemia protocol  -pt will be d/c home on lantus 10 u sq qhs and humalog 10 units and metformin will be discontinued

## 2017-06-19 NOTE — PROGRESS NOTE ADULT - PROBLEM SELECTOR PROBLEM 1
Acute on chronic kidney failure
Acute on chronic renal failure
Lung nodule
Acute on chronic renal failure

## 2017-06-19 NOTE — PROGRESS NOTE ADULT - SUBJECTIVE AND OBJECTIVE BOX
Renal :    cc: 76 yr old male sent to the ER for elevated BUN/Cr at presurgical testing.     Patient seen and examined at bedside, No acute overnight events.  Patient is comfortable and slept well.     Denies any chest pain, SOB, NVD, dysuria, headache, dizziness, palpitations, flank pain, fever, chills.    Patient thakur drained 700cc overnight.      VS: Vital Signs Last 24 Hrs  T(C): 36.8, Max: 37.1 (06-18 @ 07:31)  T(F): 98.2, Max: 98.8 (06-18 @ 07:31)   Afebrile  HR: 69 (69 - 73)  BP: 127/59 (115/58 - 127/59)  Normotensive  RR: 18 (18 - 18)  SpO2: 97% (93% - 97%)      PHYSICAL EXAM:  General: NAD, resting comfortably in bed  HEENT: NC/AT, PERRLA, EOMI  Neck: thyroid normal, no nodules, no lymphadenopathy, no JVD  Lungs: crackles heard at right lung base, no wheezes,  Cardio: S1S2+, RRR, no murmurs  Abdominal: soft, NT, BS+, distended  : thakur in place draining well   Back: no CVA tenderness, no ulcers visualized in the back  Extremities: no edema, no calf tenderness, no ulcers in the feet  Neuro: AAOx3, CN 2-12 grossly intact. Sensation intact in all extremities, 5/5 strength    CAPILLARY BLOOD GLUCOSE  206 (18 Jun 2017 22:24)  163 (18 Jun 2017 16:16)  207 (18 Jun 2017 10:41)  154 (18 Jun 2017 07:58)    f/s=  154/        207/    163/   206            8am/   11am/     4pm/  10:30pm           1unit/   2unit/     1unit/    2unit          (sliding scale)    sliding scale 1unit 8am, 2unit 11am,  1 unit 4pm, 2 unit 10:30pm    11units Standing Premeal Humalog  10 units Lantus bedtime    Labs:                        11.5   5.6   )-----------( 159      ( 15 Alexander 2017 06:35 )             33.4   06-16    139  |  104  |  62.0<H>  ----------------------------<  169<H>  4.2   |  20.0<L>  |  3.11<H>    Ca    8.2<L>      16 Jun 2017 07:50  Phos  3.7     06-15  Mg     2.3     06-15    TPro  6.3<L>  /  Alb  3.0<L>  /  TBili  0.8  /  DBili  x   /  AST  18  /  ALT  21  /  AlkPhos  54  06-16      MEDICATIONS  (STANDING):  simvastatin 40milliGRAM(s) Oral at bedtime  levothyroxine 75MICROGram(s) Oral daily  insulin lispro (HumaLOG) corrective regimen sliding scale  SubCutaneous Before meals and at bedtime  dextrose 5%. 1000milliLiter(s) IV Continuous <Continuous>  dextrose 50% Injectable 12.5Gram(s) IV Push once  dextrose 50% Injectable 25Gram(s) IV Push once  dextrose 50% Injectable 25Gram(s) IV Push once  pantoprazole    Tablet 40milliGRAM(s) Oral before breakfast  hydrALAZINE 10milliGRAM(s) Oral three times a day  tamsulosin 0.4milliGRAM(s) Oral at bedtime  senna 2Tablet(s) Oral at bedtime  docusate sodium 100milliGRAM(s) Oral three times a day  cefTRIAXone   IVPB 1Gram(s) IV Intermittent every 24 hours  sodium bicarbonate 650milliGRAM(s) Oral three times a day  insulin lispro Injectable (HumaLOG) 11Unit(s) SubCutaneous three times a day before meals  insulin glargine Injectable (LANTUS) 10Unit(s) SubCutaneous at bedtime  enoxaparin Injectable 30milliGRAM(s) SubCutaneous daily  dextrose 5% + sodium chloride 0.45%. 1000milliLiter(s) IV Continuous <Continuous>  ergocalciferol 97580Jjfs(s) Oral every week  calcitriol   Capsule 0.25MICROGram(s) Oral daily    MEDICATIONS  (PRN):  ALBUTerol    0.083% 2.5milliGRAM(s) Nebulizer every 2 hours PRN Shortness of Breath and/or Wheezing  dextrose Gel 1Dose(s) Oral once PRN Blood Glucose LESS THAN 70 milliGRAM(s)/deciliter  glucagon  Injectable 1milliGRAM(s) IntraMuscular once PRN Glucose LESS THAN 70 milligrams/deciliter  hydrALAZINE Injectable 10milliGRAM(s) IV Push every 6 hours PRN SBP>160 or DBP>110  ALPRAZolam 0.5milliGRAM(s) Oral every 8 hours PRN severe anxiety  polyethylene glycol 3350 17Gram(s) Oral daily PRN Constipation      CT Abd & Pelvis:    No acute findings. Bilateral perinephric fat stranding. Bladder   moderately distended, mildly enlarged prostate, correlate with urine   output and PSA levels.    Other incidental findings as above.    CT Chest ,    Findings:    There is pleural thickening in the right upper lobe and along the   fissure. A 2 x 1.5 cm mass is seen in the right upper lobe. Further   images demonstrate the biopsy needle within the periphery of the mass.   Postprocedure imaging shows a fiducial coils within the mass. No   pneumothorax is seen. No hematoma    IMPRESSION:  SUCCESSFUL CORE BIOPSY AND FIDUCIAL MARKER PLACEMENT OF A RIGHT UPPER   LOBE PULMONARY MASS

## 2017-06-19 NOTE — PROGRESS NOTE ADULT - SUBJECTIVE AND OBJECTIVE BOX
cc: 76 yr old male sent to the ER for elevated BUN/Cr at presurgical testing.     Patient seen and examined at bedside, No acute overnight events.  Patient is comfortable and slept well.     Denies any chest pain, SOB, NVD, dysuria, headache, dizziness, palpitations, flank pain, fever, chills.    Patient thakur drained 700cc overnight.  Patient had a BM last night without difficulty    VS: Vital Signs Last 24 Hrs  T(C): 36.8, Max: 37.1 (06-18 @ 07:31)  T(F): 98.2, Max: 98.8 (06-18 @ 07:31)   Afebrile  HR: 69 (69 - 73)  BP: 127/59 (115/58 - 127/59)  Normotensive  RR: 18 (18 - 18)  SpO2: 97% (93% - 97%)      PHYSICAL EXAM:  General: NAD, resting comfortably in bed  HEENT: NC/AT, PERRLA, EOMI  Neck: thyroid normal, no nodules, no lymphadenopathy, no JVD  Lungs: crackles heard at right lung base, no wheezes,  Cardio: S1S2+, RRR, no murmurs  Abdominal: soft, NT, BS+, distended  : thakur in place draining well   Back: no CVA tenderness, no ulcers visualized in the back  Extremities: no edema, no calf tenderness, no ulcers in the feet  Neuro: AAOx3, CN 2-12 grossly intact. Sensation intact in all extremities, 5/5 strength    CAPILLARY BLOOD GLUCOSE  206 (18 Jun 2017 22:24)  163 (18 Jun 2017 16:16)  207 (18 Jun 2017 10:41)  154 (18 Jun 2017 07:58)    f/s=  154/        207/    163/   206            8am/   11am/     4pm/  10:30pm           1unit/   2unit/     1unit/    2unit          (sliding scale)    sliding scale 1unit 8am, 2unit 11am,  1 unit 4pm, 2 unit 10:30pm    11units Standing Premeal Humalog  10 units Lantus bedtime    Labs:                        11.5   5.6   )-----------( 159      ( 15 Alexander 2017 06:35 )             33.4   06-16    139  |  104  |  62.0<H>  ----------------------------<  169<H>  4.2   |  20.0<L>  |  3.11<H>    Ca    8.2<L>      16 Jun 2017 07:50  Phos  3.7     06-15  Mg     2.3     06-15    TPro  6.3<L>  /  Alb  3.0<L>  /  TBili  0.8  /  DBili  x   /  AST  18  /  ALT  21  /  AlkPhos  54  06-16      MEDICATIONS  (STANDING):  simvastatin 40milliGRAM(s) Oral at bedtime  levothyroxine 75MICROGram(s) Oral daily  insulin lispro (HumaLOG) corrective regimen sliding scale  SubCutaneous Before meals and at bedtime  dextrose 5%. 1000milliLiter(s) IV Continuous <Continuous>  dextrose 50% Injectable 12.5Gram(s) IV Push once  dextrose 50% Injectable 25Gram(s) IV Push once  dextrose 50% Injectable 25Gram(s) IV Push once  pantoprazole    Tablet 40milliGRAM(s) Oral before breakfast  hydrALAZINE 10milliGRAM(s) Oral three times a day  tamsulosin 0.4milliGRAM(s) Oral at bedtime  senna 2Tablet(s) Oral at bedtime  docusate sodium 100milliGRAM(s) Oral three times a day  cefTRIAXone   IVPB 1Gram(s) IV Intermittent every 24 hours  sodium bicarbonate 650milliGRAM(s) Oral three times a day  insulin lispro Injectable (HumaLOG) 11Unit(s) SubCutaneous three times a day before meals  insulin glargine Injectable (LANTUS) 10Unit(s) SubCutaneous at bedtime  enoxaparin Injectable 30milliGRAM(s) SubCutaneous daily  dextrose 5% + sodium chloride 0.45%. 1000milliLiter(s) IV Continuous <Continuous>  ergocalciferol 98707Hlzg(s) Oral every week  calcitriol   Capsule 0.25MICROGram(s) Oral daily    MEDICATIONS  (PRN):  ALBUTerol    0.083% 2.5milliGRAM(s) Nebulizer every 2 hours PRN Shortness of Breath and/or Wheezing  dextrose Gel 1Dose(s) Oral once PRN Blood Glucose LESS THAN 70 milliGRAM(s)/deciliter  glucagon  Injectable 1milliGRAM(s) IntraMuscular once PRN Glucose LESS THAN 70 milligrams/deciliter  hydrALAZINE Injectable 10milliGRAM(s) IV Push every 6 hours PRN SBP>160 or DBP>110  ALPRAZolam 0.5milliGRAM(s) Oral every 8 hours PRN severe anxiety  polyethylene glycol 3350 17Gram(s) Oral daily PRN Constipation      CT Abd & Pelvis:    No acute findings. Bilateral perinephric fat stranding. Bladder   moderately distended, mildly enlarged prostate, correlate with urine   output and PSA levels.    Other incidental findings as above.

## 2017-06-19 NOTE — PROGRESS NOTE ADULT - PROBLEM SELECTOR PLAN 2
Pt has hx of vocal cord cancer was planned to have outpt RUL bx which is now planned for monday today)  as an inpatient while the patient is here being monitored for his renal function   -Patient being followed by CT surgery Dr Collins.  -s/p Bx without any complication and if cxr post procedure wnl then will d/c patient home  -pt to f/u ctsx as an outpt to f/u bx results

## 2017-06-19 NOTE — BRIEF OPERATIVE NOTE - OPERATION/FINDINGS
two 1 cm cores in formalin.  + touch prep acc to Dr Damico.  two gold fiducials in center of mass.  Biosentry Tract Sealant used.  No PTX.  No hematoma

## 2017-06-19 NOTE — PROGRESS NOTE ADULT - ASSESSMENT
A & P :76 yr old male with PMH of COPD on CPAP (no home oxygen, never intubated), DM on insulin, HTN, lung nodule, vocal cord cancer, GEORGE, hypothyroidism-  admitted for acute on chronic renal failure clinically improving to patients baseline creatinine.     Patient is experiencing urinary retention, Patient was also taking metformin for his DM which is likely contributing to the renal failure.   Appears improved clinically.      Today: S/P CT guided biopsy of right lung mass .  Ceftriaxone Antibiotic Day 4  (start on 6/16/17);  planned to end tomorrow;

## 2017-06-19 NOTE — PROGRESS NOTE ADULT - PROBLEM SELECTOR PLAN 1
Afebrile, no dysuria; Hemodynamically stable;  optimized for procedure;  creatinine trending down;     Acute of chronic kidney failure stage 4. Improving creatine and acidosis   -Renal U/S was unremarkable for hydronephrosis, mass, or stones.  -CT Abd/pelvis: No acute findings. Bilateral perinephric fat stranding. Bladder   moderately distended, mildly enlarged prostate.  -Empirically tx for UTI given UA results  will c/w rocephin 1G IVPB QD D#4/5 will d/c abx in the am -Urine culture shows no growth  -Neprho f/u noted and appreciated and d/w Dr. Ward in regards to the plan of care and sodium bicarb IVF discontinued and switched to sodium bicarbonate po tid. Also discontinued thakur catheter for TOV and if patient does not void > 8 hrs will perform bladder scan and re-insert thakur if needed. -c/w flomax 0.4mg po qhs Afebrile, no dysuria; Hemodynamically stable;  optimized for procedure;  creatinine trending down; today 1.8 and patients baseline is 1.7   Acute of chronic kidney failure Improving creatine and acidosis   -Renal U/S was unremarkable for hydronephrosis, mass, or stones.  -CT Abd/pelvis: No acute findings. Bilateral perinephric fat stranding. Bladder   moderately distended, mildly enlarged prostate.  -Empirically tx for UTI given UA results  will c/w rocephin 1G IVPB QD D#4/5 will d/c on one more day of keflex to finish the course of tx   -c/w flomax 0.4mg po qhs  -pt to f/u nephrology as an outpt. Discontinued sodium bicarbonate bc acidosis resolved.

## 2017-06-19 NOTE — PROGRESS NOTE ADULT - PROBLEM SELECTOR PLAN 1
Afebrile, no dysuria; Hemodynamically stable;  optimized , Serum creatinine trending down; today 1.8 and patients baseline is 1.7 mg.,  Acute of chronic kidney failure - Improving creatinine and acidosis   -Renal U/S was unremarkable for hydronephrosis, mass, or stones.  -CT Abd/pelvis: No acute findings. Bilateral perinephric fat stranding. Bladder   moderately distended, mildly enlarged prostate.  -Empirically tx for UTI given UA results  will c/w rocephin 1G IVPB QD D#4/5 will d/c on one more day of keflex to finish the course of tx   -c/w  flomax  0.4mg po qhs

## 2017-06-19 NOTE — PROGRESS NOTE ADULT - ASSESSMENT
76 yr old male with PMH of COPD on CPAP (no home oxygen, never intubated), DM on insulin, HTN, HLD, lung nodule, vocal cord cancer, GEORGE, hypothyroidism, GERD, hemorrhoids, anxiety admitted for acute on chronic renal failure stage 5.      Patient is experiencing urinary retention 2/2 acute on chronic renal failure..  Patient was also taking metformin for his DM which is likely contributing to the renal failure.  Patient is followed by nephrology Dr Peters who is treating the patient with sodium bicarb drip and venofer for anemia.  Appears improved clinically.      Today:  Dr Gibbons was notified that the patient is in the hospital and is scheduled for CT guided biopsy of right lung mass on Today 6/19/17.  Ceftriaxone Antibiotic Day 4  (start on 6/16/17);  planned to end tomorrow; 76 yr old male with PMH of COPD on CPAP (no home oxygen, never intubated), DM on insulin, HTN, HLD, lung nodule, vocal cord cancer, GEORGE, hypothyroidism, GERD, hemorrhoids, anxiety admitted for acute on chronic renal failure clinically improving to patients baseline creatine.     Patient is experiencing urinary retention 2/2 acute on chronic renal failure..  Patient was also taking metformin for his DM which is likely contributing to the renal failure.  Patient is followed by nephrology Dr Peters who is treating the patient with sodium bicarb drip and venofer for anemia.  Appears improved clinically.      Today:  Dr Gibbons was notified that the patient is in the hospital and is scheduled for CT guided biopsy of right lung mass on Today 6/19/17.  Ceftriaxone Antibiotic Day 4  (start on 6/16/17);  planned to end tomorrow;

## 2017-06-19 NOTE — PROGRESS NOTE ADULT - PROBLEM SELECTOR PLAN 3
HBa1c 7.0   -FS in mid 100's to low 200's;  room to increase Lantus at night;  -c/w accuchecks ACHS TID   -c/w HSS   -c/w hypoglycemia protocol HBa1c 7.0   -FS in mid 100's to low 200's;  room to increase Lantus at night;  -c/w accuchecks ACHS TID   -c/w HSS   -c/w hypoglycemia protocol  -pt will be d/c home on lantus 10 u sq qhs and humalog 10 units and metformin will be discontinued

## 2017-06-19 NOTE — PROGRESS NOTE ADULT - PROBLEM SELECTOR PLAN 2
Pt has hx of vocal cord cancer was planned to have outpt RUL bx which is now planned for monday today)  as an inpatient while the patient is here being monitored for his renal function   -Patient being followed by CT surgery Dr Collins.  -Awaiting biopsy scheduled with Dr Gibbons 6/19. Pt has hx of vocal cord cancer was planned to have outpt RUL bx which is now planned for monday today)  as an inpatient while the patient is here being monitored for his renal function   -Patient being followed by CT surgery Dr Collins.  -s/p Bx without any complication and if cxr post procedure wnl then will d/c patient home  -pt to f/u ctsx as an outpt to f/u bx results

## 2017-06-20 LAB — SURGICAL PATHOLOGY FINAL REPORT - CH: SIGNIFICANT CHANGE UP

## 2017-06-23 LAB — PATH REPORT ADDENDUM.SYNOPTIC DOC: SIGNIFICANT CHANGE UP

## 2017-06-26 ENCOUNTER — APPOINTMENT (OUTPATIENT)
Dept: THORACIC SURGERY | Facility: CLINIC | Age: 77
End: 2017-06-26

## 2017-06-26 VITALS
BODY MASS INDEX: 32.5 KG/M2 | HEIGHT: 70 IN | OXYGEN SATURATION: 96 % | DIASTOLIC BLOOD PRESSURE: 70 MMHG | RESPIRATION RATE: 16 BRPM | HEART RATE: 70 BPM | SYSTOLIC BLOOD PRESSURE: 144 MMHG | WEIGHT: 227 LBS

## 2017-06-30 ENCOUNTER — OUTPATIENT (OUTPATIENT)
Dept: OUTPATIENT SERVICES | Facility: HOSPITAL | Age: 77
LOS: 1 days | Discharge: HOME | End: 2017-06-30

## 2017-06-30 DIAGNOSIS — Z90.49 ACQUIRED ABSENCE OF OTHER SPECIFIED PARTS OF DIGESTIVE TRACT: Chronic | ICD-10-CM

## 2017-06-30 DIAGNOSIS — Z96.659 PRESENCE OF UNSPECIFIED ARTIFICIAL KNEE JOINT: Chronic | ICD-10-CM

## 2017-06-30 DIAGNOSIS — C32.0 MALIGNANT NEOPLASM OF GLOTTIS: Chronic | ICD-10-CM

## 2017-07-05 ENCOUNTER — APPOINTMENT (OUTPATIENT)
Dept: RADIATION ONCOLOGY | Facility: CLINIC | Age: 77
End: 2017-07-05

## 2017-07-05 ENCOUNTER — OUTPATIENT (OUTPATIENT)
Dept: OUTPATIENT SERVICES | Facility: HOSPITAL | Age: 77
LOS: 1 days | Discharge: HOME | End: 2017-07-05

## 2017-07-05 VITALS
WEIGHT: 219.2 LBS | OXYGEN SATURATION: 95 % | BODY MASS INDEX: 31.38 KG/M2 | DIASTOLIC BLOOD PRESSURE: 74 MMHG | TEMPERATURE: 97.4 F | HEART RATE: 74 BPM | RESPIRATION RATE: 16 BRPM | SYSTOLIC BLOOD PRESSURE: 144 MMHG | HEIGHT: 70 IN

## 2017-07-05 DIAGNOSIS — Z96.659 PRESENCE OF UNSPECIFIED ARTIFICIAL KNEE JOINT: Chronic | ICD-10-CM

## 2017-07-05 DIAGNOSIS — R91.1 SOLITARY PULMONARY NODULE: ICD-10-CM

## 2017-07-05 DIAGNOSIS — C32.0 MALIGNANT NEOPLASM OF GLOTTIS: Chronic | ICD-10-CM

## 2017-07-05 DIAGNOSIS — Z90.49 ACQUIRED ABSENCE OF OTHER SPECIFIED PARTS OF DIGESTIVE TRACT: Chronic | ICD-10-CM

## 2017-07-05 RX ORDER — METFORMIN HYDROCHLORIDE 1000 MG/1
1000 TABLET, EXTENDED RELEASE ORAL TWICE DAILY
Refills: 0 | Status: DISCONTINUED | COMMUNITY
End: 2017-07-05

## 2017-07-05 RX ORDER — METFORMIN ER 500 MG 500 MG/1
500 TABLET ORAL
Qty: 360 | Refills: 0 | Status: COMPLETED | COMMUNITY
Start: 2016-10-07

## 2017-07-05 RX ORDER — CEPHALEXIN 500 MG/1
500 CAPSULE ORAL
Qty: 2 | Refills: 0 | Status: COMPLETED | COMMUNITY
Start: 2017-06-19

## 2017-07-24 ENCOUNTER — APPOINTMENT (OUTPATIENT)
Dept: NEPHROLOGY | Facility: CLINIC | Age: 77
End: 2017-07-24

## 2017-07-24 VITALS
DIASTOLIC BLOOD PRESSURE: 58 MMHG | HEIGHT: 70 IN | WEIGHT: 223 LBS | BODY MASS INDEX: 31.92 KG/M2 | SYSTOLIC BLOOD PRESSURE: 120 MMHG

## 2017-07-24 DIAGNOSIS — E55.9 VITAMIN D DEFICIENCY, UNSPECIFIED: ICD-10-CM

## 2017-07-24 DIAGNOSIS — Z86.39 PERSONAL HISTORY OF OTHER ENDOCRINE, NUTRITIONAL AND METABOLIC DISEASE: ICD-10-CM

## 2017-07-24 DIAGNOSIS — I10 ESSENTIAL (PRIMARY) HYPERTENSION: ICD-10-CM

## 2017-07-24 DIAGNOSIS — E78.00 PURE HYPERCHOLESTEROLEMIA, UNSPECIFIED: ICD-10-CM

## 2017-07-24 RX ORDER — VALSARTAN AND HYDROCHLOROTHIAZIDE 80; 12.5 MG/1; MG/1
80-12.5 TABLET, FILM COATED ORAL
Refills: 0 | Status: DISCONTINUED | COMMUNITY
End: 2017-07-24

## 2017-07-24 RX ORDER — UMECLIDINIUM 62.5 UG/1
62.5 AEROSOL, POWDER ORAL DAILY
Qty: 1 | Refills: 5 | Status: DISCONTINUED | COMMUNITY
Start: 2017-06-01 | End: 2017-07-24

## 2017-07-24 RX ORDER — ALUMINUM HYDROXIDE AND MAGNESIUM TRISILICATE 80; 14.2 MG/1; MG/1
80-14.2 TABLET, CHEWABLE ORAL
Refills: 0 | Status: DISCONTINUED | COMMUNITY
End: 2017-07-24

## 2017-07-24 RX ORDER — VALSARTAN AND HYDROCHLOROTHIAZIDE 160; 12.5 MG/1; MG/1
160-12.5 TABLET, FILM COATED ORAL
Qty: 90 | Refills: 0 | Status: DISCONTINUED | COMMUNITY
Start: 2016-09-08 | End: 2017-07-24

## 2017-07-26 PROBLEM — E55.9 VITAMIN D DEFICIENCY: Status: ACTIVE | Noted: 2017-07-26

## 2017-07-26 LAB
25(OH)D3 SERPL-MCNC: 25.1 NG/ML
ALBUMIN SERPL ELPH-MCNC: 4.2 G/DL
ANION GAP SERPL CALC-SCNC: 16 MMOL/L
APPEARANCE: CLEAR
BACTERIA: NEGATIVE
BASOPHILS # BLD AUTO: 0.02 K/UL
BASOPHILS NFR BLD AUTO: 0.3 %
BILIRUBIN URINE: NEGATIVE
BLOOD URINE: NEGATIVE
BUN SERPL-MCNC: 27 MG/DL
CALCIUM SERPL-MCNC: 9.3 MG/DL
CALCIUM SERPL-MCNC: 9.3 MG/DL
CHLORIDE SERPL-SCNC: 100 MMOL/L
CO2 SERPL-SCNC: 22 MMOL/L
COLOR: YELLOW
CREAT SERPL-MCNC: 1.74 MG/DL
CREAT SPEC-SCNC: 91 MG/DL
CREAT/PROT UR: 0.1 RATIO
EOSINOPHIL # BLD AUTO: 0.13 K/UL
EOSINOPHIL NFR BLD AUTO: 2.2 %
GLUCOSE QUALITATIVE U: NORMAL MG/DL
GLUCOSE SERPL-MCNC: 197 MG/DL
HBA1C MFR BLD HPLC: 7.8 %
HCT VFR BLD CALC: 40.3 %
HGB BLD-MCNC: 12.9 G/DL
IMM GRANULOCYTES NFR BLD AUTO: 0.3 %
KETONES URINE: NEGATIVE
LEUKOCYTE ESTERASE URINE: NEGATIVE
LYMPHOCYTES # BLD AUTO: 1.59 K/UL
LYMPHOCYTES NFR BLD AUTO: 26.7 %
MAN DIFF?: NORMAL
MCHC RBC-ENTMCNC: 30.3 PG
MCHC RBC-ENTMCNC: 32 GM/DL
MCV RBC AUTO: 94.6 FL
MICROSCOPIC-UA: NORMAL
MONOCYTES # BLD AUTO: 0.51 K/UL
MONOCYTES NFR BLD AUTO: 8.6 %
NEUTROPHILS # BLD AUTO: 3.68 K/UL
NEUTROPHILS NFR BLD AUTO: 61.9 %
NITRITE URINE: NEGATIVE
PARATHYROID HORMONE INTACT: 38 PG/ML
PH URINE: 5.5
PHOSPHATE SERPL-MCNC: 3.3 MG/DL
PLATELET # BLD AUTO: 161 K/UL
POTASSIUM SERPL-SCNC: 5.1 MMOL/L
PROT UR-MCNC: 7 MG/DL
PROTEIN URINE: NEGATIVE MG/DL
RBC # BLD: 4.26 M/UL
RBC # FLD: 14.7 %
RED BLOOD CELLS URINE: 1 /HPF
SODIUM SERPL-SCNC: 138 MMOL/L
SPECIFIC GRAVITY URINE: 1.01
SQUAMOUS EPITHELIAL CELLS: 0 /HPF
UROBILINOGEN URINE: NORMAL MG/DL
WBC # FLD AUTO: 5.95 K/UL
WHITE BLOOD CELLS URINE: 0 /HPF

## 2017-08-07 VITALS
OXYGEN SATURATION: 95 % | WEIGHT: 224 LBS | DIASTOLIC BLOOD PRESSURE: 75 MMHG | SYSTOLIC BLOOD PRESSURE: 183 MMHG | TEMPERATURE: 97.5 F | HEART RATE: 75 BPM | BODY MASS INDEX: 32.07 KG/M2 | RESPIRATION RATE: 16 BRPM | HEIGHT: 70 IN

## 2017-08-14 VITALS
DIASTOLIC BLOOD PRESSURE: 73 MMHG | RESPIRATION RATE: 16 BRPM | HEIGHT: 70 IN | OXYGEN SATURATION: 97 % | BODY MASS INDEX: 31.9 KG/M2 | TEMPERATURE: 97.4 F | SYSTOLIC BLOOD PRESSURE: 137 MMHG | HEART RATE: 66 BPM | WEIGHT: 222.8 LBS

## 2017-08-16 ENCOUNTER — APPOINTMENT (OUTPATIENT)
Age: 77
End: 2017-08-16

## 2017-09-05 ENCOUNTER — APPOINTMENT (OUTPATIENT)
Dept: PULMONOLOGY | Facility: CLINIC | Age: 77
End: 2017-09-05
Payer: MEDICARE

## 2017-09-05 VITALS
SYSTOLIC BLOOD PRESSURE: 140 MMHG | WEIGHT: 229 LBS | BODY MASS INDEX: 32.78 KG/M2 | HEIGHT: 70 IN | OXYGEN SATURATION: 95 % | HEART RATE: 76 BPM | DIASTOLIC BLOOD PRESSURE: 80 MMHG

## 2017-09-05 DIAGNOSIS — J30.9 ALLERGIC RHINITIS, UNSPECIFIED: ICD-10-CM

## 2017-09-05 PROCEDURE — 99214 OFFICE O/P EST MOD 30 MIN: CPT

## 2017-09-05 RX ORDER — ALBUTEROL SULFATE 90 UG/1
108 (90 BASE) AEROSOL, METERED RESPIRATORY (INHALATION)
Qty: 1 | Refills: 3 | Status: ACTIVE | COMMUNITY
Start: 2017-09-05 | End: 1900-01-01

## 2017-10-18 ENCOUNTER — APPOINTMENT (OUTPATIENT)
Dept: RADIATION ONCOLOGY | Facility: CLINIC | Age: 77
End: 2017-10-18
Payer: MEDICARE

## 2017-10-18 VITALS — DIASTOLIC BLOOD PRESSURE: 70 MMHG | SYSTOLIC BLOOD PRESSURE: 120 MMHG

## 2017-10-18 VITALS
BODY MASS INDEX: 33.5 KG/M2 | WEIGHT: 234 LBS | RESPIRATION RATE: 16 BRPM | TEMPERATURE: 98 F | OXYGEN SATURATION: 95 % | HEART RATE: 81 BPM | HEIGHT: 70 IN | DIASTOLIC BLOOD PRESSURE: 98 MMHG | SYSTOLIC BLOOD PRESSURE: 184 MMHG

## 2017-10-18 DIAGNOSIS — Z85.118 PERSONAL HISTORY OF OTHER MALIGNANT NEOPLASM OF BRONCHUS AND LUNG: ICD-10-CM

## 2017-10-18 PROCEDURE — 99214 OFFICE O/P EST MOD 30 MIN: CPT

## 2017-11-08 ENCOUNTER — APPOINTMENT (OUTPATIENT)
Dept: NEPHROLOGY | Facility: CLINIC | Age: 77
End: 2017-11-08

## 2017-12-06 ENCOUNTER — FORM ENCOUNTER (OUTPATIENT)
Age: 77
End: 2017-12-06

## 2017-12-07 ENCOUNTER — OUTPATIENT (OUTPATIENT)
Dept: OUTPATIENT SERVICES | Facility: HOSPITAL | Age: 77
LOS: 1 days | End: 2017-12-07

## 2017-12-07 ENCOUNTER — APPOINTMENT (OUTPATIENT)
Dept: NUCLEAR MEDICINE | Facility: CLINIC | Age: 77
End: 2017-12-07
Payer: MEDICARE

## 2017-12-07 DIAGNOSIS — C34.91 MALIGNANT NEOPLASM OF UNSPECIFIED PART OF RIGHT BRONCHUS OR LUNG: ICD-10-CM

## 2017-12-07 DIAGNOSIS — C32.0 MALIGNANT NEOPLASM OF GLOTTIS: Chronic | ICD-10-CM

## 2017-12-07 DIAGNOSIS — Z90.49 ACQUIRED ABSENCE OF OTHER SPECIFIED PARTS OF DIGESTIVE TRACT: Chronic | ICD-10-CM

## 2017-12-07 DIAGNOSIS — Z96.659 PRESENCE OF UNSPECIFIED ARTIFICIAL KNEE JOINT: Chronic | ICD-10-CM

## 2017-12-07 PROCEDURE — 78815 PET IMAGE W/CT SKULL-THIGH: CPT | Mod: 26,PS

## 2017-12-18 ENCOUNTER — APPOINTMENT (OUTPATIENT)
Dept: THORACIC SURGERY | Facility: CLINIC | Age: 77
End: 2017-12-18
Payer: MEDICARE

## 2017-12-18 VITALS
WEIGHT: 230 LBS | HEART RATE: 80 BPM | RESPIRATION RATE: 16 BRPM | HEIGHT: 70 IN | SYSTOLIC BLOOD PRESSURE: 133 MMHG | OXYGEN SATURATION: 98 % | BODY MASS INDEX: 32.93 KG/M2 | DIASTOLIC BLOOD PRESSURE: 75 MMHG

## 2017-12-18 PROCEDURE — 99215 OFFICE O/P EST HI 40 MIN: CPT

## 2017-12-28 ENCOUNTER — OUTPATIENT (OUTPATIENT)
Dept: OUTPATIENT SERVICES | Facility: HOSPITAL | Age: 77
LOS: 1 days | End: 2017-12-28
Payer: MEDICARE

## 2017-12-28 VITALS
WEIGHT: 231.49 LBS | TEMPERATURE: 97 F | HEIGHT: 70 IN | RESPIRATION RATE: 16 BRPM | DIASTOLIC BLOOD PRESSURE: 78 MMHG | SYSTOLIC BLOOD PRESSURE: 132 MMHG | HEART RATE: 84 BPM

## 2017-12-28 DIAGNOSIS — Z01.811 ENCOUNTER FOR PREPROCEDURAL RESPIRATORY EXAMINATION: ICD-10-CM

## 2017-12-28 DIAGNOSIS — C32.0 MALIGNANT NEOPLASM OF GLOTTIS: Chronic | ICD-10-CM

## 2017-12-28 DIAGNOSIS — Z96.659 PRESENCE OF UNSPECIFIED ARTIFICIAL KNEE JOINT: Chronic | ICD-10-CM

## 2017-12-28 DIAGNOSIS — Z90.49 ACQUIRED ABSENCE OF OTHER SPECIFIED PARTS OF DIGESTIVE TRACT: Chronic | ICD-10-CM

## 2017-12-28 LAB
ANION GAP SERPL CALC-SCNC: 15 MMOL/L — SIGNIFICANT CHANGE UP (ref 5–17)
APTT BLD: 25.7 SEC — LOW (ref 27.5–37.4)
BUN SERPL-MCNC: 32 MG/DL — HIGH (ref 8–20)
CALCIUM SERPL-MCNC: 10.2 MG/DL — SIGNIFICANT CHANGE UP (ref 8.6–10.2)
CHLORIDE SERPL-SCNC: 99 MMOL/L — SIGNIFICANT CHANGE UP (ref 98–107)
CO2 SERPL-SCNC: 25 MMOL/L — SIGNIFICANT CHANGE UP (ref 22–29)
CREAT SERPL-MCNC: 1.75 MG/DL — HIGH (ref 0.5–1.3)
GLUCOSE SERPL-MCNC: 130 MG/DL — HIGH (ref 70–115)
HBA1C BLD-MCNC: 7.7 % — HIGH (ref 4–5.6)
HCT VFR BLD CALC: 40.3 % — LOW (ref 42–52)
HGB BLD-MCNC: 13.1 G/DL — LOW (ref 14–18)
INR BLD: 1.04 RATIO — SIGNIFICANT CHANGE UP (ref 0.88–1.16)
MCHC RBC-ENTMCNC: 30 PG — SIGNIFICANT CHANGE UP (ref 27–31)
MCHC RBC-ENTMCNC: 32.5 G/DL — SIGNIFICANT CHANGE UP (ref 32–36)
MCV RBC AUTO: 92.4 FL — SIGNIFICANT CHANGE UP (ref 80–94)
PLATELET # BLD AUTO: 181 K/UL — SIGNIFICANT CHANGE UP (ref 150–400)
POTASSIUM SERPL-MCNC: 5.1 MMOL/L — SIGNIFICANT CHANGE UP (ref 3.5–5.3)
POTASSIUM SERPL-SCNC: 5.1 MMOL/L — SIGNIFICANT CHANGE UP (ref 3.5–5.3)
PROTHROM AB SERPL-ACNC: 11.4 SEC — SIGNIFICANT CHANGE UP (ref 9.8–12.7)
RBC # BLD: 4.36 M/UL — LOW (ref 4.6–6.2)
RBC # FLD: 12.9 % — SIGNIFICANT CHANGE UP (ref 11–15.6)
SODIUM SERPL-SCNC: 139 MMOL/L — SIGNIFICANT CHANGE UP (ref 135–145)
WBC # BLD: 7.2 K/UL — SIGNIFICANT CHANGE UP (ref 4.8–10.8)
WBC # FLD AUTO: 7.2 K/UL — SIGNIFICANT CHANGE UP (ref 4.8–10.8)

## 2017-12-28 PROCEDURE — 93010 ELECTROCARDIOGRAM REPORT: CPT

## 2017-12-28 PROCEDURE — 93005 ELECTROCARDIOGRAM TRACING: CPT

## 2017-12-28 PROCEDURE — 80048 BASIC METABOLIC PNL TOTAL CA: CPT

## 2017-12-28 PROCEDURE — 36415 COLL VENOUS BLD VENIPUNCTURE: CPT

## 2017-12-28 PROCEDURE — 85730 THROMBOPLASTIN TIME PARTIAL: CPT

## 2017-12-28 PROCEDURE — 85027 COMPLETE CBC AUTOMATED: CPT

## 2017-12-28 PROCEDURE — 83036 HEMOGLOBIN GLYCOSYLATED A1C: CPT

## 2017-12-28 PROCEDURE — 85610 PROTHROMBIN TIME: CPT

## 2017-12-28 PROCEDURE — G0463: CPT

## 2017-12-28 RX ORDER — CEFAZOLIN SODIUM 1 G
2000 VIAL (EA) INJECTION ONCE
Qty: 0 | Refills: 0 | Status: DISCONTINUED | OUTPATIENT
Start: 2018-01-09 | End: 2018-01-24

## 2017-12-28 NOTE — ASU PATIENT PROFILE, ADULT - LEARNING ASSESSMENT (PATIENT) ADDITIONAL COMMENTS
Instructed pt on pre-op instructions, tips for safer surgery, pain management scale and verbalized understanding of all.

## 2017-12-28 NOTE — H&P PST ADULT - HISTORY OF PRESENT ILLNESS
76 year old male presents to [facility] with wife at side, pt anxious states he took a xanax prior to coming to [facility]. Pt had a right lung nodule noted on ct scan in FEB. 2016 with primary. Repeat CT scan in May 2016 had no change as per wife. 1 Year follow up Ct scan in MAY 2017 showed increase in size ,Pet scan done 5/22/17 suggested malignancy. Pt scheduled for biopsy  of right lung mass with DR. Gibbons in radiology. Cough over past 1 year , clear sputum, dyspnea with exertion, Started inhaler Ellipta 1 week ago but dc'd on 6/12/17 because pt developed nausea, hand tremors  and fever of 102 ( for 2 days) afebrile today. Nausea persists , vomited small amount of clear liquid in [facility]. Pt denies chest pain , pt states he is feeling better now since taking xanax this am. Pt states he has history of anxiety prior to procedures. 77 year old male presents to [facility] with wife at side,  Pt had a right lung nodule noted on ct scan in FEB. 2016 with primary. Repeat CT scan in May 2016 had no change as per wife. 1 Year follow up Ct scan in MAY 2017 showed increase in size ,Pet scan done 5/22/17 suggested malignancy. Pt scheduled for Flexible bronchoscopy and EBUS

## 2017-12-28 NOTE — H&P PST ADULT - PROBLEM SELECTOR PLAN 5
continue medications as instructed. finger stick on DOP . asked PCP about Lantus dose the day before surgery

## 2017-12-28 NOTE — ASU PATIENT PROFILE, ADULT - ABILITY TO HEAR (WITH HEARING AID OR HEARING APPLIANCE IF NORMALLY USED):
Mildly to Moderately Impaired: difficulty hearing in some environments or speaker may need to increase volume or speak distinctly/left ear diminished, not hearing aid

## 2017-12-28 NOTE — H&P PST ADULT - PSH
Cancer of vocal cord  1996 biopsy  S/P cholecystectomy    S/P knee replacement  bilateral Cancer of vocal cord  1996 biopsy  S/P cholecystectomy    S/P knee replacement  bilateral, 2011

## 2017-12-28 NOTE — ASU PATIENT PROFILE, ADULT - PMH
Anxiety  before procedures  Constricted pupils  for several years states he was seen by many eye doctors no cause known  Diabetes    ETOH abuse  last drink 28 yrs ago   AA meeting weekly  Fever and chills  102  6/12/17  and 6/13/17  no fever since then discontinued ellipta  High cholesterol    Hypertension    Hypothyroid    Lung nodule  right 2016   repeat ct scan 2017 increased  size  Sleep apnea  2012 done in [place] studies  [facility]p at home  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

## 2017-12-28 NOTE — H&P PST ADULT - PMH
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

## 2017-12-28 NOTE — ASU PATIENT PROFILE, ADULT - VISION (WITH CORRECTIVE LENSES IF THE PATIENT USUALLY WEARS THEM):
Partially impaired: cannot see medication labels or newsprint, but can see obstacles in path, and the surrounding layout; can count fingers at arm's length/glasses - progressive lens'

## 2018-01-09 ENCOUNTER — RESULT REVIEW (OUTPATIENT)
Age: 78
End: 2018-01-09

## 2018-01-09 ENCOUNTER — OUTPATIENT (OUTPATIENT)
Dept: OUTPATIENT SERVICES | Facility: HOSPITAL | Age: 78
LOS: 1 days | End: 2018-01-09
Payer: MEDICARE

## 2018-01-09 ENCOUNTER — TRANSCRIPTION ENCOUNTER (OUTPATIENT)
Age: 78
End: 2018-01-09

## 2018-01-09 ENCOUNTER — APPOINTMENT (OUTPATIENT)
Dept: THORACIC SURGERY | Facility: HOSPITAL | Age: 78
End: 2018-01-09

## 2018-01-09 VITALS — OXYGEN SATURATION: 98 % | TEMPERATURE: 97 F | RESPIRATION RATE: 16 BRPM | HEART RATE: 57 BPM

## 2018-01-09 VITALS
RESPIRATION RATE: 16 BRPM | WEIGHT: 231.49 LBS | DIASTOLIC BLOOD PRESSURE: 64 MMHG | SYSTOLIC BLOOD PRESSURE: 135 MMHG | OXYGEN SATURATION: 98 % | HEIGHT: 70 IN | TEMPERATURE: 97 F | HEART RATE: 79 BPM

## 2018-01-09 DIAGNOSIS — R59.0 LOCALIZED ENLARGED LYMPH NODES: ICD-10-CM

## 2018-01-09 DIAGNOSIS — C32.0 MALIGNANT NEOPLASM OF GLOTTIS: Chronic | ICD-10-CM

## 2018-01-09 DIAGNOSIS — Z96.659 PRESENCE OF UNSPECIFIED ARTIFICIAL KNEE JOINT: Chronic | ICD-10-CM

## 2018-01-09 DIAGNOSIS — Z90.49 ACQUIRED ABSENCE OF OTHER SPECIFIED PARTS OF DIGESTIVE TRACT: Chronic | ICD-10-CM

## 2018-01-09 LAB
GLUCOSE BLDC GLUCOMTR-MCNC: 137 MG/DL — HIGH (ref 70–99)
GLUCOSE BLDC GLUCOMTR-MCNC: 147 MG/DL — HIGH (ref 70–99)
GRAM STN FLD: SIGNIFICANT CHANGE UP
SPECIMEN SOURCE: SIGNIFICANT CHANGE UP

## 2018-01-09 PROCEDURE — 88172 CYTP DX EVAL FNA 1ST EA SITE: CPT

## 2018-01-09 PROCEDURE — 87116 MYCOBACTERIA CULTURE: CPT

## 2018-01-09 PROCEDURE — 88172 CYTP DX EVAL FNA 1ST EA SITE: CPT | Mod: 26

## 2018-01-09 PROCEDURE — 87070 CULTURE OTHR SPECIMN AEROBIC: CPT

## 2018-01-09 PROCEDURE — 88173 CYTOPATH EVAL FNA REPORT: CPT

## 2018-01-09 PROCEDURE — 82962 GLUCOSE BLOOD TEST: CPT

## 2018-01-09 PROCEDURE — 88173 CYTOPATH EVAL FNA REPORT: CPT | Mod: 26

## 2018-01-09 PROCEDURE — 87075 CULTR BACTERIA EXCEPT BLOOD: CPT

## 2018-01-09 PROCEDURE — 31653 BRONCH EBUS SAMPLNG 3/> NODE: CPT

## 2018-01-09 PROCEDURE — 31624 DX BRONCHOSCOPE/LAVAGE: CPT

## 2018-01-09 PROCEDURE — 87102 FUNGUS ISOLATION CULTURE: CPT

## 2018-01-09 PROCEDURE — 87015 SPECIMEN INFECT AGNT CONCNTJ: CPT

## 2018-01-09 PROCEDURE — 87206 SMEAR FLUORESCENT/ACID STAI: CPT

## 2018-01-09 RX ORDER — ACETAMINOPHEN 500 MG
1000 TABLET ORAL ONCE
Qty: 0 | Refills: 0 | Status: COMPLETED | OUTPATIENT
Start: 2018-01-09 | End: 2018-01-09

## 2018-01-09 RX ORDER — FENTANYL CITRATE 50 UG/ML
50 INJECTION INTRAVENOUS
Qty: 0 | Refills: 0 | Status: DISCONTINUED | OUTPATIENT
Start: 2018-01-09 | End: 2018-01-09

## 2018-01-09 RX ORDER — SODIUM CHLORIDE 9 MG/ML
1000 INJECTION, SOLUTION INTRAVENOUS
Qty: 0 | Refills: 0 | Status: DISCONTINUED | OUTPATIENT
Start: 2018-01-09 | End: 2018-01-09

## 2018-01-09 RX ORDER — ONDANSETRON 8 MG/1
4 TABLET, FILM COATED ORAL ONCE
Qty: 0 | Refills: 0 | Status: DISCONTINUED | OUTPATIENT
Start: 2018-01-09 | End: 2018-01-09

## 2018-01-09 RX ADMIN — Medication 400 MILLIGRAM(S): at 13:14

## 2018-01-09 RX ADMIN — FENTANYL CITRATE 50 MICROGRAM(S): 50 INJECTION INTRAVENOUS at 13:26

## 2018-01-09 NOTE — ASU DISCHARGE PLAN (ADULT/PEDIATRIC). - MEDICATION SUMMARY - MEDICATIONS TO TAKE
I will START or STAY ON the medications listed below when I get home from the hospital:    tamsulosin 0.4 mg oral capsule  -- 1 cap(s) by mouth once a day (at bedtime)  -- Indication: For Localized enlarged lymph nodes    Lantus 100 units/mL subcutaneous solution  -- 30 unit(s) subcutaneous once a day (at bedtime)  -- 30 u bedtime  -- Indication: For Localized enlarged lymph nodes    Claritin 24 Hour Allergy 10 mg oral tablet  -- 1 tab(s) by mouth once a day  -- Indication: For Localized enlarged lymph nodes    simvastatin 40 mg oral tablet  -- 1 tab(s) by mouth once a day (at bedtime)  -- Indication: For Localized enlarged lymph nodes    ALPRAZolam 0.5 mg oral tablet  -- 1 tab(s) by mouth every 8 hours, As needed, severe anxiety  -- Indication: For Localized enlarged lymph nodes    Ambien 5 mg oral tablet  -- 1 tab(s) by mouth once a day (at bedtime)  -- Indication: For Localized enlarged lymph nodes    Norvasc 5 mg oral tablet  -- 1 tab(s) by mouth once a day  -- It is very important that you take or use this exactly as directed.  Do not skip doses or discontinue unless directed by your doctor.  Some non-prescription drugs may aggravate your condition.  Read all labels carefully.  If a warning appears, check with your doctor before taking.    -- Indication: For Localized enlarged lymph nodes    Flonase 50 mcg/inh nasal spray  -- 1 spray(s) into nose once a day  -- Indication: For Localized enlarged lymph nodes    Synthroid 75 mcg (0.075 mg) oral tablet  -- 1 tab(s) by mouth once a day  -- Indication: For Localized enlarged lymph nodes    calcitriol 0.25 mcg oral capsule  -- 1 cap(s) by mouth once a day  -- Indication: For Localized enlarged lymph nodes    ergocalciferol 50,000 intl units oral tablet  -- Indication: For Localized enlarged lymph nodes

## 2018-01-10 ENCOUNTER — TRANSCRIPTION ENCOUNTER (OUTPATIENT)
Age: 78
End: 2018-01-10

## 2018-01-10 LAB
NIGHT BLUE STAIN TISS: SIGNIFICANT CHANGE UP
SPECIMEN SOURCE: SIGNIFICANT CHANGE UP

## 2018-01-14 LAB
CULTURE RESULTS: SIGNIFICANT CHANGE UP
SPECIMEN SOURCE: SIGNIFICANT CHANGE UP

## 2018-01-16 LAB — CYTOLOGY FNA REPORT: SIGNIFICANT CHANGE UP

## 2018-01-17 ENCOUNTER — APPOINTMENT (OUTPATIENT)
Dept: PULMONOLOGY | Facility: CLINIC | Age: 78
End: 2018-01-17
Payer: MEDICARE

## 2018-01-17 VITALS
WEIGHT: 227 LBS | DIASTOLIC BLOOD PRESSURE: 70 MMHG | BODY MASS INDEX: 32.5 KG/M2 | SYSTOLIC BLOOD PRESSURE: 142 MMHG | OXYGEN SATURATION: 93 % | HEART RATE: 74 BPM | HEIGHT: 70 IN

## 2018-01-17 DIAGNOSIS — J92.0 PLEURAL PLAQUE WITH PRESENCE OF ASBESTOS: ICD-10-CM

## 2018-01-17 PROCEDURE — 99215 OFFICE O/P EST HI 40 MIN: CPT

## 2018-01-22 ENCOUNTER — APPOINTMENT (OUTPATIENT)
Dept: THORACIC SURGERY | Facility: CLINIC | Age: 78
End: 2018-01-22
Payer: MEDICARE

## 2018-01-22 VITALS
OXYGEN SATURATION: 97 % | BODY MASS INDEX: 32.21 KG/M2 | RESPIRATION RATE: 16 BRPM | HEART RATE: 82 BPM | WEIGHT: 225 LBS | HEIGHT: 70 IN | SYSTOLIC BLOOD PRESSURE: 154 MMHG | DIASTOLIC BLOOD PRESSURE: 74 MMHG

## 2018-01-22 PROCEDURE — 99214 OFFICE O/P EST MOD 30 MIN: CPT

## 2018-01-25 ENCOUNTER — OTHER (OUTPATIENT)
Age: 78
End: 2018-01-25

## 2018-01-28 ENCOUNTER — FORM ENCOUNTER (OUTPATIENT)
Age: 78
End: 2018-01-28

## 2018-01-29 ENCOUNTER — OUTPATIENT (OUTPATIENT)
Dept: OUTPATIENT SERVICES | Facility: HOSPITAL | Age: 78
LOS: 1 days | End: 2018-01-29

## 2018-01-29 ENCOUNTER — APPOINTMENT (OUTPATIENT)
Dept: NUCLEAR MEDICINE | Facility: CLINIC | Age: 78
End: 2018-01-29
Payer: MEDICARE

## 2018-01-29 ENCOUNTER — OUTPATIENT (OUTPATIENT)
Dept: OUTPATIENT SERVICES | Facility: HOSPITAL | Age: 78
LOS: 1 days | Discharge: HOME | End: 2018-01-29
Payer: MEDICARE

## 2018-01-29 DIAGNOSIS — Z96.659 PRESENCE OF UNSPECIFIED ARTIFICIAL KNEE JOINT: Chronic | ICD-10-CM

## 2018-01-29 DIAGNOSIS — C34.90 MALIGNANT NEOPLASM OF UNSPECIFIED PART OF UNSPECIFIED BRONCHUS OR LUNG: ICD-10-CM

## 2018-01-29 DIAGNOSIS — C32.0 MALIGNANT NEOPLASM OF GLOTTIS: Chronic | ICD-10-CM

## 2018-01-29 DIAGNOSIS — Z90.49 ACQUIRED ABSENCE OF OTHER SPECIFIED PARTS OF DIGESTIVE TRACT: Chronic | ICD-10-CM

## 2018-01-29 DIAGNOSIS — Z00.8 ENCOUNTER FOR OTHER GENERAL EXAMINATION: ICD-10-CM

## 2018-01-29 PROCEDURE — 78306 BONE IMAGING WHOLE BODY: CPT | Mod: 26

## 2018-01-30 ENCOUNTER — OTHER (OUTPATIENT)
Age: 78
End: 2018-01-30

## 2018-01-31 ENCOUNTER — FORM ENCOUNTER (OUTPATIENT)
Age: 78
End: 2018-01-31

## 2018-02-01 ENCOUNTER — APPOINTMENT (OUTPATIENT)
Dept: HEMATOLOGY ONCOLOGY | Facility: CLINIC | Age: 78
End: 2018-02-01
Payer: MEDICARE

## 2018-02-01 ENCOUNTER — APPOINTMENT (OUTPATIENT)
Dept: RADIATION ONCOLOGY | Facility: CLINIC | Age: 78
End: 2018-02-01
Payer: MEDICARE

## 2018-02-01 ENCOUNTER — OUTPATIENT (OUTPATIENT)
Dept: OUTPATIENT SERVICES | Facility: HOSPITAL | Age: 78
LOS: 1 days | End: 2018-02-01
Payer: MEDICARE

## 2018-02-01 VITALS
HEIGHT: 69.49 IN | WEIGHT: 230.6 LBS | HEART RATE: 78 BPM | DIASTOLIC BLOOD PRESSURE: 84 MMHG | BODY MASS INDEX: 33.39 KG/M2 | OXYGEN SATURATION: 96 % | SYSTOLIC BLOOD PRESSURE: 156 MMHG | TEMPERATURE: 97.5 F

## 2018-02-01 VITALS
HEART RATE: 78 BPM | RESPIRATION RATE: 16 BRPM | TEMPERATURE: 97.6 F | DIASTOLIC BLOOD PRESSURE: 79 MMHG | OXYGEN SATURATION: 93 % | SYSTOLIC BLOOD PRESSURE: 138 MMHG | HEIGHT: 69 IN | BODY MASS INDEX: 34.36 KG/M2 | WEIGHT: 232 LBS

## 2018-02-01 DIAGNOSIS — Z92.3 PERSONAL HISTORY OF IRRADIATION: ICD-10-CM

## 2018-02-01 DIAGNOSIS — C32.0 MALIGNANT NEOPLASM OF GLOTTIS: Chronic | ICD-10-CM

## 2018-02-01 DIAGNOSIS — Z96.659 PRESENCE OF UNSPECIFIED ARTIFICIAL KNEE JOINT: Chronic | ICD-10-CM

## 2018-02-01 DIAGNOSIS — Z90.49 ACQUIRED ABSENCE OF OTHER SPECIFIED PARTS OF DIGESTIVE TRACT: Chronic | ICD-10-CM

## 2018-02-01 DIAGNOSIS — C34.90 MALIGNANT NEOPLASM OF UNSPECIFIED PART OF UNSPECIFIED BRONCHUS OR LUNG: ICD-10-CM

## 2018-02-01 PROCEDURE — 73060 X-RAY EXAM OF HUMERUS: CPT | Mod: 26,RT

## 2018-02-01 PROCEDURE — 99214 OFFICE O/P EST MOD 30 MIN: CPT

## 2018-02-01 PROCEDURE — 99204 OFFICE O/P NEW MOD 45 MIN: CPT

## 2018-02-02 PROBLEM — Z92.3 PERSONAL HISTORY OF RADIATION THERAPY: Status: RESOLVED | Noted: 2017-10-18 | Resolved: 2018-02-02

## 2018-02-05 ENCOUNTER — RESULT REVIEW (OUTPATIENT)
Age: 78
End: 2018-02-05

## 2018-02-05 LAB
CULTURE RESULTS: SIGNIFICANT CHANGE UP
PATH REPORT ADDENDUM.SYNOPTIC DOC: SIGNIFICANT CHANGE UP
SPECIMEN SOURCE: SIGNIFICANT CHANGE UP

## 2018-02-06 LAB — PATH REPORT ADDENDUM.SYNOPTIC DOC: SIGNIFICANT CHANGE UP

## 2018-02-08 LAB — SURGICAL PATHOLOGY STUDY: SIGNIFICANT CHANGE UP

## 2018-02-14 ENCOUNTER — APPOINTMENT (OUTPATIENT)
Dept: INFUSION THERAPY | Facility: CLINIC | Age: 78
End: 2018-02-14

## 2018-02-14 ENCOUNTER — APPOINTMENT (OUTPATIENT)
Dept: HEMATOLOGY ONCOLOGY | Facility: CLINIC | Age: 78
End: 2018-02-14
Payer: MEDICARE

## 2018-02-14 VITALS
TEMPERATURE: 97.6 F | BODY MASS INDEX: 34.27 KG/M2 | OXYGEN SATURATION: 95 % | WEIGHT: 232.01 LBS | HEART RATE: 74 BPM | SYSTOLIC BLOOD PRESSURE: 151 MMHG | DIASTOLIC BLOOD PRESSURE: 75 MMHG

## 2018-02-14 PROCEDURE — 99214 OFFICE O/P EST MOD 30 MIN: CPT

## 2018-02-15 ENCOUNTER — RESULT REVIEW (OUTPATIENT)
Age: 78
End: 2018-02-15

## 2018-02-15 ENCOUNTER — APPOINTMENT (OUTPATIENT)
Dept: HEMATOLOGY ONCOLOGY | Facility: CLINIC | Age: 78
End: 2018-02-15

## 2018-02-15 DIAGNOSIS — Z51.11 ENCOUNTER FOR ANTINEOPLASTIC CHEMOTHERAPY: ICD-10-CM

## 2018-02-15 LAB
BASOPHILS # BLD AUTO: 0 K/UL — SIGNIFICANT CHANGE UP (ref 0–0.2)
BASOPHILS NFR BLD AUTO: 0.7 % — SIGNIFICANT CHANGE UP (ref 0–2)
EOSINOPHIL # BLD AUTO: 0.2 K/UL — SIGNIFICANT CHANGE UP (ref 0–0.5)
EOSINOPHIL NFR BLD AUTO: 2.7 % — SIGNIFICANT CHANGE UP (ref 0–6)
HCT VFR BLD CALC: 41.2 % — SIGNIFICANT CHANGE UP (ref 39–50)
HGB BLD-MCNC: 13.7 G/DL — SIGNIFICANT CHANGE UP (ref 13–17)
LYMPHOCYTES # BLD AUTO: 1.7 K/UL — SIGNIFICANT CHANGE UP (ref 1–3.3)
LYMPHOCYTES # BLD AUTO: 26.5 % — SIGNIFICANT CHANGE UP (ref 13–44)
MCHC RBC-ENTMCNC: 30.6 PG — SIGNIFICANT CHANGE UP (ref 27–34)
MCHC RBC-ENTMCNC: 33.2 GM/DL — SIGNIFICANT CHANGE UP (ref 32–36)
MCV RBC AUTO: 92 FL — SIGNIFICANT CHANGE UP (ref 80–100)
MONOCYTES # BLD AUTO: 0.6 K/UL — SIGNIFICANT CHANGE UP (ref 0–0.9)
MONOCYTES NFR BLD AUTO: 9.6 % — SIGNIFICANT CHANGE UP (ref 2–14)
NEUTROPHILS # BLD AUTO: 3.8 K/UL — SIGNIFICANT CHANGE UP (ref 1.8–7.4)
NEUTROPHILS NFR BLD AUTO: 60.6 % — SIGNIFICANT CHANGE UP (ref 43–77)
PLATELET # BLD AUTO: 186 K/UL — SIGNIFICANT CHANGE UP (ref 150–400)
RBC # BLD: 4.48 M/UL — SIGNIFICANT CHANGE UP (ref 4.2–5.8)
RBC # FLD: 12.4 % — SIGNIFICANT CHANGE UP (ref 10.3–14.5)
WBC # BLD: 6.3 K/UL — SIGNIFICANT CHANGE UP (ref 3.8–10.5)
WBC # FLD AUTO: 6.3 K/UL — SIGNIFICANT CHANGE UP (ref 3.8–10.5)

## 2018-02-15 PROCEDURE — 93010 ELECTROCARDIOGRAM REPORT: CPT

## 2018-02-16 LAB
ALBUMIN SERPL ELPH-MCNC: 4.1 G/DL
ALP BLD-CCNC: 54 U/L
ALT SERPL-CCNC: 17 U/L
ANION GAP SERPL CALC-SCNC: 14 MMOL/L
AST SERPL-CCNC: 19 U/L
BILIRUB SERPL-MCNC: 0.8 MG/DL
BUN SERPL-MCNC: 26 MG/DL
CALCIUM SERPL-MCNC: 9.8 MG/DL
CHLORIDE SERPL-SCNC: 103 MMOL/L
CO2 SERPL-SCNC: 25 MMOL/L
CREAT SERPL-MCNC: 1.68 MG/DL
GLUCOSE SERPL-MCNC: 178 MG/DL
HBV E AG SER QL: NEGATIVE
HBV SURFACE AB SER QL: NONREACTIVE
HBV SURFACE AG SER QL: NONREACTIVE
HCV AB SER QL: NONREACTIVE
HCV S/CO RATIO: 0.1 S/CO
POTASSIUM SERPL-SCNC: 5 MMOL/L
PROT SERPL-MCNC: 7.1 G/DL
SODIUM SERPL-SCNC: 142 MMOL/L

## 2018-02-21 ENCOUNTER — RESULT REVIEW (OUTPATIENT)
Age: 78
End: 2018-02-21

## 2018-02-21 ENCOUNTER — APPOINTMENT (OUTPATIENT)
Dept: INFUSION THERAPY | Facility: CLINIC | Age: 78
End: 2018-02-21

## 2018-02-21 LAB
BASOPHILS # BLD AUTO: 0 K/UL — SIGNIFICANT CHANGE UP (ref 0–0.2)
BASOPHILS NFR BLD AUTO: 0.4 % — SIGNIFICANT CHANGE UP (ref 0–2)
EOSINOPHIL # BLD AUTO: 0 K/UL — SIGNIFICANT CHANGE UP (ref 0–0.5)
EOSINOPHIL NFR BLD AUTO: 0.3 % — SIGNIFICANT CHANGE UP (ref 0–6)
HCT VFR BLD CALC: 36.5 % — LOW (ref 39–50)
HGB BLD-MCNC: 12.8 G/DL — LOW (ref 13–17)
LYMPHOCYTES # BLD AUTO: 1.4 K/UL — SIGNIFICANT CHANGE UP (ref 1–3.3)
LYMPHOCYTES # BLD AUTO: 11.8 % — LOW (ref 13–44)
MCHC RBC-ENTMCNC: 31.7 PG — SIGNIFICANT CHANGE UP (ref 27–34)
MCHC RBC-ENTMCNC: 35 GM/DL — SIGNIFICANT CHANGE UP (ref 32–36)
MCV RBC AUTO: 90.6 FL — SIGNIFICANT CHANGE UP (ref 80–100)
MONOCYTES # BLD AUTO: 0.9 K/UL — SIGNIFICANT CHANGE UP (ref 0–0.9)
MONOCYTES NFR BLD AUTO: 7.8 % — SIGNIFICANT CHANGE UP (ref 2–14)
NEUTROPHILS # BLD AUTO: 9.4 K/UL — HIGH (ref 1.8–7.4)
NEUTROPHILS NFR BLD AUTO: 79.7 % — HIGH (ref 43–77)
PLATELET # BLD AUTO: 219 K/UL — SIGNIFICANT CHANGE UP (ref 150–400)
RBC # BLD: 4.03 M/UL — LOW (ref 4.2–5.8)
RBC # FLD: 12.3 % — SIGNIFICANT CHANGE UP (ref 10.3–14.5)
WBC # BLD: 11.8 K/UL — HIGH (ref 3.8–10.5)
WBC # FLD AUTO: 11.8 K/UL — HIGH (ref 3.8–10.5)

## 2018-02-22 DIAGNOSIS — C79.51 SECONDARY MALIGNANT NEOPLASM OF BONE: ICD-10-CM

## 2018-02-22 DIAGNOSIS — R11.2 NAUSEA WITH VOMITING, UNSPECIFIED: ICD-10-CM

## 2018-02-26 LAB — PATH REPORT ADDENDUM.SYNOPTIC DOC: SIGNIFICANT CHANGE UP

## 2018-03-01 ENCOUNTER — OUTPATIENT (OUTPATIENT)
Dept: OUTPATIENT SERVICES | Facility: HOSPITAL | Age: 78
LOS: 1 days | Discharge: HOME | End: 2018-03-01

## 2018-03-01 DIAGNOSIS — C32.0 MALIGNANT NEOPLASM OF GLOTTIS: Chronic | ICD-10-CM

## 2018-03-01 DIAGNOSIS — Z90.49 ACQUIRED ABSENCE OF OTHER SPECIFIED PARTS OF DIGESTIVE TRACT: Chronic | ICD-10-CM

## 2018-03-01 DIAGNOSIS — C34.90 MALIGNANT NEOPLASM OF UNSPECIFIED PART OF UNSPECIFIED BRONCHUS OR LUNG: ICD-10-CM

## 2018-03-01 DIAGNOSIS — Z96.659 PRESENCE OF UNSPECIFIED ARTIFICIAL KNEE JOINT: Chronic | ICD-10-CM

## 2018-03-01 DIAGNOSIS — C79.51 SECONDARY MALIGNANT NEOPLASM OF BONE: ICD-10-CM

## 2018-03-03 LAB
CULTURE RESULTS: SIGNIFICANT CHANGE UP
SPECIMEN SOURCE: SIGNIFICANT CHANGE UP

## 2018-03-07 ENCOUNTER — APPOINTMENT (OUTPATIENT)
Dept: HEMATOLOGY ONCOLOGY | Facility: CLINIC | Age: 78
End: 2018-03-07
Payer: MEDICARE

## 2018-03-07 ENCOUNTER — RESULT REVIEW (OUTPATIENT)
Age: 78
End: 2018-03-07

## 2018-03-07 VITALS
TEMPERATURE: 98.6 F | SYSTOLIC BLOOD PRESSURE: 145 MMHG | WEIGHT: 230.93 LBS | OXYGEN SATURATION: 97 % | DIASTOLIC BLOOD PRESSURE: 71 MMHG | BODY MASS INDEX: 34.1 KG/M2 | HEART RATE: 79 BPM

## 2018-03-07 DIAGNOSIS — D69.6 THROMBOCYTOPENIA, UNSPECIFIED: ICD-10-CM

## 2018-03-07 LAB
BASOPHILS # BLD AUTO: 0 K/UL — SIGNIFICANT CHANGE UP (ref 0–0.2)
BASOPHILS NFR BLD AUTO: 0.5 % — SIGNIFICANT CHANGE UP (ref 0–2)
EOSINOPHIL # BLD AUTO: 0.4 K/UL — SIGNIFICANT CHANGE UP (ref 0–0.5)
EOSINOPHIL NFR BLD AUTO: 8.1 % — HIGH (ref 0–6)
HCT VFR BLD CALC: 33.4 % — LOW (ref 39–50)
HGB BLD-MCNC: 11.4 G/DL — LOW (ref 13–17)
LYMPHOCYTES # BLD AUTO: 1.5 K/UL — SIGNIFICANT CHANGE UP (ref 1–3.3)
LYMPHOCYTES # BLD AUTO: 33.3 % — SIGNIFICANT CHANGE UP (ref 13–44)
MCHC RBC-ENTMCNC: 31.6 PG — SIGNIFICANT CHANGE UP (ref 27–34)
MCHC RBC-ENTMCNC: 34 GM/DL — SIGNIFICANT CHANGE UP (ref 32–36)
MCV RBC AUTO: 92.9 FL — SIGNIFICANT CHANGE UP (ref 80–100)
MONOCYTES # BLD AUTO: 0.3 K/UL — SIGNIFICANT CHANGE UP (ref 0–0.9)
MONOCYTES NFR BLD AUTO: 7.3 % — SIGNIFICANT CHANGE UP (ref 2–14)
NEUTROPHILS # BLD AUTO: 2.3 K/UL — SIGNIFICANT CHANGE UP (ref 1.8–7.4)
NEUTROPHILS NFR BLD AUTO: 50.7 % — SIGNIFICANT CHANGE UP (ref 43–77)
PLATELET # BLD AUTO: 68 K/UL — LOW (ref 150–400)
RBC # BLD: 3.6 M/UL — LOW (ref 4.2–5.8)
RBC # FLD: 12.2 % — SIGNIFICANT CHANGE UP (ref 10.3–14.5)
WBC # BLD: 4.6 K/UL — SIGNIFICANT CHANGE UP (ref 3.8–10.5)
WBC # FLD AUTO: 4.6 K/UL — SIGNIFICANT CHANGE UP (ref 3.8–10.5)

## 2018-03-07 PROCEDURE — 99214 OFFICE O/P EST MOD 30 MIN: CPT

## 2018-03-08 LAB
ALBUMIN SERPL ELPH-MCNC: 3.8 G/DL
ALP BLD-CCNC: 62 U/L
ALT SERPL-CCNC: 82 U/L
ANION GAP SERPL CALC-SCNC: 16 MMOL/L
AST SERPL-CCNC: 42 U/L
BILIRUB SERPL-MCNC: 0.7 MG/DL
BUN SERPL-MCNC: 25 MG/DL
CALCIUM SERPL-MCNC: 8.6 MG/DL
CHLORIDE SERPL-SCNC: 107 MMOL/L
CO2 SERPL-SCNC: 22 MMOL/L
CREAT SERPL-MCNC: 1.71 MG/DL
GLUCOSE SERPL-MCNC: 165 MG/DL
POTASSIUM SERPL-SCNC: 4.7 MMOL/L
PROT SERPL-MCNC: 6.8 G/DL
SODIUM SERPL-SCNC: 145 MMOL/L

## 2018-03-13 ENCOUNTER — OTHER (OUTPATIENT)
Age: 78
End: 2018-03-13

## 2018-03-14 ENCOUNTER — APPOINTMENT (OUTPATIENT)
Dept: INFUSION THERAPY | Facility: CLINIC | Age: 78
End: 2018-03-14

## 2018-03-14 ENCOUNTER — RESULT REVIEW (OUTPATIENT)
Age: 78
End: 2018-03-14

## 2018-03-14 LAB
BASOPHILS # BLD AUTO: 0 K/UL — SIGNIFICANT CHANGE UP (ref 0–0.2)
BASOPHILS NFR BLD AUTO: 0.5 % — SIGNIFICANT CHANGE UP (ref 0–2)
EOSINOPHIL # BLD AUTO: 0 K/UL — SIGNIFICANT CHANGE UP (ref 0–0.5)
EOSINOPHIL NFR BLD AUTO: 0.2 % — SIGNIFICANT CHANGE UP (ref 0–6)
HCT VFR BLD CALC: 32.3 % — LOW (ref 39–50)
HGB BLD-MCNC: 11.4 G/DL — LOW (ref 13–17)
LYMPHOCYTES # BLD AUTO: 1.4 K/UL — SIGNIFICANT CHANGE UP (ref 1–3.3)
LYMPHOCYTES # BLD AUTO: 19.3 % — SIGNIFICANT CHANGE UP (ref 13–44)
MCHC RBC-ENTMCNC: 32.1 PG — SIGNIFICANT CHANGE UP (ref 27–34)
MCHC RBC-ENTMCNC: 35.2 GM/DL — SIGNIFICANT CHANGE UP (ref 32–36)
MCV RBC AUTO: 91.2 FL — SIGNIFICANT CHANGE UP (ref 80–100)
MONOCYTES # BLD AUTO: 1.2 K/UL — HIGH (ref 0–0.9)
MONOCYTES NFR BLD AUTO: 16.6 % — HIGH (ref 2–14)
NEUTROPHILS # BLD AUTO: 4.7 K/UL — SIGNIFICANT CHANGE UP (ref 1.8–7.4)
NEUTROPHILS NFR BLD AUTO: 63.4 % — SIGNIFICANT CHANGE UP (ref 43–77)
PLATELET # BLD AUTO: 296 K/UL — SIGNIFICANT CHANGE UP (ref 150–400)
RBC # BLD: 3.54 M/UL — LOW (ref 4.2–5.8)
RBC # FLD: 14 % — SIGNIFICANT CHANGE UP (ref 10.3–14.5)
WBC # BLD: 7.5 K/UL — SIGNIFICANT CHANGE UP (ref 3.8–10.5)
WBC # FLD AUTO: 7.5 K/UL — SIGNIFICANT CHANGE UP (ref 3.8–10.5)

## 2018-03-15 DIAGNOSIS — R11.2 NAUSEA WITH VOMITING, UNSPECIFIED: ICD-10-CM

## 2018-03-15 DIAGNOSIS — Z51.11 ENCOUNTER FOR ANTINEOPLASTIC CHEMOTHERAPY: ICD-10-CM

## 2018-03-29 ENCOUNTER — APPOINTMENT (OUTPATIENT)
Dept: HEMATOLOGY ONCOLOGY | Facility: CLINIC | Age: 78
End: 2018-03-29
Payer: MEDICARE

## 2018-03-29 ENCOUNTER — RESULT REVIEW (OUTPATIENT)
Age: 78
End: 2018-03-29

## 2018-03-29 VITALS
SYSTOLIC BLOOD PRESSURE: 150 MMHG | TEMPERATURE: 98 F | HEART RATE: 76 BPM | WEIGHT: 227.61 LBS | OXYGEN SATURATION: 96 % | BODY MASS INDEX: 33.61 KG/M2 | DIASTOLIC BLOOD PRESSURE: 78 MMHG

## 2018-03-29 LAB
BASOPHILS # BLD AUTO: 0 K/UL — SIGNIFICANT CHANGE UP (ref 0–0.2)
BASOPHILS NFR BLD AUTO: 0.4 % — SIGNIFICANT CHANGE UP (ref 0–2)
EOSINOPHIL # BLD AUTO: 0.1 K/UL — SIGNIFICANT CHANGE UP (ref 0–0.5)
EOSINOPHIL NFR BLD AUTO: 1.6 % — SIGNIFICANT CHANGE UP (ref 0–6)
HCT VFR BLD CALC: 29.8 % — LOW (ref 39–50)
HGB BLD-MCNC: 10.4 G/DL — LOW (ref 13–17)
LYMPHOCYTES # BLD AUTO: 1.4 K/UL — SIGNIFICANT CHANGE UP (ref 1–3.3)
LYMPHOCYTES # BLD AUTO: 28.1 % — SIGNIFICANT CHANGE UP (ref 13–44)
MCHC RBC-ENTMCNC: 32 PG — SIGNIFICANT CHANGE UP (ref 27–34)
MCHC RBC-ENTMCNC: 34.8 GM/DL — SIGNIFICANT CHANGE UP (ref 32–36)
MCV RBC AUTO: 92 FL — SIGNIFICANT CHANGE UP (ref 80–100)
MONOCYTES # BLD AUTO: 0.6 K/UL — SIGNIFICANT CHANGE UP (ref 0–0.9)
MONOCYTES NFR BLD AUTO: 12.3 % — SIGNIFICANT CHANGE UP (ref 2–14)
NEUTROPHILS # BLD AUTO: 2.9 K/UL — SIGNIFICANT CHANGE UP (ref 1.8–7.4)
NEUTROPHILS NFR BLD AUTO: 57.6 % — SIGNIFICANT CHANGE UP (ref 43–77)
PLATELET # BLD AUTO: 103 K/UL — LOW (ref 150–400)
RBC # BLD: 3.24 M/UL — LOW (ref 4.2–5.8)
RBC # FLD: 13.4 % — SIGNIFICANT CHANGE UP (ref 10.3–14.5)
WBC # BLD: 5.1 K/UL — SIGNIFICANT CHANGE UP (ref 3.8–10.5)
WBC # FLD AUTO: 5.1 K/UL — SIGNIFICANT CHANGE UP (ref 3.8–10.5)

## 2018-03-29 PROCEDURE — 99214 OFFICE O/P EST MOD 30 MIN: CPT

## 2018-03-30 LAB
ALBUMIN SERPL ELPH-MCNC: 3.8 G/DL
ALP BLD-CCNC: 60 U/L
ALT SERPL-CCNC: 44 U/L
ANION GAP SERPL CALC-SCNC: 16 MMOL/L
AST SERPL-CCNC: 29 U/L
BILIRUB SERPL-MCNC: 0.5 MG/DL
BUN SERPL-MCNC: 26 MG/DL
CALCIUM SERPL-MCNC: 8.9 MG/DL
CHLORIDE SERPL-SCNC: 107 MMOL/L
CO2 SERPL-SCNC: 21 MMOL/L
CREAT SERPL-MCNC: 1.59 MG/DL
GLUCOSE SERPL-MCNC: 150 MG/DL
POTASSIUM SERPL-SCNC: 5 MMOL/L
PROT SERPL-MCNC: 6.4 G/DL
SODIUM SERPL-SCNC: 144 MMOL/L

## 2018-04-02 ENCOUNTER — OUTPATIENT (OUTPATIENT)
Dept: OUTPATIENT SERVICES | Facility: HOSPITAL | Age: 78
LOS: 1 days | Discharge: HOME | End: 2018-04-02

## 2018-04-02 DIAGNOSIS — C34.90 MALIGNANT NEOPLASM OF UNSPECIFIED PART OF UNSPECIFIED BRONCHUS OR LUNG: ICD-10-CM

## 2018-04-02 DIAGNOSIS — Z90.49 ACQUIRED ABSENCE OF OTHER SPECIFIED PARTS OF DIGESTIVE TRACT: Chronic | ICD-10-CM

## 2018-04-02 DIAGNOSIS — C32.0 MALIGNANT NEOPLASM OF GLOTTIS: Chronic | ICD-10-CM

## 2018-04-02 DIAGNOSIS — C79.51 SECONDARY MALIGNANT NEOPLASM OF BONE: ICD-10-CM

## 2018-04-02 DIAGNOSIS — Z96.659 PRESENCE OF UNSPECIFIED ARTIFICIAL KNEE JOINT: Chronic | ICD-10-CM

## 2018-04-04 ENCOUNTER — RESULT REVIEW (OUTPATIENT)
Age: 78
End: 2018-04-04

## 2018-04-04 ENCOUNTER — APPOINTMENT (OUTPATIENT)
Dept: INFUSION THERAPY | Facility: CLINIC | Age: 78
End: 2018-04-04

## 2018-04-04 LAB
BASOPHILS # BLD AUTO: 0 K/UL — SIGNIFICANT CHANGE UP (ref 0–0.2)
BASOPHILS NFR BLD AUTO: 0.6 % — SIGNIFICANT CHANGE UP (ref 0–2)
EOSINOPHIL # BLD AUTO: 0 K/UL — SIGNIFICANT CHANGE UP (ref 0–0.5)
EOSINOPHIL NFR BLD AUTO: 0.2 % — SIGNIFICANT CHANGE UP (ref 0–6)
HCT VFR BLD CALC: 29.9 % — LOW (ref 39–50)
HGB BLD-MCNC: 10.3 G/DL — LOW (ref 13–17)
LYMPHOCYTES # BLD AUTO: 1.1 K/UL — SIGNIFICANT CHANGE UP (ref 1–3.3)
LYMPHOCYTES # BLD AUTO: 14.2 % — SIGNIFICANT CHANGE UP (ref 13–44)
MCHC RBC-ENTMCNC: 32.1 PG — SIGNIFICANT CHANGE UP (ref 27–34)
MCHC RBC-ENTMCNC: 34.3 GM/DL — SIGNIFICANT CHANGE UP (ref 32–36)
MCV RBC AUTO: 93.6 FL — SIGNIFICANT CHANGE UP (ref 80–100)
MONOCYTES # BLD AUTO: 0.8 K/UL — SIGNIFICANT CHANGE UP (ref 0–0.9)
MONOCYTES NFR BLD AUTO: 10.2 % — SIGNIFICANT CHANGE UP (ref 2–14)
NEUTROPHILS # BLD AUTO: 5.9 K/UL — SIGNIFICANT CHANGE UP (ref 1.8–7.4)
NEUTROPHILS NFR BLD AUTO: 74.8 % — SIGNIFICANT CHANGE UP (ref 43–77)
PLATELET # BLD AUTO: 221 K/UL — SIGNIFICANT CHANGE UP (ref 150–400)
RBC # BLD: 3.2 M/UL — LOW (ref 4.2–5.8)
RBC # FLD: 15.6 % — HIGH (ref 10.3–14.5)
WBC # BLD: 7.9 K/UL — SIGNIFICANT CHANGE UP (ref 3.8–10.5)
WBC # FLD AUTO: 7.9 K/UL — SIGNIFICANT CHANGE UP (ref 3.8–10.5)

## 2018-04-05 DIAGNOSIS — Z51.11 ENCOUNTER FOR ANTINEOPLASTIC CHEMOTHERAPY: ICD-10-CM

## 2018-04-05 DIAGNOSIS — R11.2 NAUSEA WITH VOMITING, UNSPECIFIED: ICD-10-CM

## 2018-04-18 ENCOUNTER — APPOINTMENT (OUTPATIENT)
Dept: NUCLEAR MEDICINE | Facility: CLINIC | Age: 78
End: 2018-04-18
Payer: MEDICARE

## 2018-04-18 ENCOUNTER — OUTPATIENT (OUTPATIENT)
Dept: OUTPATIENT SERVICES | Facility: HOSPITAL | Age: 78
LOS: 1 days | End: 2018-04-18

## 2018-04-18 DIAGNOSIS — C32.0 MALIGNANT NEOPLASM OF GLOTTIS: Chronic | ICD-10-CM

## 2018-04-18 DIAGNOSIS — C34.91 MALIGNANT NEOPLASM OF UNSPECIFIED PART OF RIGHT BRONCHUS OR LUNG: ICD-10-CM

## 2018-04-18 DIAGNOSIS — Z90.49 ACQUIRED ABSENCE OF OTHER SPECIFIED PARTS OF DIGESTIVE TRACT: Chronic | ICD-10-CM

## 2018-04-18 DIAGNOSIS — Z96.659 PRESENCE OF UNSPECIFIED ARTIFICIAL KNEE JOINT: Chronic | ICD-10-CM

## 2018-04-18 PROCEDURE — 78815 PET IMAGE W/CT SKULL-THIGH: CPT | Mod: 26,PS

## 2018-04-26 ENCOUNTER — APPOINTMENT (OUTPATIENT)
Dept: HEMATOLOGY ONCOLOGY | Facility: CLINIC | Age: 78
End: 2018-04-26
Payer: MEDICARE

## 2018-04-26 VITALS
DIASTOLIC BLOOD PRESSURE: 76 MMHG | HEIGHT: 69 IN | TEMPERATURE: 98.1 F | OXYGEN SATURATION: 95 % | HEART RATE: 74 BPM | BODY MASS INDEX: 33.76 KG/M2 | SYSTOLIC BLOOD PRESSURE: 138 MMHG | WEIGHT: 227.95 LBS

## 2018-04-26 PROCEDURE — 99214 OFFICE O/P EST MOD 30 MIN: CPT

## 2018-05-01 ENCOUNTER — APPOINTMENT (OUTPATIENT)
Dept: INFUSION THERAPY | Facility: CLINIC | Age: 78
End: 2018-05-01

## 2018-05-01 ENCOUNTER — RESULT REVIEW (OUTPATIENT)
Age: 78
End: 2018-05-01

## 2018-05-01 LAB
BASOPHILS # BLD AUTO: 0 K/UL — SIGNIFICANT CHANGE UP (ref 0–0.2)
BASOPHILS NFR BLD AUTO: 0.5 % — SIGNIFICANT CHANGE UP (ref 0–2)
EOSINOPHIL # BLD AUTO: 0 K/UL — SIGNIFICANT CHANGE UP (ref 0–0.5)
EOSINOPHIL NFR BLD AUTO: 0.1 % — SIGNIFICANT CHANGE UP (ref 0–6)
HCT VFR BLD CALC: 28.7 % — LOW (ref 39–50)
HGB BLD-MCNC: 9.6 G/DL — LOW (ref 13–17)
LYMPHOCYTES # BLD AUTO: 1.1 K/UL — SIGNIFICANT CHANGE UP (ref 1–3.3)
LYMPHOCYTES # BLD AUTO: 11.7 % — LOW (ref 13–44)
MCHC RBC-ENTMCNC: 32.4 PG — SIGNIFICANT CHANGE UP (ref 27–34)
MCHC RBC-ENTMCNC: 33.6 GM/DL — SIGNIFICANT CHANGE UP (ref 32–36)
MCV RBC AUTO: 96.4 FL — SIGNIFICANT CHANGE UP (ref 80–100)
MONOCYTES # BLD AUTO: 1.1 K/UL — HIGH (ref 0–0.9)
MONOCYTES NFR BLD AUTO: 12.3 % — SIGNIFICANT CHANGE UP (ref 2–14)
NEUTROPHILS # BLD AUTO: 7 K/UL — SIGNIFICANT CHANGE UP (ref 1.8–7.4)
NEUTROPHILS NFR BLD AUTO: 75.5 % — SIGNIFICANT CHANGE UP (ref 43–77)
PLATELET # BLD AUTO: 189 K/UL — SIGNIFICANT CHANGE UP (ref 150–400)
RBC # BLD: 2.98 M/UL — LOW (ref 4.2–5.8)
RBC # FLD: 17.1 % — HIGH (ref 10.3–14.5)
WBC # BLD: 9.3 K/UL — SIGNIFICANT CHANGE UP (ref 3.8–10.5)
WBC # FLD AUTO: 9.3 K/UL — SIGNIFICANT CHANGE UP (ref 3.8–10.5)

## 2018-05-02 ENCOUNTER — APPOINTMENT (OUTPATIENT)
Dept: ORTHOPEDIC SURGERY | Facility: CLINIC | Age: 78
End: 2018-05-02
Payer: MEDICARE

## 2018-05-02 VITALS
SYSTOLIC BLOOD PRESSURE: 148 MMHG | DIASTOLIC BLOOD PRESSURE: 77 MMHG | WEIGHT: 225 LBS | HEART RATE: 70 BPM | HEIGHT: 70 IN | BODY MASS INDEX: 32.21 KG/M2

## 2018-05-02 PROCEDURE — 99203 OFFICE O/P NEW LOW 30 MIN: CPT

## 2018-05-14 ENCOUNTER — OUTPATIENT (OUTPATIENT)
Dept: OUTPATIENT SERVICES | Facility: HOSPITAL | Age: 78
LOS: 1 days | Discharge: HOME | End: 2018-05-14

## 2018-05-14 DIAGNOSIS — C32.0 MALIGNANT NEOPLASM OF GLOTTIS: Chronic | ICD-10-CM

## 2018-05-14 DIAGNOSIS — Z96.659 PRESENCE OF UNSPECIFIED ARTIFICIAL KNEE JOINT: Chronic | ICD-10-CM

## 2018-05-14 DIAGNOSIS — Z90.49 ACQUIRED ABSENCE OF OTHER SPECIFIED PARTS OF DIGESTIVE TRACT: Chronic | ICD-10-CM

## 2018-05-14 DIAGNOSIS — C34.90 MALIGNANT NEOPLASM OF UNSPECIFIED PART OF UNSPECIFIED BRONCHUS OR LUNG: ICD-10-CM

## 2018-05-17 ENCOUNTER — LABORATORY RESULT (OUTPATIENT)
Age: 78
End: 2018-05-17

## 2018-05-17 ENCOUNTER — APPOINTMENT (OUTPATIENT)
Dept: HEMATOLOGY ONCOLOGY | Facility: CLINIC | Age: 78
End: 2018-05-17
Payer: MEDICARE

## 2018-05-17 ENCOUNTER — RESULT REVIEW (OUTPATIENT)
Age: 78
End: 2018-05-17

## 2018-05-17 ENCOUNTER — APPOINTMENT (OUTPATIENT)
Dept: OPHTHALMOLOGY | Facility: CLINIC | Age: 78
End: 2018-05-17
Payer: MEDICARE

## 2018-05-17 VITALS
WEIGHT: 223.54 LBS | BODY MASS INDEX: 32 KG/M2 | SYSTOLIC BLOOD PRESSURE: 136 MMHG | TEMPERATURE: 97.5 F | HEIGHT: 70 IN | HEART RATE: 80 BPM | DIASTOLIC BLOOD PRESSURE: 77 MMHG | OXYGEN SATURATION: 98 %

## 2018-05-17 LAB
ALBUMIN SERPL ELPH-MCNC: 3.9 G/DL
ALP BLD-CCNC: 59 U/L
ALT SERPL-CCNC: 17 U/L
ANION GAP SERPL CALC-SCNC: 15 MMOL/L
AST SERPL-CCNC: 20 U/L
BASOPHILS # BLD AUTO: 0 K/UL — SIGNIFICANT CHANGE UP (ref 0–0.2)
BASOPHILS NFR BLD AUTO: 0.5 % — SIGNIFICANT CHANGE UP (ref 0–2)
BILIRUB SERPL-MCNC: 0.7 MG/DL
BUN SERPL-MCNC: 27 MG/DL
CALCIUM SERPL-MCNC: 9 MG/DL
CHLORIDE SERPL-SCNC: 110 MMOL/L
CO2 SERPL-SCNC: 20 MMOL/L
CREAT SERPL-MCNC: 1.94 MG/DL
EOSINOPHIL # BLD AUTO: 0.1 K/UL — SIGNIFICANT CHANGE UP (ref 0–0.5)
EOSINOPHIL NFR BLD AUTO: 2 % — SIGNIFICANT CHANGE UP (ref 0–6)
GLUCOSE SERPL-MCNC: 89 MG/DL
HCT VFR BLD CALC: 23.9 % — LOW (ref 39–50)
HGB BLD-MCNC: 8.1 G/DL — LOW (ref 13–17)
LYMPHOCYTES # BLD AUTO: 1.4 K/UL — SIGNIFICANT CHANGE UP (ref 1–3.3)
LYMPHOCYTES # BLD AUTO: 35.1 % — SIGNIFICANT CHANGE UP (ref 13–44)
MCHC RBC-ENTMCNC: 32.3 PG — SIGNIFICANT CHANGE UP (ref 27–34)
MCHC RBC-ENTMCNC: 33.7 GM/DL — SIGNIFICANT CHANGE UP (ref 32–36)
MCV RBC AUTO: 95.7 FL — SIGNIFICANT CHANGE UP (ref 80–100)
MONOCYTES # BLD AUTO: 0.5 K/UL — SIGNIFICANT CHANGE UP (ref 0–0.9)
MONOCYTES NFR BLD AUTO: 12 % — SIGNIFICANT CHANGE UP (ref 2–14)
NEUTROPHILS # BLD AUTO: 2 K/UL — SIGNIFICANT CHANGE UP (ref 1.8–7.4)
NEUTROPHILS NFR BLD AUTO: 50.4 % — SIGNIFICANT CHANGE UP (ref 43–77)
PLATELET # BLD AUTO: 67 K/UL — LOW (ref 150–400)
POTASSIUM SERPL-SCNC: 4.8 MMOL/L
PROT SERPL-MCNC: 6.8 G/DL
RBC # BLD: 2.5 M/UL — LOW (ref 4.2–5.8)
RBC # FLD: 15.2 % — HIGH (ref 10.3–14.5)
SODIUM SERPL-SCNC: 145 MMOL/L
WBC # BLD: 3.9 K/UL — SIGNIFICANT CHANGE UP (ref 3.8–10.5)
WBC # FLD AUTO: 3.9 K/UL — SIGNIFICANT CHANGE UP (ref 3.8–10.5)

## 2018-05-17 PROCEDURE — 92134 CPTRZ OPH DX IMG PST SGM RTA: CPT

## 2018-05-17 PROCEDURE — 92004 COMPRE OPH EXAM NEW PT 1/>: CPT

## 2018-05-17 PROCEDURE — 99214 OFFICE O/P EST MOD 30 MIN: CPT

## 2018-05-23 ENCOUNTER — RESULT REVIEW (OUTPATIENT)
Age: 78
End: 2018-05-23

## 2018-05-23 ENCOUNTER — APPOINTMENT (OUTPATIENT)
Dept: INFUSION THERAPY | Facility: CLINIC | Age: 78
End: 2018-05-23

## 2018-05-23 ENCOUNTER — LABORATORY RESULT (OUTPATIENT)
Age: 78
End: 2018-05-23

## 2018-05-23 LAB
BASOPHILS # BLD AUTO: 0 K/UL — SIGNIFICANT CHANGE UP (ref 0–0.2)
BASOPHILS NFR BLD AUTO: 1.2 % — SIGNIFICANT CHANGE UP (ref 0–2)
EOSINOPHIL # BLD AUTO: 0.1 K/UL — SIGNIFICANT CHANGE UP (ref 0–0.5)
EOSINOPHIL NFR BLD AUTO: 1.8 % — SIGNIFICANT CHANGE UP (ref 0–6)
HCT VFR BLD CALC: 26.2 % — LOW (ref 39–50)
HGB BLD-MCNC: 8.7 G/DL — LOW (ref 13–17)
LYMPHOCYTES # BLD AUTO: 1.3 K/UL — SIGNIFICANT CHANGE UP (ref 1–3.3)
LYMPHOCYTES # BLD AUTO: 34.8 % — SIGNIFICANT CHANGE UP (ref 13–44)
MCHC RBC-ENTMCNC: 33 PG — SIGNIFICANT CHANGE UP (ref 27–34)
MCHC RBC-ENTMCNC: 33.4 GM/DL — SIGNIFICANT CHANGE UP (ref 32–36)
MCV RBC AUTO: 98.6 FL — SIGNIFICANT CHANGE UP (ref 80–100)
MONOCYTES # BLD AUTO: 0.6 K/UL — SIGNIFICANT CHANGE UP (ref 0–0.9)
MONOCYTES NFR BLD AUTO: 15.4 % — HIGH (ref 2–14)
NEUTROPHILS # BLD AUTO: 1.7 K/UL — LOW (ref 1.8–7.4)
NEUTROPHILS NFR BLD AUTO: 46.8 % — SIGNIFICANT CHANGE UP (ref 43–77)
PLATELET # BLD AUTO: 186 K/UL — SIGNIFICANT CHANGE UP (ref 150–400)
RBC # BLD: 2.65 M/UL — LOW (ref 4.2–5.8)
RBC # FLD: 17.6 % — HIGH (ref 10.3–14.5)
WBC # BLD: 3.7 K/UL — LOW (ref 3.8–10.5)
WBC # FLD AUTO: 3.7 K/UL — LOW (ref 3.8–10.5)

## 2018-05-24 DIAGNOSIS — N18.3 CHRONIC KIDNEY DISEASE, STAGE 3 (MODERATE): ICD-10-CM

## 2018-05-24 DIAGNOSIS — Z51.11 ENCOUNTER FOR ANTINEOPLASTIC CHEMOTHERAPY: ICD-10-CM

## 2018-05-24 DIAGNOSIS — D63.1 ANEMIA IN CHRONIC KIDNEY DISEASE: ICD-10-CM

## 2018-05-24 DIAGNOSIS — C79.51 SECONDARY MALIGNANT NEOPLASM OF BONE: ICD-10-CM

## 2018-06-06 ENCOUNTER — LABORATORY RESULT (OUTPATIENT)
Age: 78
End: 2018-06-06

## 2018-06-06 ENCOUNTER — RESULT REVIEW (OUTPATIENT)
Age: 78
End: 2018-06-06

## 2018-06-06 ENCOUNTER — APPOINTMENT (OUTPATIENT)
Dept: INFUSION THERAPY | Facility: CLINIC | Age: 78
End: 2018-06-06

## 2018-06-06 LAB
BASOPHILS # BLD AUTO: 0.1 K/UL — SIGNIFICANT CHANGE UP (ref 0–0.2)
BASOPHILS NFR BLD AUTO: 1 % — SIGNIFICANT CHANGE UP (ref 0–2)
EOSINOPHIL # BLD AUTO: 0.1 K/UL — SIGNIFICANT CHANGE UP (ref 0–0.5)
EOSINOPHIL NFR BLD AUTO: 1.2 % — SIGNIFICANT CHANGE UP (ref 0–6)
HCT VFR BLD CALC: 32.3 % — LOW (ref 39–50)
HGB BLD-MCNC: 10.5 G/DL — LOW (ref 13–17)
LYMPHOCYTES # BLD AUTO: 1.1 K/UL — SIGNIFICANT CHANGE UP (ref 1–3.3)
LYMPHOCYTES # BLD AUTO: 18 % — SIGNIFICANT CHANGE UP (ref 13–44)
MCHC RBC-ENTMCNC: 32.5 PG — SIGNIFICANT CHANGE UP (ref 27–34)
MCHC RBC-ENTMCNC: 32.6 GM/DL — SIGNIFICANT CHANGE UP (ref 32–36)
MCV RBC AUTO: 99.7 FL — SIGNIFICANT CHANGE UP (ref 80–100)
MONOCYTES # BLD AUTO: 0.7 K/UL — SIGNIFICANT CHANGE UP (ref 0–0.9)
MONOCYTES NFR BLD AUTO: 10.8 % — SIGNIFICANT CHANGE UP (ref 2–14)
NEUTROPHILS # BLD AUTO: 4.4 K/UL — SIGNIFICANT CHANGE UP (ref 1.8–7.4)
NEUTROPHILS NFR BLD AUTO: 69.1 % — SIGNIFICANT CHANGE UP (ref 43–77)
PLATELET # BLD AUTO: 217 K/UL — SIGNIFICANT CHANGE UP (ref 150–400)
RBC # BLD: 3.24 M/UL — LOW (ref 4.2–5.8)
RBC # FLD: 16.3 % — HIGH (ref 10.3–14.5)
WBC # BLD: 6.4 K/UL — SIGNIFICANT CHANGE UP (ref 3.8–10.5)
WBC # FLD AUTO: 6.4 K/UL — SIGNIFICANT CHANGE UP (ref 3.8–10.5)

## 2018-06-08 ENCOUNTER — APPOINTMENT (OUTPATIENT)
Dept: HEMATOLOGY ONCOLOGY | Facility: CLINIC | Age: 78
End: 2018-06-08

## 2018-06-19 ENCOUNTER — OUTPATIENT (OUTPATIENT)
Dept: OUTPATIENT SERVICES | Facility: HOSPITAL | Age: 78
LOS: 1 days | Discharge: HOME | End: 2018-06-19

## 2018-06-19 DIAGNOSIS — C34.90 MALIGNANT NEOPLASM OF UNSPECIFIED PART OF UNSPECIFIED BRONCHUS OR LUNG: ICD-10-CM

## 2018-06-19 DIAGNOSIS — Z96.659 PRESENCE OF UNSPECIFIED ARTIFICIAL KNEE JOINT: Chronic | ICD-10-CM

## 2018-06-19 DIAGNOSIS — C32.0 MALIGNANT NEOPLASM OF GLOTTIS: Chronic | ICD-10-CM

## 2018-06-19 DIAGNOSIS — Z90.49 ACQUIRED ABSENCE OF OTHER SPECIFIED PARTS OF DIGESTIVE TRACT: Chronic | ICD-10-CM

## 2018-06-20 ENCOUNTER — APPOINTMENT (OUTPATIENT)
Dept: INFUSION THERAPY | Facility: CLINIC | Age: 78
End: 2018-06-20

## 2018-06-21 DIAGNOSIS — Z51.11 ENCOUNTER FOR ANTINEOPLASTIC CHEMOTHERAPY: ICD-10-CM

## 2018-07-05 ENCOUNTER — APPOINTMENT (OUTPATIENT)
Dept: INFUSION THERAPY | Facility: CLINIC | Age: 78
End: 2018-07-05

## 2018-07-05 ENCOUNTER — APPOINTMENT (OUTPATIENT)
Dept: HEMATOLOGY ONCOLOGY | Facility: CLINIC | Age: 78
End: 2018-07-05
Payer: MEDICARE

## 2018-07-05 ENCOUNTER — LABORATORY RESULT (OUTPATIENT)
Age: 78
End: 2018-07-05

## 2018-07-05 ENCOUNTER — RESULT REVIEW (OUTPATIENT)
Age: 78
End: 2018-07-05

## 2018-07-05 LAB
BASOPHILS # BLD AUTO: 0 K/UL — SIGNIFICANT CHANGE UP (ref 0–0.2)
BASOPHILS NFR BLD AUTO: 0.6 % — SIGNIFICANT CHANGE UP (ref 0–2)
EOSINOPHIL # BLD AUTO: 0.1 K/UL — SIGNIFICANT CHANGE UP (ref 0–0.5)
EOSINOPHIL NFR BLD AUTO: 2.1 % — SIGNIFICANT CHANGE UP (ref 0–6)
HCT VFR BLD CALC: 33 % — LOW (ref 39–50)
HGB BLD-MCNC: 10.7 G/DL — LOW (ref 13–17)
LYMPHOCYTES # BLD AUTO: 1 K/UL — SIGNIFICANT CHANGE UP (ref 1–3.3)
LYMPHOCYTES # BLD AUTO: 15.9 % — SIGNIFICANT CHANGE UP (ref 13–44)
MCHC RBC-ENTMCNC: 30.2 PG — SIGNIFICANT CHANGE UP (ref 27–34)
MCHC RBC-ENTMCNC: 32.4 GM/DL — SIGNIFICANT CHANGE UP (ref 32–36)
MCV RBC AUTO: 93.3 FL — SIGNIFICANT CHANGE UP (ref 80–100)
MONOCYTES # BLD AUTO: 0.6 K/UL — SIGNIFICANT CHANGE UP (ref 0–0.9)
MONOCYTES NFR BLD AUTO: 9.7 % — SIGNIFICANT CHANGE UP (ref 2–14)
NEUTROPHILS # BLD AUTO: 4.6 K/UL — SIGNIFICANT CHANGE UP (ref 1.8–7.4)
NEUTROPHILS NFR BLD AUTO: 71.7 % — SIGNIFICANT CHANGE UP (ref 43–77)
PLATELET # BLD AUTO: 191 K/UL — SIGNIFICANT CHANGE UP (ref 150–400)
RBC # BLD: 3.53 M/UL — LOW (ref 4.2–5.8)
RBC # FLD: 15.2 % — HIGH (ref 10.3–14.5)
WBC # BLD: 6.4 K/UL — SIGNIFICANT CHANGE UP (ref 3.8–10.5)
WBC # FLD AUTO: 6.4 K/UL — SIGNIFICANT CHANGE UP (ref 3.8–10.5)

## 2018-07-05 PROCEDURE — 99213 OFFICE O/P EST LOW 20 MIN: CPT

## 2018-07-06 ENCOUNTER — APPOINTMENT (OUTPATIENT)
Dept: PULMONOLOGY | Facility: CLINIC | Age: 78
End: 2018-07-06
Payer: MEDICARE

## 2018-07-06 VITALS
DIASTOLIC BLOOD PRESSURE: 64 MMHG | OXYGEN SATURATION: 94 % | BODY MASS INDEX: 30.78 KG/M2 | WEIGHT: 215 LBS | HEART RATE: 77 BPM | SYSTOLIC BLOOD PRESSURE: 130 MMHG | HEIGHT: 70 IN

## 2018-07-06 DIAGNOSIS — C79.51 SECONDARY MALIGNANT NEOPLASM OF BONE: ICD-10-CM

## 2018-07-06 PROCEDURE — 99215 OFFICE O/P EST HI 40 MIN: CPT

## 2018-07-06 RX ORDER — AMLODIPINE BESYLATE 5 MG/1
5 TABLET ORAL DAILY
Qty: 90 | Refills: 1 | Status: ACTIVE | COMMUNITY
Start: 2017-06-19

## 2018-07-06 RX ORDER — PROCHLORPERAZINE MALEATE 10 MG/1
10 TABLET ORAL EVERY 6 HOURS
Qty: 28 | Refills: 2 | Status: ACTIVE | COMMUNITY
Start: 2018-02-14

## 2018-07-10 ENCOUNTER — APPOINTMENT (OUTPATIENT)
Dept: ORTHOPEDIC SURGERY | Facility: CLINIC | Age: 78
End: 2018-07-10
Payer: MEDICARE

## 2018-07-10 PROCEDURE — 99213 OFFICE O/P EST LOW 20 MIN: CPT

## 2018-07-10 PROCEDURE — 73060 X-RAY EXAM OF HUMERUS: CPT | Mod: RT

## 2018-07-16 PROBLEM — R11.0 NAUSEA: Chronic | Status: INACTIVE | Noted: 2017-06-14 | Resolved: 2017-12-28

## 2018-07-16 PROBLEM — R06.89 OTHER ABNORMALITIES OF BREATHING: Chronic | Status: INACTIVE | Noted: 2017-06-14 | Resolved: 2017-12-28

## 2018-07-16 PROBLEM — F41.9 ANXIETY DISORDER, UNSPECIFIED: Chronic | Status: ACTIVE | Noted: 2017-06-14

## 2018-07-16 PROBLEM — R05 COUGH: Chronic | Status: INACTIVE | Noted: 2017-06-14 | Resolved: 2017-12-28

## 2018-07-17 PROBLEM — F10.10 ALCOHOL ABUSE, UNCOMPLICATED: Chronic | Status: ACTIVE | Noted: 2017-06-14

## 2018-07-17 PROBLEM — C32.0 MALIGNANT NEOPLASM OF GLOTTIS: Chronic | Status: ACTIVE | Noted: 2017-06-14

## 2018-07-17 PROBLEM — E03.9 HYPOTHYROIDISM, UNSPECIFIED: Chronic | Status: ACTIVE | Noted: 2017-06-14

## 2018-07-17 PROBLEM — E78.00 PURE HYPERCHOLESTEROLEMIA, UNSPECIFIED: Chronic | Status: ACTIVE | Noted: 2017-06-14

## 2018-07-17 PROBLEM — I10 ESSENTIAL (PRIMARY) HYPERTENSION: Chronic | Status: ACTIVE | Noted: 2017-06-14

## 2018-07-17 PROBLEM — H57.03 MIOSIS: Chronic | Status: ACTIVE | Noted: 2017-06-14

## 2018-07-18 ENCOUNTER — APPOINTMENT (OUTPATIENT)
Dept: INFUSION THERAPY | Facility: CLINIC | Age: 78
End: 2018-07-18

## 2018-07-19 ENCOUNTER — OUTPATIENT (OUTPATIENT)
Dept: OUTPATIENT SERVICES | Facility: HOSPITAL | Age: 78
LOS: 1 days | End: 2018-07-19
Payer: MEDICARE

## 2018-07-19 ENCOUNTER — OUTPATIENT (OUTPATIENT)
Dept: OUTPATIENT SERVICES | Facility: HOSPITAL | Age: 78
LOS: 1 days | Discharge: HOME | End: 2018-07-19

## 2018-07-19 DIAGNOSIS — N18.3 CHRONIC KIDNEY DISEASE, STAGE 3 (MODERATE): ICD-10-CM

## 2018-07-19 DIAGNOSIS — Z96.659 PRESENCE OF UNSPECIFIED ARTIFICIAL KNEE JOINT: Chronic | ICD-10-CM

## 2018-07-19 DIAGNOSIS — C79.51 SECONDARY MALIGNANT NEOPLASM OF BONE: ICD-10-CM

## 2018-07-19 DIAGNOSIS — C34.90 MALIGNANT NEOPLASM OF UNSPECIFIED PART OF UNSPECIFIED BRONCHUS OR LUNG: ICD-10-CM

## 2018-07-19 DIAGNOSIS — C32.0 MALIGNANT NEOPLASM OF GLOTTIS: Chronic | ICD-10-CM

## 2018-07-19 DIAGNOSIS — G47.33 OBSTRUCTIVE SLEEP APNEA (ADULT) (PEDIATRIC): ICD-10-CM

## 2018-07-19 DIAGNOSIS — Z90.49 ACQUIRED ABSENCE OF OTHER SPECIFIED PARTS OF DIGESTIVE TRACT: Chronic | ICD-10-CM

## 2018-07-19 DIAGNOSIS — D63.1 ANEMIA IN CHRONIC KIDNEY DISEASE: ICD-10-CM

## 2018-07-19 PROCEDURE — G0399: CPT

## 2018-07-19 PROCEDURE — 95806 SLEEP STUDY UNATT&RESP EFFT: CPT

## 2018-07-19 PROCEDURE — 95806 SLEEP STUDY UNATT&RESP EFFT: CPT | Mod: 26

## 2018-07-25 ENCOUNTER — RESULT REVIEW (OUTPATIENT)
Age: 78
End: 2018-07-25

## 2018-07-25 ENCOUNTER — APPOINTMENT (OUTPATIENT)
Dept: HEMATOLOGY ONCOLOGY | Facility: CLINIC | Age: 78
End: 2018-07-25
Payer: MEDICARE

## 2018-07-25 VITALS
DIASTOLIC BLOOD PRESSURE: 72 MMHG | HEIGHT: 70 IN | BODY MASS INDEX: 30.54 KG/M2 | HEART RATE: 88 BPM | OXYGEN SATURATION: 94 % | SYSTOLIC BLOOD PRESSURE: 138 MMHG | WEIGHT: 213.29 LBS | TEMPERATURE: 98.4 F

## 2018-07-25 LAB
BASOPHILS # BLD AUTO: 0 K/UL — SIGNIFICANT CHANGE UP (ref 0–0.2)
BASOPHILS NFR BLD AUTO: 0.2 % — SIGNIFICANT CHANGE UP (ref 0–2)
EOSINOPHIL # BLD AUTO: 0.1 K/UL — SIGNIFICANT CHANGE UP (ref 0–0.5)
EOSINOPHIL NFR BLD AUTO: 1.5 % — SIGNIFICANT CHANGE UP (ref 0–6)
HCT VFR BLD CALC: 34 % — LOW (ref 39–50)
HGB BLD-MCNC: 10.9 G/DL — LOW (ref 13–17)
LYMPHOCYTES # BLD AUTO: 1.6 K/UL — SIGNIFICANT CHANGE UP (ref 1–3.3)
LYMPHOCYTES # BLD AUTO: 23.3 % — SIGNIFICANT CHANGE UP (ref 13–44)
MCHC RBC-ENTMCNC: 29.4 PG — SIGNIFICANT CHANGE UP (ref 27–34)
MCHC RBC-ENTMCNC: 32 GM/DL — SIGNIFICANT CHANGE UP (ref 32–36)
MCV RBC AUTO: 92.1 FL — SIGNIFICANT CHANGE UP (ref 80–100)
MONOCYTES # BLD AUTO: 0.8 K/UL — SIGNIFICANT CHANGE UP (ref 0–0.9)
MONOCYTES NFR BLD AUTO: 12 % — SIGNIFICANT CHANGE UP (ref 2–14)
NEUTROPHILS # BLD AUTO: 4.3 K/UL — SIGNIFICANT CHANGE UP (ref 1.8–7.4)
NEUTROPHILS NFR BLD AUTO: 63.1 % — SIGNIFICANT CHANGE UP (ref 43–77)
PLATELET # BLD AUTO: 210 K/UL — SIGNIFICANT CHANGE UP (ref 150–400)
RBC # BLD: 3.69 M/UL — LOW (ref 4.2–5.8)
RBC # FLD: 15.8 % — HIGH (ref 10.3–14.5)
WBC # BLD: 6.9 K/UL — SIGNIFICANT CHANGE UP (ref 3.8–10.5)
WBC # FLD AUTO: 6.9 K/UL — SIGNIFICANT CHANGE UP (ref 3.8–10.5)

## 2018-07-25 PROCEDURE — 99214 OFFICE O/P EST MOD 30 MIN: CPT

## 2018-07-26 LAB
ALBUMIN SERPL ELPH-MCNC: 3.8 G/DL
ALP BLD-CCNC: 70 U/L
ALT SERPL-CCNC: 13 U/L
ANION GAP SERPL CALC-SCNC: 15 MMOL/L
AST SERPL-CCNC: 18 U/L
BILIRUB SERPL-MCNC: 0.6 MG/DL
BUN SERPL-MCNC: 25 MG/DL
CALCIUM SERPL-MCNC: 8.8 MG/DL
CHLORIDE SERPL-SCNC: 102 MMOL/L
CO2 SERPL-SCNC: 23 MMOL/L
CREAT SERPL-MCNC: 1.72 MG/DL
GLUCOSE SERPL-MCNC: 142 MG/DL
POTASSIUM SERPL-SCNC: 4.6 MMOL/L
PROT SERPL-MCNC: 7 G/DL
SODIUM SERPL-SCNC: 140 MMOL/L

## 2018-07-30 ENCOUNTER — APPOINTMENT (OUTPATIENT)
Dept: NUCLEAR MEDICINE | Facility: CLINIC | Age: 78
End: 2018-07-30
Payer: MEDICARE

## 2018-07-30 ENCOUNTER — OUTPATIENT (OUTPATIENT)
Dept: OUTPATIENT SERVICES | Facility: HOSPITAL | Age: 78
LOS: 1 days | End: 2018-07-30

## 2018-07-30 DIAGNOSIS — Z90.49 ACQUIRED ABSENCE OF OTHER SPECIFIED PARTS OF DIGESTIVE TRACT: Chronic | ICD-10-CM

## 2018-07-30 DIAGNOSIS — C32.0 MALIGNANT NEOPLASM OF GLOTTIS: Chronic | ICD-10-CM

## 2018-07-30 DIAGNOSIS — Z96.659 PRESENCE OF UNSPECIFIED ARTIFICIAL KNEE JOINT: Chronic | ICD-10-CM

## 2018-07-30 DIAGNOSIS — C34.91 MALIGNANT NEOPLASM OF UNSPECIFIED PART OF RIGHT BRONCHUS OR LUNG: ICD-10-CM

## 2018-07-30 PROCEDURE — 78815 PET IMAGE W/CT SKULL-THIGH: CPT | Mod: 26,PS

## 2018-08-01 ENCOUNTER — LABORATORY RESULT (OUTPATIENT)
Age: 78
End: 2018-08-01

## 2018-08-01 ENCOUNTER — APPOINTMENT (OUTPATIENT)
Dept: INFUSION THERAPY | Facility: CLINIC | Age: 78
End: 2018-08-01

## 2018-08-02 DIAGNOSIS — Z51.11 ENCOUNTER FOR ANTINEOPLASTIC CHEMOTHERAPY: ICD-10-CM

## 2018-08-08 ENCOUNTER — APPOINTMENT (OUTPATIENT)
Dept: PULMONOLOGY | Facility: CLINIC | Age: 78
End: 2018-08-08
Payer: MEDICARE

## 2018-08-08 VITALS — HEART RATE: 100 BPM | OXYGEN SATURATION: 95 %

## 2018-08-08 VITALS
BODY MASS INDEX: 29.92 KG/M2 | DIASTOLIC BLOOD PRESSURE: 60 MMHG | HEIGHT: 70 IN | SYSTOLIC BLOOD PRESSURE: 120 MMHG | WEIGHT: 209 LBS

## 2018-08-08 DIAGNOSIS — Z77.090 CONTACT WITH AND (SUSPECTED) EXPOSURE TO ASBESTOS: ICD-10-CM

## 2018-08-08 PROCEDURE — 99214 OFFICE O/P EST MOD 30 MIN: CPT

## 2018-08-15 ENCOUNTER — APPOINTMENT (OUTPATIENT)
Dept: INFUSION THERAPY | Facility: CLINIC | Age: 78
End: 2018-08-15

## 2018-08-15 ENCOUNTER — RX RENEWAL (OUTPATIENT)
Age: 78
End: 2018-08-15

## 2018-08-17 ENCOUNTER — OUTPATIENT (OUTPATIENT)
Dept: OUTPATIENT SERVICES | Facility: HOSPITAL | Age: 78
LOS: 1 days | Discharge: HOME | End: 2018-08-17

## 2018-08-17 DIAGNOSIS — Z96.659 PRESENCE OF UNSPECIFIED ARTIFICIAL KNEE JOINT: Chronic | ICD-10-CM

## 2018-08-17 DIAGNOSIS — Z90.49 ACQUIRED ABSENCE OF OTHER SPECIFIED PARTS OF DIGESTIVE TRACT: Chronic | ICD-10-CM

## 2018-08-17 DIAGNOSIS — C32.0 MALIGNANT NEOPLASM OF GLOTTIS: Chronic | ICD-10-CM

## 2018-08-17 DIAGNOSIS — C34.90 MALIGNANT NEOPLASM OF UNSPECIFIED PART OF UNSPECIFIED BRONCHUS OR LUNG: ICD-10-CM

## 2018-08-29 ENCOUNTER — APPOINTMENT (OUTPATIENT)
Dept: INFUSION THERAPY | Facility: CLINIC | Age: 78
End: 2018-08-29

## 2018-08-29 ENCOUNTER — RESULT REVIEW (OUTPATIENT)
Age: 78
End: 2018-08-29

## 2018-08-29 ENCOUNTER — LABORATORY RESULT (OUTPATIENT)
Age: 78
End: 2018-08-29

## 2018-08-29 LAB
BASOPHILS # BLD AUTO: 0 K/UL — SIGNIFICANT CHANGE UP (ref 0–0.2)
BASOPHILS NFR BLD AUTO: 0.4 % — SIGNIFICANT CHANGE UP (ref 0–2)
EOSINOPHIL # BLD AUTO: 0.2 K/UL — SIGNIFICANT CHANGE UP (ref 0–0.5)
EOSINOPHIL NFR BLD AUTO: 2.7 % — SIGNIFICANT CHANGE UP (ref 0–6)
HCT VFR BLD CALC: 33.4 % — LOW (ref 39–50)
HGB BLD-MCNC: 10.5 G/DL — LOW (ref 13–17)
LYMPHOCYTES # BLD AUTO: 1.5 K/UL — SIGNIFICANT CHANGE UP (ref 1–3.3)
LYMPHOCYTES # BLD AUTO: 21.9 % — SIGNIFICANT CHANGE UP (ref 13–44)
MCHC RBC-ENTMCNC: 27.7 PG — SIGNIFICANT CHANGE UP (ref 27–34)
MCHC RBC-ENTMCNC: 31.4 GM/DL — LOW (ref 32–36)
MCV RBC AUTO: 88.4 FL — SIGNIFICANT CHANGE UP (ref 80–100)
MONOCYTES # BLD AUTO: 0.6 K/UL — SIGNIFICANT CHANGE UP (ref 0–0.9)
MONOCYTES NFR BLD AUTO: 9.1 % — SIGNIFICANT CHANGE UP (ref 2–14)
NEUTROPHILS # BLD AUTO: 4.5 K/UL — SIGNIFICANT CHANGE UP (ref 1.8–7.4)
NEUTROPHILS NFR BLD AUTO: 65.8 % — SIGNIFICANT CHANGE UP (ref 43–77)
PLATELET # BLD AUTO: 189 K/UL — SIGNIFICANT CHANGE UP (ref 150–400)
RBC # BLD: 3.77 M/UL — LOW (ref 4.2–5.8)
RBC # FLD: 16 % — HIGH (ref 10.3–14.5)
WBC # BLD: 6.9 K/UL — SIGNIFICANT CHANGE UP (ref 3.8–10.5)
WBC # FLD AUTO: 6.9 K/UL — SIGNIFICANT CHANGE UP (ref 3.8–10.5)

## 2018-08-30 DIAGNOSIS — N18.3 CHRONIC KIDNEY DISEASE, STAGE 3 (MODERATE): ICD-10-CM

## 2018-08-30 DIAGNOSIS — Z51.11 ENCOUNTER FOR ANTINEOPLASTIC CHEMOTHERAPY: ICD-10-CM

## 2018-08-30 DIAGNOSIS — D63.1 ANEMIA IN CHRONIC KIDNEY DISEASE: ICD-10-CM

## 2018-08-30 DIAGNOSIS — C79.51 SECONDARY MALIGNANT NEOPLASM OF BONE: ICD-10-CM

## 2018-09-05 ENCOUNTER — APPOINTMENT (OUTPATIENT)
Dept: HEMATOLOGY ONCOLOGY | Facility: CLINIC | Age: 78
End: 2018-09-05

## 2018-09-12 ENCOUNTER — RESULT REVIEW (OUTPATIENT)
Age: 78
End: 2018-09-12

## 2018-09-12 ENCOUNTER — APPOINTMENT (OUTPATIENT)
Dept: INFUSION THERAPY | Facility: CLINIC | Age: 78
End: 2018-09-12

## 2018-09-12 LAB
BASOPHILS # BLD AUTO: 0 K/UL — SIGNIFICANT CHANGE UP (ref 0–0.2)
BASOPHILS NFR BLD AUTO: 0.6 % — SIGNIFICANT CHANGE UP (ref 0–2)
EOSINOPHIL # BLD AUTO: 0.2 K/UL — SIGNIFICANT CHANGE UP (ref 0–0.5)
EOSINOPHIL NFR BLD AUTO: 4.2 % — SIGNIFICANT CHANGE UP (ref 0–6)
HCT VFR BLD CALC: 34.3 % — LOW (ref 39–50)
HGB BLD-MCNC: 10.7 G/DL — LOW (ref 13–17)
LYMPHOCYTES # BLD AUTO: 1.5 K/UL — SIGNIFICANT CHANGE UP (ref 1–3.3)
LYMPHOCYTES # BLD AUTO: 25 % — SIGNIFICANT CHANGE UP (ref 13–44)
MCHC RBC-ENTMCNC: 27.4 PG — SIGNIFICANT CHANGE UP (ref 27–34)
MCHC RBC-ENTMCNC: 31.1 GM/DL — LOW (ref 32–36)
MCV RBC AUTO: 88.2 FL — SIGNIFICANT CHANGE UP (ref 80–100)
MONOCYTES # BLD AUTO: 0.6 K/UL — SIGNIFICANT CHANGE UP (ref 0–0.9)
MONOCYTES NFR BLD AUTO: 10.3 % — SIGNIFICANT CHANGE UP (ref 2–14)
NEUTROPHILS # BLD AUTO: 3.6 K/UL — SIGNIFICANT CHANGE UP (ref 1.8–7.4)
NEUTROPHILS NFR BLD AUTO: 59.9 % — SIGNIFICANT CHANGE UP (ref 43–77)
PLATELET # BLD AUTO: 191 K/UL — SIGNIFICANT CHANGE UP (ref 150–400)
RBC # BLD: 3.89 M/UL — LOW (ref 4.2–5.8)
RBC # FLD: 17.4 % — HIGH (ref 10.3–14.5)
WBC # BLD: 5.9 K/UL — SIGNIFICANT CHANGE UP (ref 3.8–10.5)
WBC # FLD AUTO: 5.9 K/UL — SIGNIFICANT CHANGE UP (ref 3.8–10.5)

## 2018-09-13 ENCOUNTER — APPOINTMENT (OUTPATIENT)
Dept: OPHTHALMOLOGY | Facility: CLINIC | Age: 78
End: 2018-09-13
Payer: MEDICARE

## 2018-09-13 DIAGNOSIS — G43.109 MIGRAINE WITH AURA, NOT INTRACTABLE, W/OUT STATUS MIGRAINOSUS: ICD-10-CM

## 2018-09-13 DIAGNOSIS — E11.3393 TYPE 2 DIABETES MELLITUS WITH MODERATE NONPROLIFERATIVE DIABETIC RETINOPATHY WITHOUT MACULAR EDEMA, BILATERAL: ICD-10-CM

## 2018-09-13 PROCEDURE — 92012 INTRM OPH EXAM EST PATIENT: CPT

## 2018-09-13 PROCEDURE — 92083 EXTENDED VISUAL FIELD XM: CPT

## 2018-09-19 ENCOUNTER — OUTPATIENT (OUTPATIENT)
Dept: OUTPATIENT SERVICES | Facility: HOSPITAL | Age: 78
LOS: 1 days | Discharge: HOME | End: 2018-09-19

## 2018-09-19 DIAGNOSIS — Z96.659 PRESENCE OF UNSPECIFIED ARTIFICIAL KNEE JOINT: Chronic | ICD-10-CM

## 2018-09-19 DIAGNOSIS — C34.90 MALIGNANT NEOPLASM OF UNSPECIFIED PART OF UNSPECIFIED BRONCHUS OR LUNG: ICD-10-CM

## 2018-09-19 DIAGNOSIS — Z90.49 ACQUIRED ABSENCE OF OTHER SPECIFIED PARTS OF DIGESTIVE TRACT: Chronic | ICD-10-CM

## 2018-09-19 DIAGNOSIS — C32.0 MALIGNANT NEOPLASM OF GLOTTIS: Chronic | ICD-10-CM

## 2018-09-26 ENCOUNTER — APPOINTMENT (OUTPATIENT)
Dept: INFUSION THERAPY | Facility: CLINIC | Age: 78
End: 2018-09-26

## 2018-09-27 DIAGNOSIS — C79.51 SECONDARY MALIGNANT NEOPLASM OF BONE: ICD-10-CM

## 2018-09-27 DIAGNOSIS — Z51.11 ENCOUNTER FOR ANTINEOPLASTIC CHEMOTHERAPY: ICD-10-CM

## 2018-10-11 ENCOUNTER — APPOINTMENT (OUTPATIENT)
Age: 78
End: 2018-10-11

## 2018-10-11 ENCOUNTER — LABORATORY RESULT (OUTPATIENT)
Age: 78
End: 2018-10-11

## 2018-10-11 ENCOUNTER — RESULT REVIEW (OUTPATIENT)
Age: 78
End: 2018-10-11

## 2018-10-11 LAB
BASOPHILS # BLD AUTO: 0 K/UL — SIGNIFICANT CHANGE UP (ref 0–0.2)
BASOPHILS NFR BLD AUTO: 0.4 % — SIGNIFICANT CHANGE UP (ref 0–2)
EOSINOPHIL # BLD AUTO: 0.2 K/UL — SIGNIFICANT CHANGE UP (ref 0–0.5)
EOSINOPHIL NFR BLD AUTO: 3.9 % — SIGNIFICANT CHANGE UP (ref 0–6)
HCT VFR BLD CALC: 32.5 % — LOW (ref 39–50)
HGB BLD-MCNC: 10.2 G/DL — LOW (ref 13–17)
LYMPHOCYTES # BLD AUTO: 1.8 K/UL — SIGNIFICANT CHANGE UP (ref 1–3.3)
LYMPHOCYTES # BLD AUTO: 28.8 % — SIGNIFICANT CHANGE UP (ref 13–44)
MCHC RBC-ENTMCNC: 27.3 PG — SIGNIFICANT CHANGE UP (ref 27–34)
MCHC RBC-ENTMCNC: 31.5 GM/DL — LOW (ref 32–36)
MCV RBC AUTO: 86.7 FL — SIGNIFICANT CHANGE UP (ref 80–100)
MONOCYTES # BLD AUTO: 0.6 K/UL — SIGNIFICANT CHANGE UP (ref 0–0.9)
MONOCYTES NFR BLD AUTO: 9.3 % — SIGNIFICANT CHANGE UP (ref 2–14)
NEUTROPHILS # BLD AUTO: 3.5 K/UL — SIGNIFICANT CHANGE UP (ref 1.8–7.4)
NEUTROPHILS NFR BLD AUTO: 57.6 % — SIGNIFICANT CHANGE UP (ref 43–77)
PLATELET # BLD AUTO: 185 K/UL — SIGNIFICANT CHANGE UP (ref 150–400)
RBC # BLD: 3.75 M/UL — LOW (ref 4.2–5.8)
RBC # FLD: 17.2 % — HIGH (ref 10.3–14.5)
WBC # BLD: 6.1 K/UL — SIGNIFICANT CHANGE UP (ref 3.8–10.5)
WBC # FLD AUTO: 6.1 K/UL — SIGNIFICANT CHANGE UP (ref 3.8–10.5)

## 2018-10-18 ENCOUNTER — OUTPATIENT (OUTPATIENT)
Dept: OUTPATIENT SERVICES | Facility: HOSPITAL | Age: 78
LOS: 1 days | Discharge: HOME | End: 2018-10-18

## 2018-10-18 DIAGNOSIS — N18.3 CHRONIC KIDNEY DISEASE, STAGE 3 (MODERATE): ICD-10-CM

## 2018-10-18 DIAGNOSIS — C79.51 SECONDARY MALIGNANT NEOPLASM OF BONE: ICD-10-CM

## 2018-10-18 DIAGNOSIS — Z90.49 ACQUIRED ABSENCE OF OTHER SPECIFIED PARTS OF DIGESTIVE TRACT: Chronic | ICD-10-CM

## 2018-10-18 DIAGNOSIS — Z96.659 PRESENCE OF UNSPECIFIED ARTIFICIAL KNEE JOINT: Chronic | ICD-10-CM

## 2018-10-18 DIAGNOSIS — C34.90 MALIGNANT NEOPLASM OF UNSPECIFIED PART OF UNSPECIFIED BRONCHUS OR LUNG: ICD-10-CM

## 2018-10-18 DIAGNOSIS — C32.0 MALIGNANT NEOPLASM OF GLOTTIS: Chronic | ICD-10-CM

## 2018-10-18 DIAGNOSIS — D63.1 ANEMIA IN CHRONIC KIDNEY DISEASE: ICD-10-CM

## 2018-10-23 ENCOUNTER — APPOINTMENT (OUTPATIENT)
Dept: HEMATOLOGY ONCOLOGY | Facility: CLINIC | Age: 78
End: 2018-10-23
Payer: MEDICARE

## 2018-10-23 ENCOUNTER — RESULT REVIEW (OUTPATIENT)
Age: 78
End: 2018-10-23

## 2018-10-23 VITALS
OXYGEN SATURATION: 93 % | HEIGHT: 70 IN | TEMPERATURE: 98.6 F | SYSTOLIC BLOOD PRESSURE: 146 MMHG | BODY MASS INDEX: 29.54 KG/M2 | HEART RATE: 82 BPM | WEIGHT: 206.35 LBS | DIASTOLIC BLOOD PRESSURE: 73 MMHG

## 2018-10-23 LAB
BASOPHILS # BLD AUTO: 0 K/UL — SIGNIFICANT CHANGE UP (ref 0–0.2)
BASOPHILS NFR BLD AUTO: 0.4 % — SIGNIFICANT CHANGE UP (ref 0–2)
EOSINOPHIL # BLD AUTO: 0.2 K/UL — SIGNIFICANT CHANGE UP (ref 0–0.5)
EOSINOPHIL NFR BLD AUTO: 2.3 % — SIGNIFICANT CHANGE UP (ref 0–6)
HCT VFR BLD CALC: 36.2 % — LOW (ref 39–50)
HGB BLD-MCNC: 11.3 G/DL — LOW (ref 13–17)
LYMPHOCYTES # BLD AUTO: 1.5 K/UL — SIGNIFICANT CHANGE UP (ref 1–3.3)
LYMPHOCYTES # BLD AUTO: 22.4 % — SIGNIFICANT CHANGE UP (ref 13–44)
MCHC RBC-ENTMCNC: 27.3 PG — SIGNIFICANT CHANGE UP (ref 27–34)
MCHC RBC-ENTMCNC: 31.3 GM/DL — LOW (ref 32–36)
MCV RBC AUTO: 87.2 FL — SIGNIFICANT CHANGE UP (ref 80–100)
MONOCYTES # BLD AUTO: 0.6 K/UL — SIGNIFICANT CHANGE UP (ref 0–0.9)
MONOCYTES NFR BLD AUTO: 9.5 % — SIGNIFICANT CHANGE UP (ref 2–14)
NEUTROPHILS # BLD AUTO: 4.5 K/UL — SIGNIFICANT CHANGE UP (ref 1.8–7.4)
NEUTROPHILS NFR BLD AUTO: 65.4 % — SIGNIFICANT CHANGE UP (ref 43–77)
PLATELET # BLD AUTO: 230 K/UL — SIGNIFICANT CHANGE UP (ref 150–400)
RBC # BLD: 4.15 M/UL — LOW (ref 4.2–5.8)
RBC # FLD: 16.5 % — HIGH (ref 10.3–14.5)
WBC # BLD: 6.8 K/UL — SIGNIFICANT CHANGE UP (ref 3.8–10.5)
WBC # FLD AUTO: 6.8 K/UL — SIGNIFICANT CHANGE UP (ref 3.8–10.5)

## 2018-10-23 PROCEDURE — 99214 OFFICE O/P EST MOD 30 MIN: CPT

## 2018-10-24 LAB
ALBUMIN SERPL ELPH-MCNC: 4.2 G/DL
ALP BLD-CCNC: 82 U/L
ALT SERPL-CCNC: 9 U/L
ANION GAP SERPL CALC-SCNC: 17 MMOL/L
AST SERPL-CCNC: 15 U/L
BILIRUB SERPL-MCNC: 0.5 MG/DL
BUN SERPL-MCNC: 22 MG/DL
CALCIUM SERPL-MCNC: 9 MG/DL
CHLORIDE SERPL-SCNC: 104 MMOL/L
CO2 SERPL-SCNC: 21 MMOL/L
CREAT SERPL-MCNC: 1.59 MG/DL
GLUCOSE SERPL-MCNC: 91 MG/DL
POTASSIUM SERPL-SCNC: 5 MMOL/L
PROT SERPL-MCNC: 7.3 G/DL
SODIUM SERPL-SCNC: 142 MMOL/L

## 2018-10-25 ENCOUNTER — APPOINTMENT (OUTPATIENT)
Age: 78
End: 2018-10-25

## 2018-10-26 DIAGNOSIS — Z51.11 ENCOUNTER FOR ANTINEOPLASTIC CHEMOTHERAPY: ICD-10-CM

## 2018-10-30 ENCOUNTER — FORM ENCOUNTER (OUTPATIENT)
Age: 78
End: 2018-10-30

## 2018-10-31 ENCOUNTER — APPOINTMENT (OUTPATIENT)
Dept: NUCLEAR MEDICINE | Facility: CLINIC | Age: 78
End: 2018-10-31
Payer: MEDICARE

## 2018-10-31 ENCOUNTER — OUTPATIENT (OUTPATIENT)
Dept: OUTPATIENT SERVICES | Facility: HOSPITAL | Age: 78
LOS: 1 days | End: 2018-10-31

## 2018-10-31 DIAGNOSIS — C32.0 MALIGNANT NEOPLASM OF GLOTTIS: Chronic | ICD-10-CM

## 2018-10-31 DIAGNOSIS — C34.91 MALIGNANT NEOPLASM OF UNSPECIFIED PART OF RIGHT BRONCHUS OR LUNG: ICD-10-CM

## 2018-10-31 DIAGNOSIS — Z90.49 ACQUIRED ABSENCE OF OTHER SPECIFIED PARTS OF DIGESTIVE TRACT: Chronic | ICD-10-CM

## 2018-10-31 DIAGNOSIS — Z96.659 PRESENCE OF UNSPECIFIED ARTIFICIAL KNEE JOINT: Chronic | ICD-10-CM

## 2018-10-31 PROCEDURE — 78815 PET IMAGE W/CT SKULL-THIGH: CPT | Mod: 26,PS

## 2018-11-07 ENCOUNTER — RESULT REVIEW (OUTPATIENT)
Age: 78
End: 2018-11-07

## 2018-11-07 ENCOUNTER — APPOINTMENT (OUTPATIENT)
Age: 78
End: 2018-11-07

## 2018-11-07 ENCOUNTER — LABORATORY RESULT (OUTPATIENT)
Age: 78
End: 2018-11-07

## 2018-11-07 LAB
BASOPHILS # BLD AUTO: 0 K/UL — SIGNIFICANT CHANGE UP (ref 0–0.2)
BASOPHILS NFR BLD AUTO: 0.3 % — SIGNIFICANT CHANGE UP (ref 0–2)
EOSINOPHIL # BLD AUTO: 0.2 K/UL — SIGNIFICANT CHANGE UP (ref 0–0.5)
EOSINOPHIL NFR BLD AUTO: 2.5 % — SIGNIFICANT CHANGE UP (ref 0–6)
HCT VFR BLD CALC: 32.9 % — LOW (ref 39–50)
HGB BLD-MCNC: 10.2 G/DL — LOW (ref 13–17)
LYMPHOCYTES # BLD AUTO: 1.5 K/UL — SIGNIFICANT CHANGE UP (ref 1–3.3)
LYMPHOCYTES # BLD AUTO: 21.9 % — SIGNIFICANT CHANGE UP (ref 13–44)
MCHC RBC-ENTMCNC: 27.2 PG — SIGNIFICANT CHANGE UP (ref 27–34)
MCHC RBC-ENTMCNC: 31.2 GM/DL — LOW (ref 32–36)
MCV RBC AUTO: 87.2 FL — SIGNIFICANT CHANGE UP (ref 80–100)
MONOCYTES # BLD AUTO: 0.7 K/UL — SIGNIFICANT CHANGE UP (ref 0–0.9)
MONOCYTES NFR BLD AUTO: 10.9 % — SIGNIFICANT CHANGE UP (ref 2–14)
NEUTROPHILS # BLD AUTO: 4.3 K/UL — SIGNIFICANT CHANGE UP (ref 1.8–7.4)
NEUTROPHILS NFR BLD AUTO: 64.4 % — SIGNIFICANT CHANGE UP (ref 43–77)
PLATELET # BLD AUTO: 226 K/UL — SIGNIFICANT CHANGE UP (ref 150–400)
RBC # BLD: 3.77 M/UL — LOW (ref 4.2–5.8)
RBC # FLD: 15.8 % — HIGH (ref 10.3–14.5)
WBC # BLD: 6.7 K/UL — SIGNIFICANT CHANGE UP (ref 3.8–10.5)
WBC # FLD AUTO: 6.7 K/UL — SIGNIFICANT CHANGE UP (ref 3.8–10.5)

## 2018-11-13 ENCOUNTER — OUTPATIENT (OUTPATIENT)
Dept: OUTPATIENT SERVICES | Facility: HOSPITAL | Age: 78
LOS: 1 days | Discharge: HOME | End: 2018-11-13

## 2018-11-13 DIAGNOSIS — Z90.49 ACQUIRED ABSENCE OF OTHER SPECIFIED PARTS OF DIGESTIVE TRACT: Chronic | ICD-10-CM

## 2018-11-13 DIAGNOSIS — C34.90 MALIGNANT NEOPLASM OF UNSPECIFIED PART OF UNSPECIFIED BRONCHUS OR LUNG: ICD-10-CM

## 2018-11-13 DIAGNOSIS — C79.51 SECONDARY MALIGNANT NEOPLASM OF BONE: ICD-10-CM

## 2018-11-13 DIAGNOSIS — C32.0 MALIGNANT NEOPLASM OF GLOTTIS: Chronic | ICD-10-CM

## 2018-11-13 DIAGNOSIS — D63.1 ANEMIA IN CHRONIC KIDNEY DISEASE: ICD-10-CM

## 2018-11-13 DIAGNOSIS — Z96.659 PRESENCE OF UNSPECIFIED ARTIFICIAL KNEE JOINT: Chronic | ICD-10-CM

## 2018-11-13 DIAGNOSIS — N18.3 CHRONIC KIDNEY DISEASE, STAGE 3 (MODERATE): ICD-10-CM

## 2018-11-20 ENCOUNTER — APPOINTMENT (OUTPATIENT)
Dept: ORTHOPEDIC SURGERY | Facility: CLINIC | Age: 78
End: 2018-11-20
Payer: MEDICARE

## 2018-11-20 PROCEDURE — 73060 X-RAY EXAM OF HUMERUS: CPT | Mod: RT

## 2018-11-20 PROCEDURE — 99213 OFFICE O/P EST LOW 20 MIN: CPT

## 2018-11-21 ENCOUNTER — RESULT REVIEW (OUTPATIENT)
Age: 78
End: 2018-11-21

## 2018-11-21 ENCOUNTER — APPOINTMENT (OUTPATIENT)
Age: 78
End: 2018-11-21

## 2018-11-21 ENCOUNTER — LABORATORY RESULT (OUTPATIENT)
Age: 78
End: 2018-11-21

## 2018-11-21 ENCOUNTER — APPOINTMENT (OUTPATIENT)
Dept: HEMATOLOGY ONCOLOGY | Facility: CLINIC | Age: 78
End: 2018-11-21
Payer: MEDICARE

## 2018-11-21 LAB
BASOPHILS # BLD AUTO: 0 K/UL — SIGNIFICANT CHANGE UP (ref 0–0.2)
BASOPHILS NFR BLD AUTO: 0.3 % — SIGNIFICANT CHANGE UP (ref 0–2)
EOSINOPHIL # BLD AUTO: 0.1 K/UL — SIGNIFICANT CHANGE UP (ref 0–0.5)
EOSINOPHIL NFR BLD AUTO: 1.7 % — SIGNIFICANT CHANGE UP (ref 0–6)
HCT VFR BLD CALC: 34.8 % — LOW (ref 39–50)
HGB BLD-MCNC: 10.7 G/DL — LOW (ref 13–17)
LYMPHOCYTES # BLD AUTO: 1.7 K/UL — SIGNIFICANT CHANGE UP (ref 1–3.3)
LYMPHOCYTES # BLD AUTO: 21.2 % — SIGNIFICANT CHANGE UP (ref 13–44)
MCHC RBC-ENTMCNC: 27.4 PG — SIGNIFICANT CHANGE UP (ref 27–34)
MCHC RBC-ENTMCNC: 30.7 GM/DL — LOW (ref 32–36)
MCV RBC AUTO: 89.3 FL — SIGNIFICANT CHANGE UP (ref 80–100)
MONOCYTES # BLD AUTO: 0.8 K/UL — SIGNIFICANT CHANGE UP (ref 0–0.9)
MONOCYTES NFR BLD AUTO: 9.5 % — SIGNIFICANT CHANGE UP (ref 2–14)
NEUTROPHILS # BLD AUTO: 5.3 K/UL — SIGNIFICANT CHANGE UP (ref 1.8–7.4)
NEUTROPHILS NFR BLD AUTO: 67.3 % — SIGNIFICANT CHANGE UP (ref 43–77)
PLATELET # BLD AUTO: 250 K/UL — SIGNIFICANT CHANGE UP (ref 150–400)
RBC # BLD: 3.9 M/UL — LOW (ref 4.2–5.8)
RBC # FLD: 16.1 % — HIGH (ref 10.3–14.5)
WBC # BLD: 7.9 K/UL — SIGNIFICANT CHANGE UP (ref 3.8–10.5)
WBC # FLD AUTO: 7.9 K/UL — SIGNIFICANT CHANGE UP (ref 3.8–10.5)

## 2018-11-21 PROCEDURE — 99214 OFFICE O/P EST MOD 30 MIN: CPT

## 2018-11-21 NOTE — HISTORY OF PRESENT ILLNESS
[de-identified] : Denny continues to feel well.\par He's been seen by Dr. Richard, and no significant fracture risk was identified at the site of cortical erosion over the proximal right humerus.\par A followup PET scan shows decreased size and avidity of the mediastinal and right hilar adenopathy, resolution of the segment 5 hepatic focus, and in general, is consistent with pseudo-progression in July, followed by evidence of excellent clinical response in November.

## 2018-11-21 NOTE — ASSESSMENT
[FreeTextEntry1] : Adenocarcinoma of lung, responding to nivolumab, with radiologic series, consistent with pseudo-progression now, followed by clear evidence of strong partial response.

## 2018-12-05 ENCOUNTER — APPOINTMENT (OUTPATIENT)
Dept: HEMATOLOGY ONCOLOGY | Facility: CLINIC | Age: 78
End: 2018-12-05

## 2018-12-05 ENCOUNTER — RESULT REVIEW (OUTPATIENT)
Age: 78
End: 2018-12-05

## 2018-12-05 ENCOUNTER — LABORATORY RESULT (OUTPATIENT)
Age: 78
End: 2018-12-05

## 2018-12-05 ENCOUNTER — APPOINTMENT (OUTPATIENT)
Age: 78
End: 2018-12-05

## 2018-12-05 LAB
ANISOCYTOSIS BLD QL: SLIGHT — SIGNIFICANT CHANGE UP
BASOPHILS # BLD AUTO: 0 K/UL — SIGNIFICANT CHANGE UP (ref 0–0.2)
EOSINOPHIL # BLD AUTO: 0.1 K/UL — SIGNIFICANT CHANGE UP (ref 0–0.5)
EOSINOPHIL NFR BLD AUTO: 3 % — SIGNIFICANT CHANGE UP (ref 0–6)
HCT VFR BLD CALC: 33 % — LOW (ref 39–50)
HGB BLD-MCNC: 10.4 G/DL — LOW (ref 13–17)
HYPOCHROMIA BLD QL: SLIGHT — SIGNIFICANT CHANGE UP
LYMPHOCYTES # BLD AUTO: 1.3 K/UL — SIGNIFICANT CHANGE UP (ref 1–3.3)
LYMPHOCYTES # BLD AUTO: 21 % — SIGNIFICANT CHANGE UP (ref 13–44)
MACROCYTES BLD QL: SLIGHT — SIGNIFICANT CHANGE UP
MCHC RBC-ENTMCNC: 28 PG — SIGNIFICANT CHANGE UP (ref 27–34)
MCHC RBC-ENTMCNC: 31.4 GM/DL — LOW (ref 32–36)
MCV RBC AUTO: 89.3 FL — SIGNIFICANT CHANGE UP (ref 80–100)
MICROCYTES BLD QL: SLIGHT — SIGNIFICANT CHANGE UP
MONOCYTES # BLD AUTO: 0.5 K/UL — SIGNIFICANT CHANGE UP (ref 0–0.9)
MONOCYTES NFR BLD AUTO: 7 % — SIGNIFICANT CHANGE UP (ref 2–14)
NEUTROPHILS # BLD AUTO: 4.1 K/UL — SIGNIFICANT CHANGE UP (ref 1.8–7.4)
NEUTROPHILS NFR BLD AUTO: 69 % — SIGNIFICANT CHANGE UP (ref 43–77)
PLAT MORPH BLD: NORMAL — SIGNIFICANT CHANGE UP
PLATELET # BLD AUTO: 257 K/UL — SIGNIFICANT CHANGE UP (ref 150–400)
POIKILOCYTOSIS BLD QL AUTO: SLIGHT — SIGNIFICANT CHANGE UP
RBC # BLD: 3.69 M/UL — LOW (ref 4.2–5.8)
RBC # FLD: 15.6 % — HIGH (ref 10.3–14.5)
RBC BLD AUTO: SIGNIFICANT CHANGE UP
WBC # BLD: 6 K/UL — SIGNIFICANT CHANGE UP (ref 3.8–10.5)
WBC # FLD AUTO: 6 K/UL — SIGNIFICANT CHANGE UP (ref 3.8–10.5)

## 2018-12-06 DIAGNOSIS — Z51.11 ENCOUNTER FOR ANTINEOPLASTIC CHEMOTHERAPY: ICD-10-CM

## 2018-12-11 ENCOUNTER — CHART COPY (OUTPATIENT)
Age: 78
End: 2018-12-11

## 2018-12-11 ENCOUNTER — APPOINTMENT (OUTPATIENT)
Dept: PULMONOLOGY | Facility: CLINIC | Age: 78
End: 2018-12-11
Payer: MEDICARE

## 2018-12-11 VITALS
HEART RATE: 78 BPM | SYSTOLIC BLOOD PRESSURE: 140 MMHG | HEIGHT: 70 IN | BODY MASS INDEX: 29.35 KG/M2 | OXYGEN SATURATION: 97 % | DIASTOLIC BLOOD PRESSURE: 68 MMHG | WEIGHT: 205 LBS

## 2018-12-11 PROCEDURE — 99214 OFFICE O/P EST MOD 30 MIN: CPT

## 2018-12-11 RX ORDER — PROCHLORPERAZINE MALEATE 10 MG/1
10 TABLET ORAL EVERY 6 HOURS
Qty: 28 | Refills: 2 | Status: ACTIVE | COMMUNITY
Start: 2018-08-15

## 2018-12-17 NOTE — H&P PST ADULT - BSA (M2)
Consent was obtained from the patient. The risks and benefits to therapy were discussed in detail. Specifically, the risks of infection, scarring, bleeding, prolonged wound healing, incomplete removal, allergy to anesthesia, nerve injury and recurrence were addressed. Prior to the procedure, the treatment site was clearly identified and confirmed by the patient. All components of Universal Protocol/PAUSE Rule completed. 2.19

## 2018-12-19 ENCOUNTER — APPOINTMENT (OUTPATIENT)
Age: 78
End: 2018-12-19

## 2018-12-21 ENCOUNTER — OUTPATIENT (OUTPATIENT)
Dept: OUTPATIENT SERVICES | Facility: HOSPITAL | Age: 78
LOS: 1 days | Discharge: HOME | End: 2018-12-21

## 2018-12-21 DIAGNOSIS — C34.90 MALIGNANT NEOPLASM OF UNSPECIFIED PART OF UNSPECIFIED BRONCHUS OR LUNG: ICD-10-CM

## 2018-12-21 DIAGNOSIS — D63.1 ANEMIA IN CHRONIC KIDNEY DISEASE: ICD-10-CM

## 2018-12-21 DIAGNOSIS — Z96.659 PRESENCE OF UNSPECIFIED ARTIFICIAL KNEE JOINT: Chronic | ICD-10-CM

## 2018-12-21 DIAGNOSIS — C32.0 MALIGNANT NEOPLASM OF GLOTTIS: Chronic | ICD-10-CM

## 2018-12-21 DIAGNOSIS — N18.3 CHRONIC KIDNEY DISEASE, STAGE 3 (MODERATE): ICD-10-CM

## 2018-12-21 DIAGNOSIS — Z90.49 ACQUIRED ABSENCE OF OTHER SPECIFIED PARTS OF DIGESTIVE TRACT: Chronic | ICD-10-CM

## 2018-12-21 DIAGNOSIS — C79.51 SECONDARY MALIGNANT NEOPLASM OF BONE: ICD-10-CM

## 2019-01-04 ENCOUNTER — APPOINTMENT (OUTPATIENT)
Dept: HEMATOLOGY ONCOLOGY | Facility: CLINIC | Age: 79
End: 2019-01-04
Payer: MEDICARE

## 2019-01-04 ENCOUNTER — FORM ENCOUNTER (OUTPATIENT)
Age: 79
End: 2019-01-04

## 2019-01-04 ENCOUNTER — RESULT REVIEW (OUTPATIENT)
Age: 79
End: 2019-01-04

## 2019-01-04 ENCOUNTER — APPOINTMENT (OUTPATIENT)
Age: 79
End: 2019-01-04

## 2019-01-04 ENCOUNTER — LABORATORY RESULT (OUTPATIENT)
Age: 79
End: 2019-01-04

## 2019-01-04 VITALS
TEMPERATURE: 98 F | BODY MASS INDEX: 28.63 KG/M2 | HEART RATE: 73 BPM | DIASTOLIC BLOOD PRESSURE: 68 MMHG | HEIGHT: 70 IN | WEIGHT: 200.01 LBS | SYSTOLIC BLOOD PRESSURE: 115 MMHG | OXYGEN SATURATION: 97 %

## 2019-01-04 LAB
BASOPHILS # BLD AUTO: 0 K/UL — SIGNIFICANT CHANGE UP (ref 0–0.2)
BASOPHILS NFR BLD AUTO: 0.5 % — SIGNIFICANT CHANGE UP (ref 0–2)
EOSINOPHIL # BLD AUTO: 0.2 K/UL — SIGNIFICANT CHANGE UP (ref 0–0.5)
EOSINOPHIL NFR BLD AUTO: 1.9 % — SIGNIFICANT CHANGE UP (ref 0–6)
HCT VFR BLD CALC: 36.4 % — LOW (ref 39–50)
HGB BLD-MCNC: 11.1 G/DL — LOW (ref 13–17)
LYMPHOCYTES # BLD AUTO: 1.5 K/UL — SIGNIFICANT CHANGE UP (ref 1–3.3)
LYMPHOCYTES # BLD AUTO: 17.9 % — SIGNIFICANT CHANGE UP (ref 13–44)
MCHC RBC-ENTMCNC: 26.2 PG — LOW (ref 27–34)
MCHC RBC-ENTMCNC: 30.4 GM/DL — LOW (ref 32–36)
MCV RBC AUTO: 86.2 FL — SIGNIFICANT CHANGE UP (ref 80–100)
MONOCYTES # BLD AUTO: 0.7 K/UL — SIGNIFICANT CHANGE UP (ref 0–0.9)
MONOCYTES NFR BLD AUTO: 8.6 % — SIGNIFICANT CHANGE UP (ref 2–14)
NEUTROPHILS # BLD AUTO: 6.1 K/UL — SIGNIFICANT CHANGE UP (ref 1.8–7.4)
NEUTROPHILS NFR BLD AUTO: 71.1 % — SIGNIFICANT CHANGE UP (ref 43–77)
PLATELET # BLD AUTO: 316 K/UL — SIGNIFICANT CHANGE UP (ref 150–400)
RBC # BLD: 4.23 M/UL — SIGNIFICANT CHANGE UP (ref 4.2–5.8)
RBC # FLD: 15.2 % — HIGH (ref 10.3–14.5)
WBC # BLD: 8.6 K/UL — SIGNIFICANT CHANGE UP (ref 3.8–10.5)
WBC # FLD AUTO: 8.6 K/UL — SIGNIFICANT CHANGE UP (ref 3.8–10.5)

## 2019-01-04 PROCEDURE — 99214 OFFICE O/P EST MOD 30 MIN: CPT

## 2019-01-04 RX ORDER — METFORMIN HYDROCHLORIDE 500 MG/1
500 TABLET, COATED ORAL
Refills: 0 | Status: ACTIVE | COMMUNITY

## 2019-01-04 RX ORDER — GLUC/MSM/COLGN2/HYAL/ANTIARTH3 375-375-20
TABLET ORAL
Refills: 0 | Status: ACTIVE | COMMUNITY

## 2019-01-04 RX ORDER — FOLIC ACID 1 MG/1
1 TABLET ORAL DAILY
Qty: 30 | Refills: 5 | Status: DISCONTINUED | COMMUNITY
Start: 2018-02-14 | End: 2019-01-04

## 2019-01-04 RX ORDER — ACETAMINOPHEN AND CODEINE 300; 30 MG/1; MG/1
300-30 TABLET ORAL 3 TIMES DAILY
Qty: 90 | Refills: 0 | Status: DISCONTINUED | COMMUNITY
Start: 2018-12-20 | End: 2019-01-04

## 2019-01-04 RX ORDER — CYCLOPENTOLATE HYDROCHLORIDE 10 MG/ML
1 SOLUTION OPHTHALMIC
Qty: 2 | Refills: 0 | Status: DISCONTINUED | COMMUNITY
Start: 2018-05-25 | End: 2019-01-04

## 2019-01-04 NOTE — REVIEW OF SYSTEMS
[Negative] : Allergic/Immunologic [FreeTextEntry9] : pain in rt upper arm, radiating to right shoulder downr to his hand

## 2019-01-04 NOTE — ASSESSMENT
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 Pain is increasing in right upper extremity. case discussed with Dr. Kitchen. will get Xray of humerus and right shoulder, and refer him back to Dr. Morgan for possible radiation therapy to metastatic bone lesion for pain control. In the meantime, will try Fentanyl 12 mcg to start in hopes of better pain control.  done and escribed to his pharmacy.Will continue with Nivolumab for systemic control of disease.

## 2019-01-04 NOTE — HISTORY OF PRESENT ILLNESS
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 He is a retired  with asbestos exposure and pleural plaques on CXR.\par In August 2017 he completed 5000 cGy for a [id number] adenocarcinoma of the RUL lung. A PET scan in 112/17 showed the RUL nodule smaller and less PET-avid, but a new RUL nodule and new right paratracheal adenopathy were seen as well as a right proximal humerus lesion that was new.\par Bronchoscopy and endobronchial ultrasound could not confirm malignancy on cytopathology, but nuclear bone scan confirmed the humerus lesion as metastatic. [de-identified] : Patient has been on Nivolumab and tolerating this well. Has had increased pain in right arm, known site of metastatic dissease, currently on Vicodin.

## 2019-01-04 NOTE — PHYSICAL EXAM
[Restricted in physically strenuous activity but ambulatory and able to carry out work of a light or sedentary nature] : Status 1- Restricted in physically strenuous activity but ambulatory and able to carry out work of a light or sedentary nature, e.g., light house work, office work [Normal] : affect appropriate [de-identified] : no gross mass palpable in right upper arm, tenderness elicited in posterior upper arm with palpation, pulse intact both upper extremities,  [de-identified] : hand  strength less in right hand

## 2019-01-05 ENCOUNTER — APPOINTMENT (OUTPATIENT)
Dept: RADIOLOGY | Facility: CLINIC | Age: 79
End: 2019-01-05
Payer: MEDICARE

## 2019-01-05 ENCOUNTER — OUTPATIENT (OUTPATIENT)
Dept: OUTPATIENT SERVICES | Facility: HOSPITAL | Age: 79
LOS: 1 days | End: 2019-01-05
Payer: MEDICARE

## 2019-01-05 DIAGNOSIS — C32.0 MALIGNANT NEOPLASM OF GLOTTIS: Chronic | ICD-10-CM

## 2019-01-05 DIAGNOSIS — Z90.49 ACQUIRED ABSENCE OF OTHER SPECIFIED PARTS OF DIGESTIVE TRACT: Chronic | ICD-10-CM

## 2019-01-05 DIAGNOSIS — C34.90 MALIGNANT NEOPLASM OF UNSPECIFIED PART OF UNSPECIFIED BRONCHUS OR LUNG: ICD-10-CM

## 2019-01-05 DIAGNOSIS — Z96.659 PRESENCE OF UNSPECIFIED ARTIFICIAL KNEE JOINT: Chronic | ICD-10-CM

## 2019-01-05 PROCEDURE — 73060 X-RAY EXAM OF HUMERUS: CPT

## 2019-01-05 PROCEDURE — 73030 X-RAY EXAM OF SHOULDER: CPT | Mod: 26,RT

## 2019-01-05 PROCEDURE — 73030 X-RAY EXAM OF SHOULDER: CPT

## 2019-01-05 PROCEDURE — 73060 X-RAY EXAM OF HUMERUS: CPT | Mod: 26,RT

## 2019-01-07 DIAGNOSIS — Z51.11 ENCOUNTER FOR ANTINEOPLASTIC CHEMOTHERAPY: ICD-10-CM

## 2019-01-08 ENCOUNTER — APPOINTMENT (OUTPATIENT)
Dept: RADIATION ONCOLOGY | Facility: CLINIC | Age: 79
End: 2019-01-08
Payer: MEDICARE

## 2019-01-08 ENCOUNTER — OUTPATIENT (OUTPATIENT)
Dept: OUTPATIENT SERVICES | Facility: HOSPITAL | Age: 79
LOS: 1 days | Discharge: HOME | End: 2019-01-08
Payer: MEDICARE

## 2019-01-08 VITALS
TEMPERATURE: 98.1 F | HEIGHT: 70 IN | OXYGEN SATURATION: 91 % | HEART RATE: 84 BPM | BODY MASS INDEX: 28.92 KG/M2 | RESPIRATION RATE: 16 BRPM | WEIGHT: 202 LBS | SYSTOLIC BLOOD PRESSURE: 110 MMHG | DIASTOLIC BLOOD PRESSURE: 67 MMHG

## 2019-01-08 DIAGNOSIS — Z90.49 ACQUIRED ABSENCE OF OTHER SPECIFIED PARTS OF DIGESTIVE TRACT: Chronic | ICD-10-CM

## 2019-01-08 DIAGNOSIS — Z96.659 PRESENCE OF UNSPECIFIED ARTIFICIAL KNEE JOINT: Chronic | ICD-10-CM

## 2019-01-08 DIAGNOSIS — C32.0 MALIGNANT NEOPLASM OF GLOTTIS: Chronic | ICD-10-CM

## 2019-01-08 PROCEDURE — 99214 OFFICE O/P EST MOD 30 MIN: CPT | Mod: 25

## 2019-01-08 NOTE — REASON FOR VISIT
[Bone Metastasis] : bone metastasis [Lung Cancer] : lung cancer [Spouse] : spouse [Re-evaluation] : re-evaluation for

## 2019-01-08 NOTE — REVIEW OF SYSTEMS
[Joint Pain] : joint pain [Negative] : Allergic/Immunologic [Edema Limbs: Grade 0] : Edema Limbs: Grade 0  [Fatigue: Grade 0] : Fatigue: Grade 0 [Localized Edema: Grade 0] : Localized Edema: Grade 0  [Neck Edema: Grade 0] : Neck Edema: Grade 0 [Skin Hyperpigmentation: Grade 0] : Skin Hyperpigmentation: Grade 0 [Skin Induration: Grade 0] : Skin Induration: Grade 0 [Dermatitis Radiation: Grade 0] : Dermatitis Radiation: Grade 0 [FreeTextEntry9] : right shoulder when sleeping on it.

## 2019-01-08 NOTE — VITALS
[Maximal Pain Intensity: 5/10] : 5/10 [Least Pain Intensity: 2/10] : 2/10 [Pain Description/Quality: ___] : Pain description/quality: [unfilled] [Pain Duration: ___] : Pain duration: [unfilled] [Pain Location: ___] : Pain Location: [unfilled] [Pain Interferes with ADLs] : Pain interferes with activities of daily living. [Opioid] : opioid [80: Normal activity with effort; some signs or symptoms of disease.] : 80: Normal activity with effort; some signs or symptoms of disease.  [ECOG Performance Status: 1 - Restricted in physically strenuous activity but ambulatory and able to carry out work of a light or sedentary nature] : Performance Status: 1 - Restricted in physically strenuous activity but ambulatory and able to carry out work of a light or sedentary nature, e.g., light house work, office work

## 2019-01-08 NOTE — LETTER GREETING
[Dear Doctor] : Dear Doctor, [Follow-Up] : Your patient, [unfilled] was seen in my office today for follow-up [Please see my note below.] : Please see my note below. [FreeTextEntry2] : Leonel Deng MD

## 2019-01-08 NOTE — LETTER CLOSING
[Consult Closing:] : Thank you for allowing me to participate in the care of this patient.  If you have any questions, please do not hesitate to contact me. [Sincerely yours,] : Sincerely yours, [FreeTextEntry3] : Michael Morgan MD

## 2019-01-08 NOTE — HISTORY OF PRESENT ILLNESS
[FreeTextEntry1] : This 78 year-old male returns today s/p radiation therapy (5,000 cGy) for [id number] adenocarcinoma of the right lung completed 8/16/17.  Denies dyspnea, cough, orthopnea, odynophagia, dysphagia, edema, fatigue & weight loss. He was evaluated by  Dr. Daly and follow up PET/CT scan showed FDG avid paratracheal LN, spot on humerus w/o CT correlate, decrease in size right upper lobe mass. He underwent FOB-EBUS with Dr Collins on 1/9/18 which revealed  no carcinoma but possibly non diagnostic. He was then referred for a bone scan on 1/29/18 which revealed a solitary uptake in the right humeral diaphysis, recommend MRI.  The patient follows with Dr. Deng and has been maintained on Opdivo. He reports good control for lung lesions.  However, he reports increasing pain in his right arm for which he wears a 12.5  microgram fentanyl transdermal patch and takes oxycodone every 4 hours.  X-ray of the right humerus was done on 1/5/2019

## 2019-01-08 NOTE — DISEASE MANAGEMENT
[Pathological] : TNM Stage: p [IV] : IV [FreeTextEntry4] : adenocarcinoma of the right lung with bone metastasis  [TTNM] : 1b [NTNM] : 0 [MTNM] : 1

## 2019-01-08 NOTE — PHYSICAL EXAM
[General Appearance - Well Nourished] : well nourished [General Appearance - Alert] : alert [General Appearance - In No Acute Distress] : in no acute distress [Normal] : oriented to person, place and time, the affect was normal, the mood was normal and not anxious [Affect] : the affect was normal [Mood] : the mood was normal [Not Anxious] : not anxious [de-identified] : deferred [FreeTextEntry1] : deferred [de-identified] : deferred [de-identified] : tenderness with palpation of mid right proximal arm.

## 2019-01-08 NOTE — ASSESSMENT
[Metastatic disease without local control] : Metastatic disease without local control [FreeTextEntry1] : radiographic progression of malignant bony metastatic disease . Good response to SBRT to lung. No appreciable treatment related sequelae.

## 2019-01-09 ENCOUNTER — APPOINTMENT (OUTPATIENT)
Age: 79
End: 2019-01-09

## 2019-01-14 PROCEDURE — 77280 THER RAD SIMULAJ FIELD SMPL: CPT | Mod: 26

## 2019-01-14 PROCEDURE — 77431 RADIATION THERAPY MANAGEMENT: CPT

## 2019-01-14 NOTE — VITALS
[Maximal Pain Intensity: 2/10] : 2/10 [Least Pain Intensity: 0/10] : 0/10 [Pain Description/Quality: ___] : Pain description/quality: [unfilled] [Pain Duration: ___] : Pain duration: [unfilled] [Pain Location: ___] : Pain Location: [unfilled] [Pain Interferes with ADLs] : Pain does not interfere with activities of daily living [Opioid] : opioid

## 2019-01-14 NOTE — DISEASE MANAGEMENT
[Pathological] : TNM Stage: p [FreeTextEntry4] : bone metastasis [TTNM] : 1b [NTNM] : 0 [MTNM] : 1 [IV] : IV [de-identified] : 800 cGy [de-identified] : 800 cGy [de-identified] : right humerus

## 2019-01-16 ENCOUNTER — RX RENEWAL (OUTPATIENT)
Age: 79
End: 2019-01-16

## 2019-01-28 ENCOUNTER — OUTPATIENT (OUTPATIENT)
Dept: OUTPATIENT SERVICES | Facility: HOSPITAL | Age: 79
LOS: 1 days | Discharge: HOME | End: 2019-01-28

## 2019-01-28 DIAGNOSIS — Z96.659 PRESENCE OF UNSPECIFIED ARTIFICIAL KNEE JOINT: Chronic | ICD-10-CM

## 2019-01-28 DIAGNOSIS — D63.1 ANEMIA IN CHRONIC KIDNEY DISEASE: ICD-10-CM

## 2019-01-28 DIAGNOSIS — C79.51 SECONDARY MALIGNANT NEOPLASM OF BONE: ICD-10-CM

## 2019-01-28 DIAGNOSIS — C32.0 MALIGNANT NEOPLASM OF GLOTTIS: Chronic | ICD-10-CM

## 2019-01-28 DIAGNOSIS — N18.3 CHRONIC KIDNEY DISEASE, STAGE 3 (MODERATE): ICD-10-CM

## 2019-01-28 DIAGNOSIS — Z90.49 ACQUIRED ABSENCE OF OTHER SPECIFIED PARTS OF DIGESTIVE TRACT: Chronic | ICD-10-CM

## 2019-01-28 DIAGNOSIS — C34.90 MALIGNANT NEOPLASM OF UNSPECIFIED PART OF UNSPECIFIED BRONCHUS OR LUNG: ICD-10-CM

## 2019-01-30 ENCOUNTER — APPOINTMENT (OUTPATIENT)
Dept: RADIATION ONCOLOGY | Facility: CLINIC | Age: 79
End: 2019-01-30
Payer: MEDICARE

## 2019-01-30 VITALS
SYSTOLIC BLOOD PRESSURE: 121 MMHG | WEIGHT: 195.6 LBS | RESPIRATION RATE: 16 BRPM | HEIGHT: 70 IN | BODY MASS INDEX: 28 KG/M2 | DIASTOLIC BLOOD PRESSURE: 68 MMHG | HEART RATE: 88 BPM | OXYGEN SATURATION: 90 %

## 2019-01-30 PROCEDURE — 99024 POSTOP FOLLOW-UP VISIT: CPT

## 2019-01-31 ENCOUNTER — RX RENEWAL (OUTPATIENT)
Age: 79
End: 2019-01-31

## 2019-01-31 NOTE — LETTER CLOSING
[Sincerely yours,] : Sincerely yours, [FreeTextEntry3] : Michael Morgan MD\par Physician in Chief\par Department of Radiation Medicine\par [facility] [place]\par [facility] [place]\par \par  of Radiation Medicine\par [facility]\par at  [facility]\par \par Radiation \par [facility]/\par [facility] [place]\par [address]\par [place], [place] [address]\par \par Tel: [phone]\par Fax: [phone]\par

## 2019-01-31 NOTE — PHYSICAL EXAM
[Normal] : no focal deficits [de-identified] : deferred [FreeTextEntry1] : deferred [de-identified] : deferred [de-identified] : modest tenderness of proximal right upper extremity withouit swelling or skin changes.

## 2019-01-31 NOTE — VITALS
[Maximal Pain Intensity: 4/10] : 4/10 [Least Pain Intensity: 1/10] : 1/10 [Pain Description/Quality: ___] : Pain description/quality: [unfilled] [Pain Duration: ___] : Pain duration: [unfilled] [Pain Location: ___] : Pain Location: [unfilled] [Pain Interferes with ADLs] : Pain interferes with activities of daily living. [Opioid] : opioid [80: Normal activity with effort; some signs or symptoms of disease.] : 80: Normal activity with effort; some signs or symptoms of disease.  [ECOG Performance Status: 1 - Restricted in physically strenuous activity but ambulatory and able to carry out work of a light or sedentary nature] : Performance Status: 1 - Restricted in physically strenuous activity but ambulatory and able to carry out work of a light or sedentary nature, e.g., light house work, office work

## 2019-01-31 NOTE — HISTORY OF PRESENT ILLNESS
[FreeTextEntry1] : This 78 year-old is being seen for post-treatment evaluation.  Completed radiation therapy to the right humerus (800 cGy) for radiographic progression of malignant bony metastasis.  He is also s/p radiation therapy (5,000 cGy) for [id number] adenocarcinoma of the right lung completed 8/16/17.  The patient follows with Dr. Deng and has been maintained on Opdivo.  Prior to radiation therapy to the humerus, he reported some numbness and increasing pain in his right arm and shoulder for which he wore a 12.5 microgram fentanyl transdermal patch and was taking oxycodone every 4 hours.  Mr. Gómez continues to wear Fentanyl patch 12 micrograms and takes oxycodone every 4 - 6 hours. However, he reports the pain is gradually lessening.

## 2019-01-31 NOTE — ASSESSMENT
[Metastatic disease with local control] : Metastatic disease with local control [FreeTextEntry1] : pain improved, but therapeutic response in evolution.

## 2019-01-31 NOTE — REVIEW OF SYSTEMS
[Joint Pain] : joint pain [Muscle Pain] : muscle pain [Negative] : Allergic/Immunologic [FreeTextEntry9] : right upper extremity symptoms diminished since palliative RT.

## 2019-02-04 ENCOUNTER — RESULT REVIEW (OUTPATIENT)
Age: 79
End: 2019-02-04

## 2019-02-04 ENCOUNTER — LABORATORY RESULT (OUTPATIENT)
Age: 79
End: 2019-02-04

## 2019-02-04 ENCOUNTER — APPOINTMENT (OUTPATIENT)
Age: 79
End: 2019-02-04

## 2019-02-04 LAB
BASOPHILS # BLD AUTO: 0 K/UL — SIGNIFICANT CHANGE UP (ref 0–0.2)
BASOPHILS NFR BLD AUTO: 0.6 % — SIGNIFICANT CHANGE UP (ref 0–2)
EOSINOPHIL # BLD AUTO: 0.2 K/UL — SIGNIFICANT CHANGE UP (ref 0–0.5)
EOSINOPHIL NFR BLD AUTO: 2.4 % — SIGNIFICANT CHANGE UP (ref 0–6)
HCT VFR BLD CALC: 33.9 % — LOW (ref 39–50)
HGB BLD-MCNC: 10.3 G/DL — LOW (ref 13–17)
LYMPHOCYTES # BLD AUTO: 1.8 K/UL — SIGNIFICANT CHANGE UP (ref 1–3.3)
LYMPHOCYTES # BLD AUTO: 20.9 % — SIGNIFICANT CHANGE UP (ref 13–44)
MCHC RBC-ENTMCNC: 26.3 PG — LOW (ref 27–34)
MCHC RBC-ENTMCNC: 30.4 G/DL — LOW (ref 32–36)
MCV RBC AUTO: 86.5 FL — SIGNIFICANT CHANGE UP (ref 80–100)
MONOCYTES # BLD AUTO: 0.6 K/UL — SIGNIFICANT CHANGE UP (ref 0–0.9)
MONOCYTES NFR BLD AUTO: 6.7 % — SIGNIFICANT CHANGE UP (ref 2–14)
NEUTROPHILS # BLD AUTO: 5.9 K/UL — SIGNIFICANT CHANGE UP (ref 1.8–7.4)
NEUTROPHILS NFR BLD AUTO: 69.5 % — SIGNIFICANT CHANGE UP (ref 43–77)
PLATELET # BLD AUTO: 275 K/UL — SIGNIFICANT CHANGE UP (ref 150–400)
RBC # BLD: 3.92 M/UL — LOW (ref 4.2–5.8)
RBC # FLD: 15.6 % — HIGH (ref 10.3–14.5)
WBC # BLD: 8.5 K/UL — SIGNIFICANT CHANGE UP (ref 3.8–10.5)
WBC # FLD AUTO: 8.5 K/UL — SIGNIFICANT CHANGE UP (ref 3.8–10.5)

## 2019-02-11 ENCOUNTER — APPOINTMENT (OUTPATIENT)
Dept: INTERNAL MEDICINE | Facility: CLINIC | Age: 79
End: 2019-02-11
Payer: MEDICARE

## 2019-02-11 DIAGNOSIS — Z51.11 ENCOUNTER FOR ANTINEOPLASTIC CHEMOTHERAPY: ICD-10-CM

## 2019-02-11 PROCEDURE — 36415 COLL VENOUS BLD VENIPUNCTURE: CPT

## 2019-02-12 LAB
25(OH)D3 SERPL-MCNC: 54.2 NG/ML
ALBUMIN SERPL ELPH-MCNC: 3.7 G/DL
ALP BLD-CCNC: 78 U/L
ALT SERPL-CCNC: 13 U/L
ANION GAP SERPL CALC-SCNC: 12 MMOL/L
AST SERPL-CCNC: 14 U/L
BASOPHILS # BLD AUTO: 0.01 K/UL
BASOPHILS NFR BLD AUTO: 0.1 %
BILIRUB SERPL-MCNC: 0.5 MG/DL
BUN SERPL-MCNC: 21 MG/DL
CALCIUM SERPL-MCNC: 9.3 MG/DL
CHLORIDE SERPL-SCNC: 104 MMOL/L
CHOLEST SERPL-MCNC: 126 MG/DL
CHOLEST/HDLC SERPL: 3.7 RATIO
CO2 SERPL-SCNC: 24 MMOL/L
CREAT SERPL-MCNC: 1.56 MG/DL
EOSINOPHIL # BLD AUTO: 0.14 K/UL
EOSINOPHIL NFR BLD AUTO: 1.9 %
GLUCOSE SERPL-MCNC: 71 MG/DL
HBA1C MFR BLD HPLC: 6.5 %
HCT VFR BLD CALC: 35 %
HDLC SERPL-MCNC: 34 MG/DL
HGB BLD-MCNC: 10.1 G/DL
IMM GRANULOCYTES NFR BLD AUTO: 0.4 %
LDLC SERPL CALC-MCNC: 62 MG/DL
LYMPHOCYTES # BLD AUTO: 2.34 K/UL
LYMPHOCYTES NFR BLD AUTO: 32.2 %
MAN DIFF?: NORMAL
MCHC RBC-ENTMCNC: 26.1 PG
MCHC RBC-ENTMCNC: 28.9 GM/DL
MCV RBC AUTO: 90.4 FL
MONOCYTES # BLD AUTO: 0.68 K/UL
MONOCYTES NFR BLD AUTO: 9.4 %
NEUTROPHILS # BLD AUTO: 4.06 K/UL
NEUTROPHILS NFR BLD AUTO: 56 %
PLATELET # BLD AUTO: 250 K/UL
POTASSIUM SERPL-SCNC: 5.1 MMOL/L
PROT SERPL-MCNC: 7 G/DL
PSA SERPL-MCNC: 1.17 NG/ML
RBC # BLD: 3.87 M/UL
RBC # FLD: 17.7 %
SODIUM SERPL-SCNC: 140 MMOL/L
T4 FREE SERPL-MCNC: 1 NG/DL
TRIGL SERPL-MCNC: 149 MG/DL
TSH SERPL-ACNC: 6.54 UIU/ML
WBC # FLD AUTO: 7.26 K/UL

## 2019-02-13 ENCOUNTER — RX RENEWAL (OUTPATIENT)
Age: 79
End: 2019-02-13

## 2019-02-15 ENCOUNTER — APPOINTMENT (OUTPATIENT)
Dept: INTERNAL MEDICINE | Facility: CLINIC | Age: 79
End: 2019-02-15
Payer: MEDICARE

## 2019-02-15 ENCOUNTER — NON-APPOINTMENT (OUTPATIENT)
Age: 79
End: 2019-02-15

## 2019-02-15 VITALS — DIASTOLIC BLOOD PRESSURE: 68 MMHG | SYSTOLIC BLOOD PRESSURE: 118 MMHG | RESPIRATION RATE: 12 BRPM | HEART RATE: 90 BPM

## 2019-02-15 VITALS — BODY MASS INDEX: 27.63 KG/M2 | WEIGHT: 193 LBS | HEIGHT: 70 IN

## 2019-02-15 DIAGNOSIS — E78.00 PURE HYPERCHOLESTEROLEMIA, UNSPECIFIED: ICD-10-CM

## 2019-02-15 DIAGNOSIS — Z00.00 ENCOUNTER FOR GENERAL ADULT MEDICAL EXAMINATION W/OUT ABNORMAL FINDINGS: ICD-10-CM

## 2019-02-15 PROCEDURE — G0438: CPT

## 2019-02-15 PROCEDURE — 93000 ELECTROCARDIOGRAM COMPLETE: CPT

## 2019-02-15 NOTE — ASSESSMENT
[FreeTextEntry1] : lower lantus to 25 \par lower simvastatin to 20\par increase thyroid med to 88\par follow up 6 weeks\par ekg ok\par appears ok mentally physically

## 2019-02-15 NOTE — HEALTH RISK ASSESSMENT
[Good] : ~his/her~  mood as  good [HIV test declined] : HIV test declined [Hepatitis C test declined] : Hepatitis C test declined [None] : None [Behavioral] : behavioral [With Significant Other] : lives with significant other [Retired] : retired [] :  [# Of Children ___] : has [unfilled] children [] : No [Change in mental status noted] : No change in mental status noted [Language] : denies difficulty with language [Behavior] : denies difficulty with behavior [Learning/Retaining New Information] : denies difficulty learning/retaining new information [Handling Complex Tasks] : denies difficulty handling complex tasks [Reasoning] : denies difficulty with reasoning [Spatial Ability and Orientation] : denies difficulty with spatial ability and orientation [Discussed at today's visit] : Advance Directives Discussed at today's visit [FreeTextEntry4] : wife is involved

## 2019-02-15 NOTE — HISTORY OF PRESENT ILLNESS
[FreeTextEntry1] : For CPE [de-identified] : For CPE\par currently has lung cacner\par patient has no chest pain sob\par he is on optiva monthly and for past 2 years has been ok\par he has bone mets and recieves rt\par

## 2019-02-20 ENCOUNTER — APPOINTMENT (OUTPATIENT)
Dept: HEMATOLOGY ONCOLOGY | Facility: CLINIC | Age: 79
End: 2019-02-20
Payer: MEDICARE

## 2019-02-20 VITALS
BODY MASS INDEX: 27.78 KG/M2 | DIASTOLIC BLOOD PRESSURE: 69 MMHG | SYSTOLIC BLOOD PRESSURE: 114 MMHG | HEIGHT: 70 IN | OXYGEN SATURATION: 93 % | HEART RATE: 84 BPM | WEIGHT: 194.02 LBS | TEMPERATURE: 98 F

## 2019-02-20 PROCEDURE — 99214 OFFICE O/P EST MOD 30 MIN: CPT

## 2019-02-21 ENCOUNTER — OUTPATIENT (OUTPATIENT)
Dept: OUTPATIENT SERVICES | Facility: HOSPITAL | Age: 79
LOS: 1 days | Discharge: HOME | End: 2019-02-21

## 2019-02-21 DIAGNOSIS — Z90.49 ACQUIRED ABSENCE OF OTHER SPECIFIED PARTS OF DIGESTIVE TRACT: Chronic | ICD-10-CM

## 2019-02-21 DIAGNOSIS — C34.90 MALIGNANT NEOPLASM OF UNSPECIFIED PART OF UNSPECIFIED BRONCHUS OR LUNG: ICD-10-CM

## 2019-02-21 DIAGNOSIS — C32.0 MALIGNANT NEOPLASM OF GLOTTIS: Chronic | ICD-10-CM

## 2019-02-21 DIAGNOSIS — Z96.659 PRESENCE OF UNSPECIFIED ARTIFICIAL KNEE JOINT: Chronic | ICD-10-CM

## 2019-02-21 DIAGNOSIS — D63.1 ANEMIA IN CHRONIC KIDNEY DISEASE: ICD-10-CM

## 2019-02-21 DIAGNOSIS — C79.51 SECONDARY MALIGNANT NEOPLASM OF BONE: ICD-10-CM

## 2019-02-21 NOTE — ASSESSMENT
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 Still in pain on Fentanyl 12 mcg, with neuropathic quality.\par \par Plan:\par -continue Nivolumab\par -increase Fentanyl patch dose to 25 mcg\par -start Neurontin 300 mg\par -follow up in 2 months\par

## 2019-02-21 NOTE — HISTORY OF PRESENT ILLNESS
[de-identified] : Mr. 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 Denny quit smoking in 1996. He is a retired  with asbestos exposure and pleural plaques on CXR.\par In August 2017 he completed 5000 cGy for a [id number] adenocarcinoma of the RUL lung. A PET scan in 12/17 showed the RUL nodule smaller and less PET-avid, but a new RUL nodule and new right paratracheal adenopathy were seen as well as a right proximal humerus lesion that was new.\par Bronchoscopy and endobronchial ultrasound could not confirm malignancy on cytopathology, but nuclear bone scan confirmed the humerus lesion as metastatic.\par \par He has been treated with Nivolumab immunotherapy, and has been tolerating well, but remains in pain. [de-identified] : He complains of nerve pain along his right arm. He describes it as a numbing pain that travels from his right shoulder to his right hand/fingers and also to his right back\par He mentions that his right hand has been cold, and that he has been avoiding using his right hand\par He denies any significant relief from Fentanyl patches, and mentions he is running out\par He states that radiation provided relief at first, but then the pain returned

## 2019-02-21 NOTE — ADDENDUM
[FreeTextEntry1] : All medical record entries made by shashi Farmer were at Dr. Leonel Deng's direction and personally dictated by me on (2/20/2019).

## 2019-02-21 NOTE — REVIEW OF SYSTEMS
[Negative] : Allergic/Immunologic [Fatigue] : fatigue [de-identified] : right-sided nerve pain of arm/hands/back

## 2019-02-28 ENCOUNTER — APPOINTMENT (OUTPATIENT)
Dept: RADIATION ONCOLOGY | Facility: CLINIC | Age: 79
End: 2019-02-28
Payer: MEDICARE

## 2019-02-28 VITALS
WEIGHT: 198 LBS | RESPIRATION RATE: 12 BRPM | HEIGHT: 70 IN | TEMPERATURE: 98 F | HEART RATE: 83 BPM | BODY MASS INDEX: 28.35 KG/M2 | OXYGEN SATURATION: 92 % | SYSTOLIC BLOOD PRESSURE: 120 MMHG | DIASTOLIC BLOOD PRESSURE: 68 MMHG

## 2019-02-28 DIAGNOSIS — Z92.3 PERSONAL HISTORY OF IRRADIATION: ICD-10-CM

## 2019-02-28 PROCEDURE — 99213 OFFICE O/P EST LOW 20 MIN: CPT

## 2019-02-28 NOTE — VITALS
[Maximal Pain Intensity: 4/10] : 4/10 [Least Pain Intensity: 1/10] : 1/10 [Pain Description/Quality: ___] : Pain description/quality: [unfilled] [Pain Duration: ___] : Pain duration: [unfilled] [Pain Location: ___] : Pain Location: [unfilled] [Pain Interferes with ADLs] : Pain interferes with activities of daily living. [Opioid] : opioid [Adjuvant (neuroleptics)] : adjuvant (neuroleptics) [80: Normal activity with effort; some signs or symptoms of disease.] : 80: Normal activity with effort; some signs or symptoms of disease.  [ECOG Performance Status: 1 - Restricted in physically strenuous activity but ambulatory and able to carry out work of a light or sedentary nature] : Performance Status: 1 - Restricted in physically strenuous activity but ambulatory and able to carry out work of a light or sedentary nature, e.g., light house work, office work

## 2019-03-01 PROBLEM — Z92.3 PERSONAL HISTORY OF RADIATION THERAPY: Status: ACTIVE | Noted: 2018-02-02

## 2019-03-01 NOTE — DISEASE MANAGEMENT
[Pathological] : TNM Stage: p [IV] : IV [TTNM] : 1 [NTNM] : 0 [MTNM] : 1 [de-identified] : 800 cGy [de-identified] : right humerus

## 2019-03-01 NOTE — ASSESSMENT
[Metastatic disease with local control] : Metastatic disease with local control [FreeTextEntry1] : pain improved, but complete therapeutic response in evolution.

## 2019-03-01 NOTE — HISTORY OF PRESENT ILLNESS
[FreeTextEntry1] : This 78 year-old is being seen for post-treatment evaluation.  Completed radiation therapy to the right humerus (800 cGy) for radiographic progression of malignant bony metastasis.  He is also s/p radiation therapy (5,000 cGy) for [id number] adenocarcinoma of the right lung completed 8/16/17.  The patient follows with Dr. Deng and has been maintained on Opdivo.  Prior to radiation therapy to the humerus, he reported some numbness and increasing pain in his right arm and shoulder for which he wore a 12.5 microgram fentanyl transdermal patch and was taking oxycodone every 4 hours.  Mr. Gómez continues to wear Fentanyl patch 12 micrograms and takes oxycodone every 4 - 6 hours. However, he reports the pain is significantly diminished,

## 2019-03-01 NOTE — PHYSICAL EXAM
[Normal] : oriented to person, place and time, the affect was normal, the mood was normal and not anxious [de-identified] : deferred [FreeTextEntry1] : deferred [de-identified] : deferred [de-identified] : modest tenderness of proximal right upper extremity without swelling or skin changes.

## 2019-03-01 NOTE — REVIEW OF SYSTEMS
[Joint Pain] : joint pain [Muscle Pain] : muscle pain [Negative] : Allergic/Immunologic [FreeTextEntry9] : right upper extremity symptoms of shooting pain slightly diminished since last visit.

## 2019-03-01 NOTE — LETTER CLOSING
[Sincerely yours,] : Sincerely yours, [FreeTextEntry3] : Michael Morgan MD\par Physician in Chief\par Department of Radiation Medicine\par [facility] [place]\par [facility] [place]\par \par  of Radiation Medicine\par [facility]\par at  \[facility]\""/[facility]\par \par Radiation \par [facility]/\par [facility] at [place]\par [address]\par [place], [place] [address]\par \par Tel: [phone]\par Fax: [phone]\par

## 2019-03-04 ENCOUNTER — RESULT REVIEW (OUTPATIENT)
Age: 79
End: 2019-03-04

## 2019-03-04 ENCOUNTER — LABORATORY RESULT (OUTPATIENT)
Age: 79
End: 2019-03-04

## 2019-03-04 ENCOUNTER — APPOINTMENT (OUTPATIENT)
Age: 79
End: 2019-03-04

## 2019-03-04 LAB
BASOPHILS # BLD AUTO: 0 K/UL — SIGNIFICANT CHANGE UP (ref 0–0.2)
BASOPHILS NFR BLD AUTO: 0.6 % — SIGNIFICANT CHANGE UP (ref 0–2)
EOSINOPHIL # BLD AUTO: 0.1 K/UL — SIGNIFICANT CHANGE UP (ref 0–0.5)
EOSINOPHIL NFR BLD AUTO: 0.7 % — SIGNIFICANT CHANGE UP (ref 0–6)
HCT VFR BLD CALC: 28.7 % — LOW (ref 39–50)
HGB BLD-MCNC: 8.8 G/DL — LOW (ref 13–17)
LYMPHOCYTES # BLD AUTO: 1.4 K/UL — SIGNIFICANT CHANGE UP (ref 1–3.3)
LYMPHOCYTES # BLD AUTO: 17.9 % — SIGNIFICANT CHANGE UP (ref 13–44)
MCHC RBC-ENTMCNC: 26.6 PG — LOW (ref 27–34)
MCHC RBC-ENTMCNC: 30.5 G/DL — LOW (ref 32–36)
MCV RBC AUTO: 87.2 FL — SIGNIFICANT CHANGE UP (ref 80–100)
MONOCYTES # BLD AUTO: 0.7 K/UL — SIGNIFICANT CHANGE UP (ref 0–0.9)
MONOCYTES NFR BLD AUTO: 8.4 % — SIGNIFICANT CHANGE UP (ref 2–14)
NEUTROPHILS # BLD AUTO: 5.8 K/UL — SIGNIFICANT CHANGE UP (ref 1.8–7.4)
NEUTROPHILS NFR BLD AUTO: 72.3 % — SIGNIFICANT CHANGE UP (ref 43–77)
PLATELET # BLD AUTO: 308 K/UL — SIGNIFICANT CHANGE UP (ref 150–400)
RBC # BLD: 3.29 M/UL — LOW (ref 4.2–5.8)
RBC # FLD: 17 % — HIGH (ref 10.3–14.5)
WBC # BLD: 8.1 K/UL — SIGNIFICANT CHANGE UP (ref 3.8–10.5)
WBC # FLD AUTO: 8.1 K/UL — SIGNIFICANT CHANGE UP (ref 3.8–10.5)

## 2019-03-05 DIAGNOSIS — N18.3 CHRONIC KIDNEY DISEASE, STAGE 3 (MODERATE): ICD-10-CM

## 2019-03-05 DIAGNOSIS — Z51.11 ENCOUNTER FOR ANTINEOPLASTIC CHEMOTHERAPY: ICD-10-CM

## 2019-03-08 ENCOUNTER — RX RENEWAL (OUTPATIENT)
Age: 79
End: 2019-03-08

## 2019-03-11 ENCOUNTER — APPOINTMENT (OUTPATIENT)
Dept: OPHTHALMOLOGY | Facility: CLINIC | Age: 79
End: 2019-03-11
Payer: MEDICARE

## 2019-03-11 DIAGNOSIS — H35.3132 NONEXUDATIVE AGE-RELATED MACULAR DEGENERATION, BILATERAL, INTERMEDIATE DRY STAGE: ICD-10-CM

## 2019-03-11 DIAGNOSIS — H25.13 AGE-RELATED NUCLEAR CATARACT, BILATERAL: ICD-10-CM

## 2019-03-11 PROCEDURE — 92134 CPTRZ OPH DX IMG PST SGM RTA: CPT

## 2019-03-11 PROCEDURE — 92014 COMPRE OPH EXAM EST PT 1/>: CPT

## 2019-03-13 ENCOUNTER — APPOINTMENT (OUTPATIENT)
Dept: HEMATOLOGY ONCOLOGY | Facility: CLINIC | Age: 79
End: 2019-03-13

## 2019-03-13 ENCOUNTER — FORM ENCOUNTER (OUTPATIENT)
Age: 79
End: 2019-03-13

## 2019-03-13 ENCOUNTER — APPOINTMENT (OUTPATIENT)
Age: 79
End: 2019-03-13

## 2019-03-13 NOTE — H&P PST ADULT - WEIGHT IN LBS
224.8 Closure 4 Information: This tab is for additional flaps and grafts above and beyond our usual structured repairs.  Please note if you enter information here it will not currently bill and you will need to add the billing information manually.

## 2019-03-14 ENCOUNTER — OUTPATIENT (OUTPATIENT)
Dept: OUTPATIENT SERVICES | Facility: HOSPITAL | Age: 79
LOS: 1 days | End: 2019-03-14

## 2019-03-14 ENCOUNTER — APPOINTMENT (OUTPATIENT)
Dept: NUCLEAR MEDICINE | Facility: CLINIC | Age: 79
End: 2019-03-14
Payer: MEDICARE

## 2019-03-14 DIAGNOSIS — C32.0 MALIGNANT NEOPLASM OF GLOTTIS: Chronic | ICD-10-CM

## 2019-03-14 DIAGNOSIS — Z90.49 ACQUIRED ABSENCE OF OTHER SPECIFIED PARTS OF DIGESTIVE TRACT: Chronic | ICD-10-CM

## 2019-03-14 DIAGNOSIS — Z96.659 PRESENCE OF UNSPECIFIED ARTIFICIAL KNEE JOINT: Chronic | ICD-10-CM

## 2019-03-14 DIAGNOSIS — C34.90 MALIGNANT NEOPLASM OF UNSPECIFIED PART OF UNSPECIFIED BRONCHUS OR LUNG: ICD-10-CM

## 2019-03-14 DIAGNOSIS — C34.11 MALIGNANT NEOPLASM OF UPPER LOBE, RIGHT BRONCHUS OR LUNG: ICD-10-CM

## 2019-03-14 PROCEDURE — 78815 PET IMAGE W/CT SKULL-THIGH: CPT | Mod: 26,PS

## 2019-03-20 ENCOUNTER — RX RENEWAL (OUTPATIENT)
Age: 79
End: 2019-03-20

## 2019-03-22 ENCOUNTER — TRANSCRIPTION ENCOUNTER (OUTPATIENT)
Age: 79
End: 2019-03-22

## 2019-03-22 NOTE — H&P PST ADULT - PROBLEM SELECTOR PLAN 2
[facility]    cc:         Michele Bae M.D.    PREOPERATIVE DIAGNOSES:  1. MICHELL-INCISIONAL UMBILICAL HERNIA.  2. RIGHT BREAST CANCER.    POSTOPERATIVE DIAGNOSES:  1. Michell-incisional umbilical hernia.  2. Right breast cancer.  3. Ascites.    PROCEDURE:  1. REPAIR OF INCISIONAL HERNIA, PRIMARILY AND ASPIRATION OF ASCITES.  2. RIGHT TOTAL MASTECTOMY WITH SENTINEL LYMPH NODE BIOPSY.    ANESTHESIA:  GENERAL.    SURGEON:  MICHELE BAE M.D.    ASSISTANT:  DR. OTERO, MEDICAL STUDENT.    DESCRIPTION OF PROCEDURE:  The patient is a 59-year-old male with a personal history of gastric cancer and cirrhosis and right breast cancer.  The patient is status post neoadjuvant therapy.  The patient with symptomatic incisional hernia.  The patient had time-out performed.  Antibiotics in place.  SCDs in place.  The patient was prepped and draped in accordance to procedure.  Initially, attention was placed to the incisional hernia.  Ioban drape has been draped has  been in place.  Skin incision was made.  Incision was carried through skin subcutaneous tissue.  The defect was approximately 3-4 cm.  Additional skin was opened on either side in order to get proper access safely.  The skin was quite thin.  It was carefully opened.  Hernia sac was identified.  Hernia sac was initially circumferentially dissected free in all directions until the fascia was encountered in all directions.  At this point, after  ascertaining that the hernia sac appeared to be relatively skeletonized, a small opening was made.  Significant ascitic fluid was noted.  A total of 2.5 L of ascitic fluid was aspirated.  Ascitic fluid was sent for cytology.  The hernia sac was excised, it too was sent for permanent section.  The defect was approximately 4 cm, but easily approximated.  Due to the ascites, it was decided that the patient would be better served with a primary  repair.  The fascia was cleaned in all directions both superiorly and  inferiorly.  No other defect was palpated both superiorly and inferiorly.  At this point, under direct vision with an interrupted mattress, 0 Ethibond sutures were placed.  After placement of all the sutures, there were carefully ligated.  Wound was irrigated.  Clear return was noted.  The subcutaneous tissue was approximated using interrupted 3-0 Vicryl.  Skin was closed  using 4-0 subcuticular Vicryl.  Steri-Strips and Tegaderm dressing was placed.  At this point, all the dressings and everything was placed, and a complete new prep and drape was carried out on the right breast.     Both superior and inferior flaps were created.  It should be noted the patient has a port on the right side.  The patient's chest wall is relatively thin.  The skin was carefully elevated off the breast tissue all the way superiorly, medially and inferiorly and laterally.  Initially, in the superior lateral plane, the clavipectoral fascia was carefully identified.  The clavipectoral fascia was carefully opened.  [place] lymph node was  identified.  There were a total of 2, which sent to pathology, where subsequently it was identified that no cancer was identified in either of the 2 lymph nodes.  In the meantime, the breast tissue was lifted off the chest wall including the pectoralis fascia.  The specimen was oriented with short stitch superior and long stitch laterally.  There was some additional piece of tissue, which was also taken off the chest wall and sent separately.   Wound was irrigated.  Clear return was noted.  Two LISA drains, 1 in the axilla laterally, and 1 in medial superiorly was placed.  Both were anchored using interrupted nylon.  The subcutaneous tissue was approximated using interrupted 3-0 Vicryl.  Skin was closed using 4-0 subcuticular Vicryl.  Sterile Steri-Strips, fluffy dressing was placed.  The patient tolerated the procedure and was transferred to recovery in satisfactory condition.      Michele Bae M.D.                        MRN#:  [id number]  Located within [facility]#:  [id number]  DATE OF SURGERY:  0  3/20/2019    ROOM:  16  SVC:  OBD  DICTATED BY: Michele Bae M.D.  DD: 03/21/2019 DT: 03/22/2019 TD: 19:57 TT: 00:52 K#: [id number]    REPORT OF OPERATION   continue medications as instructed. Asked the patient to take the Blood pressure medication/ heart medication on DOP.

## 2019-03-25 ENCOUNTER — RX RENEWAL (OUTPATIENT)
Age: 79
End: 2019-03-25

## 2019-03-25 RX ORDER — HYDROCODONE BITARTRATE AND ACETAMINOPHEN 5; 325 MG/1; MG/1
5-325 TABLET ORAL
Qty: 84 | Refills: 0 | Status: DISCONTINUED | COMMUNITY
Start: 2019-01-31 | End: 2019-03-25

## 2019-03-25 RX ORDER — FENTANYL 25 UG/H
25 PATCH, EXTENDED RELEASE TRANSDERMAL
Qty: 10 | Refills: 0 | Status: DISCONTINUED | COMMUNITY
Start: 2019-02-20 | End: 2019-03-25

## 2019-03-25 RX ORDER — HYDROCODONE BITARTRATE AND ACETAMINOPHEN 5; 325 MG/1; MG/1
5-325 TABLET ORAL
Qty: 84 | Refills: 0 | Status: DISCONTINUED | COMMUNITY
Start: 2019-01-16 | End: 2019-03-25

## 2019-03-25 RX ORDER — HYDROCODONE BITARTRATE AND ACETAMINOPHEN 5; 325 MG/1; MG/1
5-325 TABLET ORAL
Qty: 120 | Refills: 0 | Status: DISCONTINUED | COMMUNITY
Start: 2018-12-21 | End: 2019-03-25

## 2019-03-25 RX ORDER — FENTANYL 12 UG/H
12 PATCH, EXTENDED RELEASE TRANSDERMAL
Qty: 10 | Refills: 0 | Status: DISCONTINUED | COMMUNITY
Start: 2019-01-04 | End: 2019-03-25

## 2019-03-26 ENCOUNTER — APPOINTMENT (OUTPATIENT)
Dept: ORTHOPEDIC SURGERY | Facility: CLINIC | Age: 79
End: 2019-03-26

## 2019-03-28 ENCOUNTER — OUTPATIENT (OUTPATIENT)
Dept: OUTPATIENT SERVICES | Facility: HOSPITAL | Age: 79
LOS: 1 days | Discharge: HOME | End: 2019-03-28

## 2019-03-28 DIAGNOSIS — C34.90 MALIGNANT NEOPLASM OF UNSPECIFIED PART OF UNSPECIFIED BRONCHUS OR LUNG: ICD-10-CM

## 2019-03-28 DIAGNOSIS — Z96.659 PRESENCE OF UNSPECIFIED ARTIFICIAL KNEE JOINT: Chronic | ICD-10-CM

## 2019-03-28 DIAGNOSIS — C32.0 MALIGNANT NEOPLASM OF GLOTTIS: Chronic | ICD-10-CM

## 2019-03-28 DIAGNOSIS — Z90.49 ACQUIRED ABSENCE OF OTHER SPECIFIED PARTS OF DIGESTIVE TRACT: Chronic | ICD-10-CM

## 2019-04-03 ENCOUNTER — APPOINTMENT (OUTPATIENT)
Dept: INTERNAL MEDICINE | Facility: CLINIC | Age: 79
End: 2019-04-03
Payer: MEDICARE

## 2019-04-03 VITALS
SYSTOLIC BLOOD PRESSURE: 98 MMHG | RESPIRATION RATE: 16 BRPM | BODY MASS INDEX: 27.63 KG/M2 | HEIGHT: 70 IN | WEIGHT: 193 LBS | TEMPERATURE: 98.6 F | HEART RATE: 71 BPM | DIASTOLIC BLOOD PRESSURE: 54 MMHG

## 2019-04-03 PROCEDURE — 36415 COLL VENOUS BLD VENIPUNCTURE: CPT

## 2019-04-03 PROCEDURE — 99214 OFFICE O/P EST MOD 30 MIN: CPT | Mod: 25

## 2019-04-03 NOTE — ASSESSMENT
[FreeTextEntry1] : pneumonia possible \par obtain cxr\par start levaquin\par check basic labs\par a1c\par to see heme onc tomorrow for optiva\par patient is eating and taking po ok, currently afebrile but admission not required yet\par caner related pain may need pain med adjustment

## 2019-04-03 NOTE — PHYSICAL EXAM
[No JVD] : no jugular venous distention [No Respiratory Distress] : no respiratory distress  [Normal Rate] : normal rate  [de-identified] : frail looking today a bit slower than last visit [de-identified] : mild icterus [de-identified] : left congestion rhonchi

## 2019-04-03 NOTE — HISTORY OF PRESENT ILLNESS
[FreeTextEntry1] : for follow up \par has been not well for 2 days\par  [de-identified] : patient has been coughing and congested for past 2 days\par has some phlegm coming up from his lung\par he had mild epistaxis with sinus congestion\par and feels somewhat weaker than usual\par he is due for optiva tomorrow for known lung cancer\par he does not feel sob but he does have moderate paiin

## 2019-04-04 ENCOUNTER — LABORATORY RESULT (OUTPATIENT)
Age: 79
End: 2019-04-04

## 2019-04-04 ENCOUNTER — RX RENEWAL (OUTPATIENT)
Age: 79
End: 2019-04-04

## 2019-04-04 ENCOUNTER — APPOINTMENT (OUTPATIENT)
Age: 79
End: 2019-04-04

## 2019-04-04 ENCOUNTER — OUTPATIENT (OUTPATIENT)
Dept: OUTPATIENT SERVICES | Facility: HOSPITAL | Age: 79
LOS: 1 days | End: 2019-04-04
Payer: MEDICARE

## 2019-04-04 DIAGNOSIS — J18.9 PNEUMONIA, UNSPECIFIED ORGANISM: ICD-10-CM

## 2019-04-04 DIAGNOSIS — Z90.49 ACQUIRED ABSENCE OF OTHER SPECIFIED PARTS OF DIGESTIVE TRACT: Chronic | ICD-10-CM

## 2019-04-04 DIAGNOSIS — C32.0 MALIGNANT NEOPLASM OF GLOTTIS: Chronic | ICD-10-CM

## 2019-04-04 DIAGNOSIS — Z96.659 PRESENCE OF UNSPECIFIED ARTIFICIAL KNEE JOINT: Chronic | ICD-10-CM

## 2019-04-04 LAB
ALBUMIN SERPL ELPH-MCNC: 3 G/DL
ALP BLD-CCNC: 125 U/L
ALT SERPL-CCNC: 26 U/L
ANION GAP SERPL CALC-SCNC: 12 MMOL/L
AST SERPL-CCNC: 26 U/L
BASOPHILS # BLD AUTO: 0.02 K/UL
BASOPHILS NFR BLD AUTO: 0.2 %
BILIRUB SERPL-MCNC: 0.5 MG/DL
BUN SERPL-MCNC: 27 MG/DL
CALCIUM SERPL-MCNC: 8.8 MG/DL
CHLORIDE SERPL-SCNC: 100 MMOL/L
CO2 SERPL-SCNC: 20 MMOL/L
CREAT SERPL-MCNC: 1.41 MG/DL
EOSINOPHIL # BLD AUTO: 0.04 K/UL
EOSINOPHIL NFR BLD AUTO: 0.4 %
ESTIMATED AVERAGE GLUCOSE: 126 MG/DL
GLUCOSE SERPL-MCNC: 118 MG/DL
HBA1C MFR BLD HPLC: 6 %
HCT VFR BLD CALC: 30.4 %
HGB BLD-MCNC: 8.5 G/DL
IMM GRANULOCYTES NFR BLD AUTO: 0.6 %
LYMPHOCYTES # BLD AUTO: 1.35 K/UL
LYMPHOCYTES NFR BLD AUTO: 12.4 %
MAN DIFF?: NORMAL
MCHC RBC-ENTMCNC: 26.2 PG
MCHC RBC-ENTMCNC: 28 GM/DL
MCV RBC AUTO: 93.5 FL
MONOCYTES # BLD AUTO: 0.84 K/UL
MONOCYTES NFR BLD AUTO: 7.7 %
NEUTROPHILS # BLD AUTO: 8.59 K/UL
NEUTROPHILS NFR BLD AUTO: 78.7 %
PLATELET # BLD AUTO: 375 K/UL
POTASSIUM SERPL-SCNC: 5.6 MMOL/L
PROT SERPL-MCNC: 6.5 G/DL
RBC # BLD: 3.25 M/UL
RBC # FLD: 18.3 %
SODIUM SERPL-SCNC: 132 MMOL/L
WBC # FLD AUTO: 10.91 K/UL

## 2019-04-04 PROCEDURE — 71046 X-RAY EXAM CHEST 2 VIEWS: CPT | Mod: 26

## 2019-04-05 ENCOUNTER — APPOINTMENT (OUTPATIENT)
Dept: INTERNAL MEDICINE | Facility: CLINIC | Age: 79
End: 2019-04-05

## 2019-04-05 ENCOUNTER — APPOINTMENT (OUTPATIENT)
Dept: RADIATION ONCOLOGY | Facility: CLINIC | Age: 79
End: 2019-04-05

## 2019-04-05 ENCOUNTER — APPOINTMENT (OUTPATIENT)
Dept: RADIATION ONCOLOGY | Facility: CLINIC | Age: 79
End: 2019-04-05
Payer: MEDICARE

## 2019-04-05 VITALS
BODY MASS INDEX: 62.76 KG/M2 | WEIGHT: 315 LBS | DIASTOLIC BLOOD PRESSURE: 64 MMHG | SYSTOLIC BLOOD PRESSURE: 114 MMHG | HEART RATE: 71 BPM | OXYGEN SATURATION: 92 % | RESPIRATION RATE: 20 BRPM

## 2019-04-05 DIAGNOSIS — Z92.3 PERSONAL HISTORY OF IRRADIATION: ICD-10-CM

## 2019-04-05 PROCEDURE — 99214 OFFICE O/P EST MOD 30 MIN: CPT

## 2019-04-05 NOTE — HISTORY OF PRESENT ILLNESS
[FreeTextEntry1] : Denny Gómez is a 78 year-old man being seen in followup.  He completed radiation therapy to the right humerus (800 cGy) for radiographic progression of malignant bony metastasis in 1/2019. He is also s/p radiation therapy (5,000 cGy) for [id number] adenocarcinoma of the right lung completed 8/16/17. \par \par Pt here today due to increase in pain right humerus. He is now taking fentanyl patch 75 mcg/hr which started last night.  Wife reports slight improvement of right arm pain after prior radiation treatment.  Patient described returned of numbness along the forearm and right thumb/index fingers.\par \par Interim PET/CT on 3/14/19 which showed increased longitudinal extent of FDG-avid proximal right humeral diaphyseal metastasis with increased focal area of anterior cortical disruption and probable extraosseous soft tissue extension.

## 2019-04-05 NOTE — REASON FOR VISIT
[Lung Cancer] : lung cancer [Emergent Follow-Up] : an emergent follow-up visit for [Bone Metastasis] : bone metastasis [Spouse] : spouse

## 2019-04-05 NOTE — REVIEW OF SYSTEMS
[Constipation: Grade 0] : Constipation: Grade 0 [Nausea: Grade 0] : Nausea: Grade 0 [Vomiting: Grade 0] : Vomiting: Grade 0 [Fatigue: Grade 1 - Fatigue relieved by rest] : Fatigue: Grade 1 - Fatigue relieved by rest [Hematuria: Grade 0] : Hematuria: Grade 0 [Cough: Grade 1 - Mild symptoms; nonprescription intervention indicated] : Cough: Grade 1 - Mild symptoms; nonprescription intervention indicated [Dyspnea: Grade 0] : Dyspnea: Grade 0

## 2019-04-05 NOTE — VITALS
[Maximal Pain Intensity: 10/10] : 10/10 [Least Pain Intensity: 2/10] : 2/10 [Pain Description/Quality: ___] : Pain description/quality: [unfilled] [Pain Duration: ___] : Pain duration: [unfilled] [Pain Location: ___] : Pain Location: [unfilled] [Pain Interferes with ADLs] : Pain interferes with activities of daily living. [Opioid] : opioid [70: Cares for self; unalbe to carry on normal activity or do active work.] : 70: Cares for self; unable to carry on normal activity or do active work. [ECOG Performance Status: 2 - Ambulatory and capable of all self care but unable to carry out any work activities] : Performance Status: 2 - Ambulatory and capable of all self care but unable to carry out any work activities. Up and about more than 50% of waking hours

## 2019-04-05 NOTE — PHYSICAL EXAM
[Extraocular Movements] : extraocular movements were intact [] : no respiratory distress [Normal] : oriented to person, place and time, the affect was normal, the mood was normal and not anxious [de-identified] : decreased left base, with rhonchi [de-identified] : right shoulder ROM limited by pain, particularly abduction; LUE movements 5/5

## 2019-04-05 NOTE — DISEASE MANAGEMENT
[Clinical] : TNM Stage: c [IV] : IV [FreeTextEntry4] : adenocarcinoma right lung [TTNM] : 1b [NTNM] : - [MTNM] : 1

## 2019-04-08 ENCOUNTER — FORM ENCOUNTER (OUTPATIENT)
Age: 79
End: 2019-04-08

## 2019-04-08 PROCEDURE — 77334 RADIATION TREATMENT AID(S): CPT | Mod: 26

## 2019-04-08 PROCEDURE — 77290 THER RAD SIMULAJ FIELD CPLX: CPT | Mod: 26

## 2019-04-09 ENCOUNTER — OUTPATIENT (OUTPATIENT)
Dept: OUTPATIENT SERVICES | Facility: HOSPITAL | Age: 79
LOS: 1 days | End: 2019-04-09

## 2019-04-09 ENCOUNTER — MEDICATION RENEWAL (OUTPATIENT)
Age: 79
End: 2019-04-09

## 2019-04-09 ENCOUNTER — APPOINTMENT (OUTPATIENT)
Dept: CT IMAGING | Facility: CLINIC | Age: 79
End: 2019-04-09
Payer: MEDICARE

## 2019-04-09 DIAGNOSIS — C34.91 MALIGNANT NEOPLASM OF UNSPECIFIED PART OF RIGHT BRONCHUS OR LUNG: ICD-10-CM

## 2019-04-09 DIAGNOSIS — Z90.49 ACQUIRED ABSENCE OF OTHER SPECIFIED PARTS OF DIGESTIVE TRACT: Chronic | ICD-10-CM

## 2019-04-09 DIAGNOSIS — Z96.659 PRESENCE OF UNSPECIFIED ARTIFICIAL KNEE JOINT: Chronic | ICD-10-CM

## 2019-04-09 DIAGNOSIS — C32.0 MALIGNANT NEOPLASM OF GLOTTIS: Chronic | ICD-10-CM

## 2019-04-09 PROCEDURE — 71250 CT THORAX DX C-: CPT | Mod: 26

## 2019-04-09 PROCEDURE — 77307 TELETHX ISODOSE PLAN CPLX: CPT | Mod: 26

## 2019-04-09 PROCEDURE — 77334 RADIATION TREATMENT AID(S): CPT | Mod: 26

## 2019-04-10 ENCOUNTER — APPOINTMENT (OUTPATIENT)
Dept: HEMATOLOGY ONCOLOGY | Facility: CLINIC | Age: 79
End: 2019-04-10

## 2019-04-10 PROCEDURE — 77280 THER RAD SIMULAJ FIELD SMPL: CPT | Mod: 26

## 2019-04-10 PROCEDURE — 77262 THER RADIOLOGY TX PLNG INTRM: CPT

## 2019-04-11 ENCOUNTER — RESULT REVIEW (OUTPATIENT)
Age: 79
End: 2019-04-11

## 2019-04-11 ENCOUNTER — LABORATORY RESULT (OUTPATIENT)
Age: 79
End: 2019-04-11

## 2019-04-11 ENCOUNTER — APPOINTMENT (OUTPATIENT)
Age: 79
End: 2019-04-11

## 2019-04-11 VITALS
DIASTOLIC BLOOD PRESSURE: 70 MMHG | BODY MASS INDEX: 28.92 KG/M2 | TEMPERATURE: 98.2 F | SYSTOLIC BLOOD PRESSURE: 156 MMHG | HEIGHT: 70 IN | OXYGEN SATURATION: 94 % | RESPIRATION RATE: 20 BRPM | HEART RATE: 84 BPM | WEIGHT: 202 LBS

## 2019-04-11 LAB
BASOPHILS # BLD AUTO: 0.1 K/UL — SIGNIFICANT CHANGE UP (ref 0–0.2)
BASOPHILS NFR BLD AUTO: 0.4 % — SIGNIFICANT CHANGE UP (ref 0–2)
EOSINOPHIL # BLD AUTO: 0.1 K/UL — SIGNIFICANT CHANGE UP (ref 0–0.5)
EOSINOPHIL NFR BLD AUTO: 0.8 % — SIGNIFICANT CHANGE UP (ref 0–6)
HCT VFR BLD CALC: 27.3 % — LOW (ref 39–50)
HGB BLD-MCNC: 8.2 G/DL — LOW (ref 13–17)
LYMPHOCYTES # BLD AUTO: 1 K/UL — SIGNIFICANT CHANGE UP (ref 1–3.3)
LYMPHOCYTES # BLD AUTO: 8.2 % — LOW (ref 13–44)
MCHC RBC-ENTMCNC: 26.4 PG — LOW (ref 27–34)
MCHC RBC-ENTMCNC: 30.2 G/DL — LOW (ref 32–36)
MCV RBC AUTO: 87.2 FL — SIGNIFICANT CHANGE UP (ref 80–100)
MONOCYTES # BLD AUTO: 0.9 K/UL — SIGNIFICANT CHANGE UP (ref 0–0.9)
MONOCYTES NFR BLD AUTO: 7.1 % — SIGNIFICANT CHANGE UP (ref 2–14)
NEUTROPHILS # BLD AUTO: 10.3 K/UL — HIGH (ref 1.8–7.4)
NEUTROPHILS NFR BLD AUTO: 83.5 % — HIGH (ref 43–77)
PLAT MORPH BLD: NORMAL — SIGNIFICANT CHANGE UP
PLATELET # BLD AUTO: 365 K/UL — SIGNIFICANT CHANGE UP (ref 150–400)
RBC # BLD: 3.12 M/UL — LOW (ref 4.2–5.8)
RBC # FLD: 17.1 % — HIGH (ref 10.3–14.5)
RBC BLD AUTO: SIGNIFICANT CHANGE UP
WBC # BLD: 12.3 K/UL — HIGH (ref 3.8–10.5)
WBC # FLD AUTO: 12.3 K/UL — HIGH (ref 3.8–10.5)

## 2019-04-11 RX ORDER — AMOXICILLIN 875 MG/1
875 TABLET, FILM COATED ORAL
Qty: 20 | Refills: 0 | Status: COMPLETED | COMMUNITY
Start: 2019-01-26

## 2019-04-11 NOTE — VITALS
[Least Pain Intensity: 5/10] : 5/10 [Maximal Pain Intensity: 10/10] : 10/10 [Pain Duration: ___] : Pain duration: [unfilled] [Pain Location: ___] : Pain Location: [unfilled] [Pain Description/Quality: ___] : Pain description/quality: [unfilled] [Opioid] : opioid [Pain Interferes with ADLs] : Pain interferes with activities of daily living. [70: Cares for self; unalbe to carry on normal activity or do active work.] : 70: Cares for self; unable to carry on normal activity or do active work.

## 2019-04-11 NOTE — REVIEW OF SYSTEMS
[Constipation: Grade 1 - Occasional or intermittent symptoms; occasional use of stool softeners, laxatives, dietary modification, or enema] : Constipation: Grade 1 - Occasional or intermittent symptoms; occasional use of stool softeners, laxatives, dietary modification, or enema [Edema Limbs: Grade 0] : Edema Limbs: Grade 0  [Localized Edema: Grade 1 - Localized to dependent areas, no disability or functional impairment] : Localized Edema: Grade 1 - Localized to dependent areas, no disability or functional impairment [Fatigue: Grade 1 - Fatigue relieved by rest] : Fatigue: Grade 1 - Fatigue relieved by rest [Neck Edema: Grade 0] : Neck Edema: Grade 0 [Lethargy: Grade 1 - Mild symptoms; reduced alertness and awareness] : Lethargy: Grade 1 - Mild symptoms; reduced alertness and awareness [Skin Hyperpigmentation: Grade 0] : Skin Hyperpigmentation: Grade 0 [Pruritus: Grade 0] : Pruritus: Grade 0 [Skin Induration: Grade 0] : Skin Induration: Grade 0 [Skin Atrophy: Grade 0] : Skin Atrophy: Grade 0 [Dermatitis Radiation: Grade 0] : Dermatitis Radiation: Grade 0

## 2019-04-11 NOTE — PHYSICAL EXAM
[Normal] : normal heart rate and rhythm, normal S1 and S2, and no murmurs present [de-identified] : decreased but clear

## 2019-04-11 NOTE — DISEASE MANAGEMENT
[Clinical] : TNM Stage: c [IV] : IV [FreeTextEntry4] : metastatic adenocarcinoma of the lung to bone [TTNM] : 1b [NTNM] : 0 [de-identified] : 542 [MTNM] : 1 [de-identified] : 2000 [de-identified] : right humerus

## 2019-04-11 NOTE — REASON FOR VISIT
[Routine On-Treatment] : a routine on-treatment visit for [Bone Metastasis] : bone metastasis [Lung Cancer] : lung cancer [Spouse] : spouse

## 2019-04-12 ENCOUNTER — RX RENEWAL (OUTPATIENT)
Age: 79
End: 2019-04-12

## 2019-04-12 DIAGNOSIS — C79.51 SECONDARY MALIGNANT NEOPLASM OF BONE: ICD-10-CM

## 2019-04-12 DIAGNOSIS — D63.1 ANEMIA IN CHRONIC KIDNEY DISEASE: ICD-10-CM

## 2019-04-12 DIAGNOSIS — Z51.11 ENCOUNTER FOR ANTINEOPLASTIC CHEMOTHERAPY: ICD-10-CM

## 2019-04-12 DIAGNOSIS — N18.9 CHRONIC KIDNEY DISEASE, UNSPECIFIED: ICD-10-CM

## 2019-04-15 ENCOUNTER — CHART COPY (OUTPATIENT)
Age: 79
End: 2019-04-15

## 2019-04-16 ENCOUNTER — APPOINTMENT (OUTPATIENT)
Dept: INTERNAL MEDICINE | Facility: CLINIC | Age: 79
End: 2019-04-16
Payer: MEDICARE

## 2019-04-16 VITALS
SYSTOLIC BLOOD PRESSURE: 138 MMHG | HEART RATE: 76 BPM | DIASTOLIC BLOOD PRESSURE: 76 MMHG | HEIGHT: 70 IN | BODY MASS INDEX: 28.92 KG/M2 | WEIGHT: 202 LBS | RESPIRATION RATE: 12 BRPM

## 2019-04-16 DIAGNOSIS — R60.0 LOCALIZED EDEMA: ICD-10-CM

## 2019-04-16 PROCEDURE — 99214 OFFICE O/P EST MOD 30 MIN: CPT

## 2019-04-16 PROCEDURE — 77427 RADIATION TX MANAGEMENT X5: CPT

## 2019-04-16 RX ORDER — LEVOFLOXACIN 500 MG/1
500 TABLET, FILM COATED ORAL DAILY
Qty: 10 | Refills: 0 | Status: DISCONTINUED | COMMUNITY
Start: 2019-04-03 | End: 2019-04-16

## 2019-04-16 NOTE — PHYSICAL EXAM
[No Acute Distress] : no acute distress [Well-Appearing] : well-appearing [Well Developed] : well developed [Well Nourished] : well nourished [Normal Sclera/Conjunctiva] : normal sclera/conjunctiva [PERRL] : pupils equal round and reactive to light [Normal Oropharynx] : the oropharynx was normal [EOMI] : extraocular movements intact [Normal Outer Ear/Nose] : the outer ears and nose were normal in appearance [No JVD] : no jugular venous distention [No Lymphadenopathy] : no lymphadenopathy [Supple] : supple [Thyroid Normal, No Nodules] : the thyroid was normal and there were no nodules present [No Respiratory Distress] : no respiratory distress  [No Accessory Muscle Use] : no accessory muscle use [Clear to Auscultation] : lungs were clear to auscultation bilaterally [Normal Rate] : normal rate  [Regular Rhythm] : with a regular rhythm [Normal S1, S2] : normal S1 and S2 [No Murmur] : no murmur heard [No Carotid Bruits] : no carotid bruits [No Abdominal Bruit] : a ~M bruit was not heard ~T in the abdomen [No Varicosities] : no varicosities [Pedal Pulses Present] : the pedal pulses are present [No Edema] : there was no peripheral edema [No Extremity Clubbing/Cyanosis] : no extremity clubbing/cyanosis [Soft] : abdomen soft [No Palpable Aorta] : no palpable aorta [Non Tender] : non-tender [Non-distended] : non-distended [Normal Bowel Sounds] : normal bowel sounds [No HSM] : no HSM [No Masses] : no abdominal mass palpated [Normal Anterior Cervical Nodes] : no anterior cervical lymphadenopathy [Normal Posterior Cervical Nodes] : no posterior cervical lymphadenopathy [No Spinal Tenderness] : no spinal tenderness [No CVA Tenderness] : no CVA  tenderness [No Joint Swelling] : no joint swelling [Grossly Normal Strength/Tone] : grossly normal strength/tone [No Rash] : no rash [Normal Gait] : normal gait [Coordination Grossly Intact] : coordination grossly intact [No Focal Deficits] : no focal deficits [Deep Tendon Reflexes (DTR)] : deep tendon reflexes were 2+ and symmetric [Normal Affect] : the affect was normal [de-identified] : edema both legs [Normal Insight/Judgement] : insight and judgment were intact

## 2019-04-16 NOTE — ASSESSMENT
[FreeTextEntry1] : pneumonia improved\par completing RT to the bone\par aspirin for the trip baby \par lung cancer heme onc followup\par pain moderate lukas need to attempt better control\par in future\par lasix for the swelling potassium is 6.2 so should tolerate

## 2019-04-16 NOTE — HISTORY OF PRESENT ILLNESS
[de-identified] : follow up pneumonia\par patient coughing less\par is breathing ok\par is getting RT for mets to the arm\par he has leg swelling today\par is planning a trip to [place]\par  [FreeTextEntry1] : follow up pneumonia

## 2019-04-17 DIAGNOSIS — N18.3 CHRONIC KIDNEY DISEASE, STAGE 3 (MODERATE): ICD-10-CM

## 2019-04-25 ENCOUNTER — RX RENEWAL (OUTPATIENT)
Age: 79
End: 2019-04-25

## 2019-04-25 RX ORDER — FENTANYL 25 UG/H
25 PATCH, EXTENDED RELEASE TRANSDERMAL
Qty: 10 | Refills: 0 | Status: DISCONTINUED | COMMUNITY
Start: 2019-04-04 | End: 2019-04-25

## 2019-04-25 RX ORDER — HYDROCODONE BITARTRATE AND ACETAMINOPHEN 5; 325 MG/1; MG/1
5-325 TABLET ORAL
Qty: 84 | Refills: 0 | Status: DISCONTINUED | COMMUNITY
Start: 2019-03-08 | End: 2019-04-25

## 2019-04-25 RX ORDER — FENTANYL 50 UG/H
50 PATCH, EXTENDED RELEASE TRANSDERMAL
Qty: 10 | Refills: 0 | Status: DISCONTINUED | COMMUNITY
Start: 2019-03-20 | End: 2019-04-25

## 2019-04-25 RX ORDER — HYDROCODONE BITARTRATE AND ACETAMINOPHEN 5; 325 MG/1; MG/1
5-325 TABLET ORAL
Qty: 84 | Refills: 0 | Status: DISCONTINUED | COMMUNITY
Start: 2019-03-20 | End: 2019-04-25

## 2019-05-02 ENCOUNTER — APPOINTMENT (OUTPATIENT)
Dept: HEMATOLOGY ONCOLOGY | Facility: CLINIC | Age: 79
End: 2019-05-02

## 2019-05-02 NOTE — ASU PATIENT PROFILE, ADULT - PATIENT REPRESENTATIVE NAME
Hospitalist Progress Note    Patient:  Rishabh Lynn      Unit/Bed:7K-10/010-A    YOB: 1944    MRN: [id number]       Acct: [de-identified]     PCP: Macarena Medrano DO    Date of Admission: 4/29/2019    Chief Complaint: low hemoglobin at nursing home with bloody stools     Hospital Course:  66 y. o. female who presented to [facility] from her nursing home 2/2 to having a low Hgb today. Annmarie Pereyra is a very poor historian and can not obtain any information from her.  Per nurse, pt had a low hemoglobin of 6.8 at her nursing home and some bloody stools at her nursing home and was sent to the ED for evaluation. Pt has pmh of CVA and Atrial Fib on ASA and coumadin.      Hemoglobin found to be 6.6. Transfused 2U pRBCs on 4/30. Fe and Ferritin found to be extremely low (18 and 3). Given IV Fe. Colonoscopy on 5/1 showing no active bleeding, just internal hemorrhoids which was likely the source       Subjective: no acute events overnight. No melena or BRBPR.        Medications:  Reviewed    Infusion Medications    dextrose       Scheduled Medications    hydrocortisone  25 mg Rectal BID    ferrous sulfate  325 mg Oral BID WC    warfarin (COUMADIN) daily dosing (placeholder)   Other RX Placeholder    warfarin  5.5 mg Oral Once    hydrALAZINE  25 mg Oral 3 times per day    insulin glargine  18 Units Subcutaneous Nightly    citalopram  10 mg Oral Daily    oxybutynin  10 mg Oral Daily    simvastatin  20 mg Oral Nightly    insulin lispro  0-6 Units Subcutaneous TID WC    insulin lispro  0-3 Units Subcutaneous Nightly    sodium chloride flush  10 mL Intravenous 2 times per day    amLODIPine  10 mg Oral Daily    And    lisinopril  20 mg Oral Daily     PRN Meds: sodium chloride flush, acetaminophen, ondansetron, glucose, dextrose, glucagon (rDNA), dextrose      Intake/Output Summary (Last 24 hours) at 5/2/2019 1141  Last data filed at 5/2/2019 0559  Gross per 24 hour   Intake 2564.27 ml Output 1150 ml   Net 1414.27 ml       Diet:  DIET CARB CONTROL; High Fiber    Exam:  /60   Pulse 79   Temp 98.7 °F (37.1 °C) (Oral)   Resp 16   Ht 5' 2\" (1.575 m)   Wt 229 lb (103.9 kg)   LMP  (Exact Date)   SpO2 92%   BMI 41.88 kg/m²     General appearance: No apparent distress, appears stated age and cooperative. HEENT: Pupils equal, round, and reactive to light. Conjunctivae/corneas clear. Neck: Supple, with full range of motion. No jugular venous distention. Trachea midline. Respiratory:  Normal respiratory effort. Clear to auscultation, bilaterally without Rales/Wheezes/Rhonchi. Cardiovascular: Regular rate and rhythm with normal S1/S2 without murmurs, rubs or gallops. Abdomen: Soft, non-tender, non-distended with normal bowel sounds. Musculoskeletal: passive and active ROM x 4 extremities. Skin: Skin color, texture, turgor normal.  No rashes or lesions. Neurologic:  Neurovascularly intact without any focal sensory/motor deficits. Cranial nerves: II-XII intact, grossly non-focal.  Psychiatric: Alert and oriented, thought content appropriate, normal insight  Capillary Refill: Brisk,< 3 seconds   Peripheral Pulses: +2 palpable, equal bilaterally       Labs:   Recent Labs     04/29/19  1552  04/30/19  0344  05/01/19  0729 05/01/19 1958 05/02/19 0719   WBC 13.0*  --  11.2*  --   --   --   --    HGB 7.2*   < > 6.6*   < > 8.7* 9.0* 8.9*   HCT 25.4*   < > 23.7*   < > 29.8* 30.5* 31.5*   *  --  467*  --   --   --   --     < > = values in this interval not displayed.      Recent Labs     04/29/19  1552 04/30/19 0344 05/02/19 0719    139 140   K 4.7 4.1 4.3    105 107   CO2 26 22* 25   BUN 21 17 12   CREATININE 1.2 1.0 0.9   CALCIUM 8.3* 7.9* 8.2*     Recent Labs     04/29/19  1552   AST 9   ALT 8*   BILITOT <0.2*   ALKPHOS 67     Recent Labs     04/29/19  1552 05/01/19  0729 05/02/19  0834   INR 2.17* 1.38* 1.24*     No results for input(s): Terry Coffey in the last 72 hours.    Urinalysis:      Lab Results   Component Value Date    NITRU NEGATIVE 09/06/2018    WBCUA 10-15 09/06/2018    WBCUA 2-4 11/29/2011    BACTERIA MODERATE 09/06/2018    RBCUA 0-2 09/06/2018    BLOODU NEGATIVE 09/06/2018    SPECGRAV 1.030 09/06/2018    GLUCOSEU 100 06/01/2016       Radiology:  XR Acute Abd Series Chest 1 VW   Final Result   1. No evidence of acute cardiopulmonary process. 2. Nonspecific, nonobstructive bowel gas pattern. **This report has been created using voice recognition software. It may contain minor errors which are inherent in voice recognition technology. **      Final report electronically signed by Dr. Simon Stevens MD on 4/29/2019 4:50 PM          Diet: DIET CARB CONTROL; High Fiber    DVT prophylaxis: [] Lovenox                                 [] SCDs                                 [] SQ Heparin                                 [] Encourage ambulation           [x] Already on Anticoagulation     Disposition:    [] Home       [] TCU       [] Rehab       [] Psych       [x] SNF       [] [place]       [] Other-    Code Status: Full Code    PT/OT Eval Status: ECF     Assessment/Plan:    Anticipated Discharge in : tomorrow if Hb is stable     Active Hospital Problems    Diagnosis Date Noted    GIB (gastrointestinal bleeding) [K92.2] 04/29/2019             Assessment/Plan:     1) Acute Blood Loss Anemia 2/2 to GIB bleed  - H/H dropped down to 6.6/23.7 --> transfused 2U on 4/30.   - Hemoglobin has been stable over x 48 hours  - Colonoscopy on 5/1 showing no active bleeding, just internal hemorrhoids which was likely the source. Start Anusol suppository BID. - Resumed coumadin on 5/2. Hold off on ASA for now. - monitor overnight for any recurrence of bleeding while on coumadin.    2) DM2  - Hold oral agent  - Continue with lantus but at a lower dose (18 units. ..she takes 24 units usually)   - Continue on SSI     3) Depression  - Continue with celexa     4) HTN  - Continue with lotrel with holding parameters     5) Overactive bladder  - Continue with ditropan     6) H/X CVA with expressive aphasia/A fib  - Hold ASA and coumadin for now  - Continue with statin     7) Iron Def Anemia  - Fe 18 and Ferritin 3  - GI is treated with IV Venofir on 4/30   - will need to be on Ferrous Sulfate 325 BID         Dispo: resumed coumadin today (5/2). If by tomorrow no bleeding or drop in hemoglobin discharge to nursing home.     Electronically signed by Cheli Pagan MD on 5/2/2019 at 11:41 AM Lidya Gómez

## 2019-05-07 ENCOUNTER — OUTPATIENT (OUTPATIENT)
Dept: OUTPATIENT SERVICES | Facility: HOSPITAL | Age: 79
LOS: 1 days | Discharge: HOME | End: 2019-05-07

## 2019-05-07 DIAGNOSIS — D63.1 ANEMIA IN CHRONIC KIDNEY DISEASE: ICD-10-CM

## 2019-05-07 DIAGNOSIS — C79.51 SECONDARY MALIGNANT NEOPLASM OF BONE: ICD-10-CM

## 2019-05-07 DIAGNOSIS — Z96.659 PRESENCE OF UNSPECIFIED ARTIFICIAL KNEE JOINT: Chronic | ICD-10-CM

## 2019-05-07 DIAGNOSIS — N18.3 CHRONIC KIDNEY DISEASE, STAGE 3 (MODERATE): ICD-10-CM

## 2019-05-07 DIAGNOSIS — C32.0 MALIGNANT NEOPLASM OF GLOTTIS: Chronic | ICD-10-CM

## 2019-05-07 DIAGNOSIS — Z90.49 ACQUIRED ABSENCE OF OTHER SPECIFIED PARTS OF DIGESTIVE TRACT: Chronic | ICD-10-CM

## 2019-05-07 DIAGNOSIS — C34.90 MALIGNANT NEOPLASM OF UNSPECIFIED PART OF UNSPECIFIED BRONCHUS OR LUNG: ICD-10-CM

## 2019-05-09 ENCOUNTER — LABORATORY RESULT (OUTPATIENT)
Age: 79
End: 2019-05-09

## 2019-05-09 ENCOUNTER — RESULT REVIEW (OUTPATIENT)
Age: 79
End: 2019-05-09

## 2019-05-09 ENCOUNTER — APPOINTMENT (OUTPATIENT)
Dept: HEMATOLOGY ONCOLOGY | Facility: CLINIC | Age: 79
End: 2019-05-09
Payer: MEDICARE

## 2019-05-09 ENCOUNTER — APPOINTMENT (OUTPATIENT)
Age: 79
End: 2019-05-09

## 2019-05-09 VITALS
TEMPERATURE: 98.2 F | BODY MASS INDEX: 27.53 KG/M2 | SYSTOLIC BLOOD PRESSURE: 122 MMHG | HEIGHT: 70 IN | HEART RATE: 76 BPM | OXYGEN SATURATION: 96 % | DIASTOLIC BLOOD PRESSURE: 71 MMHG | WEIGHT: 192.31 LBS

## 2019-05-09 LAB
BASOPHILS # BLD AUTO: 0 K/UL — SIGNIFICANT CHANGE UP (ref 0–0.2)
BASOPHILS NFR BLD AUTO: 0.6 % — SIGNIFICANT CHANGE UP (ref 0–2)
EOSINOPHIL # BLD AUTO: 0.1 K/UL — SIGNIFICANT CHANGE UP (ref 0–0.5)
EOSINOPHIL NFR BLD AUTO: 1.6 % — SIGNIFICANT CHANGE UP (ref 0–6)
HCT VFR BLD CALC: 29.6 % — LOW (ref 39–50)
HGB BLD-MCNC: 8.6 G/DL — LOW (ref 13–17)
LYMPHOCYTES # BLD AUTO: 1.1 K/UL — SIGNIFICANT CHANGE UP (ref 1–3.3)
LYMPHOCYTES # BLD AUTO: 16.5 % — SIGNIFICANT CHANGE UP (ref 13–44)
MCHC RBC-ENTMCNC: 25.6 PG — LOW (ref 27–34)
MCHC RBC-ENTMCNC: 29 G/DL — LOW (ref 32–36)
MCV RBC AUTO: 88.3 FL — SIGNIFICANT CHANGE UP (ref 80–100)
MONOCYTES # BLD AUTO: 0.6 K/UL — SIGNIFICANT CHANGE UP (ref 0–0.9)
MONOCYTES NFR BLD AUTO: 8.5 % — SIGNIFICANT CHANGE UP (ref 2–14)
NEUTROPHILS # BLD AUTO: 4.8 K/UL — SIGNIFICANT CHANGE UP (ref 1.8–7.4)
NEUTROPHILS NFR BLD AUTO: 73 % — SIGNIFICANT CHANGE UP (ref 43–77)
PLATELET # BLD AUTO: 319 K/UL — SIGNIFICANT CHANGE UP (ref 150–400)
RBC # BLD: 3.36 M/UL — LOW (ref 4.2–5.8)
RBC # FLD: 17.1 % — HIGH (ref 10.3–14.5)
WBC # BLD: 6.5 K/UL — SIGNIFICANT CHANGE UP (ref 3.8–10.5)
WBC # FLD AUTO: 6.5 K/UL — SIGNIFICANT CHANGE UP (ref 3.8–10.5)

## 2019-05-09 PROCEDURE — 99214 OFFICE O/P EST MOD 30 MIN: CPT

## 2019-05-10 NOTE — ADDENDUM
[FreeTextEntry1] : All medical record entries made by shashi Farmer were at Dr. Leonel Deng's direction and personally dictated by me on (5/9/2019).

## 2019-05-10 NOTE — ASSESSMENT
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 On Nivolumab immunotherapy and getting RT to right humerus with Dr. Ellison. On Fentanyl and Hydrocodone, but still in pain.\par \par Plan:\par -Continue Nivolumab\par -Referred to Dr. Miranda for pain management\par -Repeat PET/CT in June\par -Follow up based on treatment schedule \par

## 2019-05-14 ENCOUNTER — APPOINTMENT (OUTPATIENT)
Dept: PULMONOLOGY | Facility: CLINIC | Age: 79
End: 2019-05-14
Payer: MEDICARE

## 2019-05-14 VITALS
HEART RATE: 60 BPM | OXYGEN SATURATION: 93 % | BODY MASS INDEX: 28.63 KG/M2 | SYSTOLIC BLOOD PRESSURE: 130 MMHG | WEIGHT: 200 LBS | DIASTOLIC BLOOD PRESSURE: 60 MMHG | HEIGHT: 70 IN

## 2019-05-14 DIAGNOSIS — Z87.891 PERSONAL HISTORY OF NICOTINE DEPENDENCE: ICD-10-CM

## 2019-05-14 DIAGNOSIS — J98.4 OTHER DISORDERS OF LUNG: ICD-10-CM

## 2019-05-14 DIAGNOSIS — G47.33 OBSTRUCTIVE SLEEP APNEA (ADULT) (PEDIATRIC): ICD-10-CM

## 2019-05-14 DIAGNOSIS — Z99.89 OBSTRUCTIVE SLEEP APNEA (ADULT) (PEDIATRIC): ICD-10-CM

## 2019-05-14 DIAGNOSIS — R06.09 OTHER FORMS OF DYSPNEA: ICD-10-CM

## 2019-05-14 PROCEDURE — 85018 HEMOGLOBIN: CPT | Mod: QW

## 2019-05-14 PROCEDURE — 94010 BREATHING CAPACITY TEST: CPT

## 2019-05-14 PROCEDURE — 99214 OFFICE O/P EST MOD 30 MIN: CPT | Mod: 25

## 2019-05-14 PROCEDURE — 94727 GAS DIL/WSHOT DETER LNG VOL: CPT

## 2019-05-14 PROCEDURE — 94729 DIFFUSING CAPACITY: CPT

## 2019-05-14 RX ORDER — HYDROCODONE BITARTRATE AND HOMATROPINE METHYLBROMIDE 5; 1.5 MG/5ML; MG/5ML
5-1.5 SOLUTION ORAL
Qty: 120 | Refills: 0 | Status: ACTIVE | COMMUNITY
Start: 2019-04-03

## 2019-05-15 ENCOUNTER — RX RENEWAL (OUTPATIENT)
Age: 79
End: 2019-05-15

## 2019-05-15 ENCOUNTER — APPOINTMENT (OUTPATIENT)
Dept: INTERNAL MEDICINE | Facility: CLINIC | Age: 79
End: 2019-05-15
Payer: MEDICARE

## 2019-05-15 VITALS
SYSTOLIC BLOOD PRESSURE: 128 MMHG | HEIGHT: 70 IN | HEART RATE: 72 BPM | BODY MASS INDEX: 27.49 KG/M2 | DIASTOLIC BLOOD PRESSURE: 70 MMHG | RESPIRATION RATE: 12 BRPM | WEIGHT: 192 LBS

## 2019-05-15 DIAGNOSIS — Z51.11 ENCOUNTER FOR ANTINEOPLASTIC CHEMOTHERAPY: ICD-10-CM

## 2019-05-15 DIAGNOSIS — N17.9 ACUTE KIDNEY FAILURE, UNSPECIFIED: ICD-10-CM

## 2019-05-15 DIAGNOSIS — E11.65 TYPE 2 DIABETES MELLITUS WITH HYPERGLYCEMIA: ICD-10-CM

## 2019-05-15 DIAGNOSIS — J18.9 PNEUMONIA, UNSPECIFIED ORGANISM: ICD-10-CM

## 2019-05-15 DIAGNOSIS — F41.9 ANXIETY DISORDER, UNSPECIFIED: ICD-10-CM

## 2019-05-15 PROCEDURE — 99214 OFFICE O/P EST MOD 30 MIN: CPT

## 2019-05-15 RX ORDER — CALCITRIOL 0.25 UG/1
0.25 CAPSULE, LIQUID FILLED ORAL
Qty: 90 | Refills: 1 | Status: ACTIVE | COMMUNITY
Start: 2017-06-19 | End: 1900-01-01

## 2019-05-15 NOTE — HISTORY OF PRESENT ILLNESS
[de-identified] : for followup pna\par went to [place], enjoyed himself\par had no issues.  He feels much better after PNA treatment\par he will have pet scan in June.  He has moderate pain and will be seeing pain management\par he has no chest pain he is saturating today at 98 [FreeTextEntry1] : for follow up pna

## 2019-05-15 NOTE — PHYSICAL EXAM
[No Acute Distress] : no acute distress [Normal Sclera/Conjunctiva] : normal sclera/conjunctiva [PERRL] : pupils equal round and reactive to light [EOMI] : extraocular movements intact [Normal Outer Ear/Nose] : the outer ears and nose were normal in appearance [Normal Oropharynx] : the oropharynx was normal [No JVD] : no jugular venous distention [Supple] : supple [No Lymphadenopathy] : no lymphadenopathy [No Respiratory Distress] : no respiratory distress  [Thyroid Normal, No Nodules] : the thyroid was normal and there were no nodules present [Clear to Auscultation] : lungs were clear to auscultation bilaterally [No Accessory Muscle Use] : no accessory muscle use [Normal Rate] : normal rate  [Regular Rhythm] : with a regular rhythm [No Murmur] : no murmur heard [Normal S1, S2] : normal S1 and S2 [No Varicosities] : no varicosities [No Carotid Bruits] : no carotid bruits [No Abdominal Bruit] : a ~M bruit was not heard ~T in the abdomen [No Extremity Clubbing/Cyanosis] : no extremity clubbing/cyanosis [Pedal Pulses Present] : the pedal pulses are present [No Edema] : there was no peripheral edema [Soft] : abdomen soft [Non Tender] : non-tender [No Palpable Aorta] : no palpable aorta [No Masses] : no abdominal mass palpated [Non-distended] : non-distended [Normal Posterior Cervical Nodes] : no posterior cervical lymphadenopathy [No HSM] : no HSM [Normal Bowel Sounds] : normal bowel sounds [Normal Anterior Cervical Nodes] : no anterior cervical lymphadenopathy [No Spinal Tenderness] : no spinal tenderness [No Joint Swelling] : no joint swelling [No CVA Tenderness] : no CVA  tenderness [Grossly Normal Strength/Tone] : grossly normal strength/tone [Normal Gait] : normal gait [No Rash] : no rash [No Focal Deficits] : no focal deficits [Coordination Grossly Intact] : coordination grossly intact [Deep Tendon Reflexes (DTR)] : deep tendon reflexes were 2+ and symmetric [Normal Insight/Judgement] : insight and judgment were intact [Normal Affect] : the affect was normal [de-identified] : looks better than last 2 visits  [de-identified] : bilateral leg edema

## 2019-05-15 NOTE — ASSESSMENT
[FreeTextEntry1] : pna resolve\par lung cancer cont heme onc follow up \par pain controll\par increase levothyroxine to 112\par

## 2019-05-20 ENCOUNTER — APPOINTMENT (OUTPATIENT)
Dept: PHYSICAL MEDICINE AND REHAB | Facility: CLINIC | Age: 79
End: 2019-05-20
Payer: MEDICARE

## 2019-05-20 VITALS
HEIGHT: 70 IN | BODY MASS INDEX: 27.49 KG/M2 | WEIGHT: 192 LBS | HEART RATE: 72 BPM | OXYGEN SATURATION: 92 % | SYSTOLIC BLOOD PRESSURE: 123 MMHG | DIASTOLIC BLOOD PRESSURE: 72 MMHG

## 2019-05-20 PROCEDURE — 99204 OFFICE O/P NEW MOD 45 MIN: CPT

## 2019-05-21 ENCOUNTER — APPOINTMENT (OUTPATIENT)
Dept: RADIATION ONCOLOGY | Facility: CLINIC | Age: 79
End: 2019-05-21
Payer: MEDICARE

## 2019-05-21 VITALS
WEIGHT: 201 LBS | HEIGHT: 70 IN | DIASTOLIC BLOOD PRESSURE: 72 MMHG | HEART RATE: 85 BPM | OXYGEN SATURATION: 89 % | SYSTOLIC BLOOD PRESSURE: 144 MMHG | RESPIRATION RATE: 16 BRPM | BODY MASS INDEX: 28.77 KG/M2 | TEMPERATURE: 98.2 F

## 2019-05-21 PROCEDURE — 99024 POSTOP FOLLOW-UP VISIT: CPT

## 2019-05-21 NOTE — HISTORY OF PRESENT ILLNESS
[FreeTextEntry1] : 78 year old male returns today s/p 5,000cGy to the lung completed 3/16/17 and 800cGy to the right humerus completed 1/14/19 and re treatment right humerus 2000 cGy completed 4/16/19. \par \par Reports improved 3-10/10 pain right upper arm and hand, intermittent edema right upper arm/fingers.\par Denies dyspnea, cough, headaches, nausea, vomiting, fatigue or weight loss. \par Followed with Dr. Deng 5/9/19 currently on Nivolumab.\par Referred to Dr. Miranda (5/20/19) for pain control, increased gabapentin to 400mg 3 times per day.\par Saw Dr Daly 5/14/19, Dr. Peña 5/15/19\par \par 4/9/19 CT chest:\par IMPRESSION: 8 mm right upper lobe irregular nodular opacity unchanged since March 14, 2019 may represent a primary lung neoplasm given its appearance. Previously described right upper lobe peripheral opacity is unchanged since March 14, 2019.

## 2019-05-21 NOTE — PHYSICAL EXAM
[Normal] : normoactive bowel sounds, soft and nontender, no hepatosplenomegaly or masses appreciated [de-identified] : improved right shoulder/elbow ROM; 4+/5 strength with RUE movements. [de-identified] : dry desquamation noted right upper arm; mild tenderness to palpation of right upper arm, mid-femur

## 2019-05-21 NOTE — DISEASE MANAGEMENT
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

## 2019-05-21 NOTE — VITALS
[Maximal Pain Intensity: 10/10] : 10/10 [Least Pain Intensity: 3/10] : 3/10 [Pain Description/Quality: ___] : Pain description/quality: [unfilled] [Pain Duration: ___] : Pain duration: [unfilled] [Pain Location: ___] : Pain Location: [unfilled] [Pain Interferes with ADLs] : Pain interferes with activities of daily living. [Opioid] : opioid [Adjuvant (neuroleptics)] : adjuvant (neuroleptics) [70: Cares for self; unalbe to carry on normal activity or do active work.] : 70: Cares for self; unable to carry on normal activity or do active work.

## 2019-05-21 NOTE — REASON FOR VISIT
[Post-Treatment Evaluation] : post-treatment evaluation for [Bone Metastasis] : bone metastasis [Spouse] : spouse

## 2019-05-21 NOTE — REVIEW OF SYSTEMS
[Negative] : Allergic/Immunologic [Dermatitis Radiation: Grade 1 - Faint erythema or dry desquamation] : Dermatitis Radiation: Grade 1 - Faint erythema or dry desquamation [FreeTextEntry9] : right hand/amr [de-identified] : dry [Pruritus: Grade 1 - Mild or localized; topical intervention indicated] : Pruritus: Grade 1 - Mild or localized; topical intervention indicated [Skin Atrophy: Grade 0] : Skin Atrophy: Grade 0 [Skin Hyperpigmentation: Grade 0] : Skin Hyperpigmentation: Grade 0 [Skin Induration: Grade 0] : Skin Induration: Grade 0 [FreeTextEntry6] : dry desquamation

## 2019-05-28 ENCOUNTER — APPOINTMENT (OUTPATIENT)
Dept: RADIATION ONCOLOGY | Facility: CLINIC | Age: 79
End: 2019-05-28

## 2019-05-28 ENCOUNTER — RX RENEWAL (OUTPATIENT)
Age: 79
End: 2019-05-28

## 2019-05-28 NOTE — HISTORY OF PRESENT ILLNESS
[FreeTextEntry1] : Mr. KARLI UGARTE is a 78 year year old male here for evaluation of right arm pain. He has history of lung Ca. The pain started at the right lateral upper arm, progressed over the past couple of months, found to have metastatic lesion with neuropathic pain over the arm and hand. He was seen by Dr. Richard, ultimately proceeded to RT. He has been taking fentanyl TD 75mcg/hr, hydrocodone prn with mixed success, gabapentin 300/300/600 with improvement in neuropathic pain. There is some clumsiness with the right hand.\par \par Location: right lateral arm radiating to his forearm and radial distribution of his hand\par Duration: months\par Inciting Event/Context: no specific inciting event or trauma\par Onset/Timing: constant\par Quality: burning, sharp\par Severity: 6/10\par Exacerbating factors: worse with certain times of the day, not related to activity\par Relieving factors: neuropathic pain medications\par Numbness/Tingling: over dorsum of the hand radial distribution\par Bowel/Bladder neurological incontinence: denies\par upper ext. weakness: clumsiness on the right hand\par \par Current Treatments:\par Rad: s/p 5,000cGy to the lung completed 3/16/17 and 800cGy to the right humerus completed 1/14/19 and re treatment right humerus 2000 cGy completed 4/16/19. \par Medications: fentanyl 75mcg/hr TD, hydrocodone - has some constipation, hallucinations\par Imaging: CT appreaciated, pending repeat PET/CT

## 2019-05-28 NOTE — PHYSICAL EXAM
[FreeTextEntry1] : General: NAD, alert\par Psych: normal mood and affect\par HEENT: NC/AT, normal visual tracking\par Pulmonary: no resp distress, chest expansion appears symmetrical\par CV: extremities are warm and perfused\par Abd: non-distended\par Ext: asymmetric skin color on the right arm compared to the left, mild swelling on the right\par \par Cervical Spine/Upper Extremity:\par Inspection: mild swelling and color change on right arm; slight tremor on the right\par Tenderness to palpation: TTP mid humerus, difficult to assess, slight allodynia over radial distribution/paresthesias\par ROM: 80 degree flexion/abduction on the right shoulder\par MMT: 4+/5 right finger and wrist extension toherwise 5/5 bilateral upper extremities\par dec coordination on the right hand\par Reflexes: symmetric bilateral biceps, triceps \par Sensory: dec to LT right radial distribution\par Provacative testing:\par Spurlings negative \par Martino negative\par Neers negative\par Pitts’s negative

## 2019-05-28 NOTE — ASSESSMENT
[FreeTextEntry1] : Mr. 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 There is associated swelling/color change which can be associated with sympathetic/vascular component. There is likely adhesive capsulitis component on the right shoulder.\par \par 1. Agree with PET/CT\par 2. Right arm pain, nociceptive: likely 2/2 metastatic lesion, recommend follow up with Dr. Richard as radiation has had only partial improvement to his pain.\par 3. Neuropathic Pain: likely radial neuropathy; however, given vascular component, rule out upper lung/plexus impingement with upcoming PET. Uptitrate gabapentin 400/400/800mg \par 4. Would not recommend uptitrate of opioid analgesia due to adverse effects. Continue with fentanyl 75mg TD. Would stop hydrocodone prn, can use tylenol/NSAIDS.\par 5. Follow up in 1 month.\par

## 2019-05-30 ENCOUNTER — APPOINTMENT (OUTPATIENT)
Age: 79
End: 2019-05-30

## 2019-06-06 ENCOUNTER — LABORATORY RESULT (OUTPATIENT)
Age: 79
End: 2019-06-06

## 2019-06-06 ENCOUNTER — APPOINTMENT (OUTPATIENT)
Age: 79
End: 2019-06-06

## 2019-06-06 ENCOUNTER — RESULT REVIEW (OUTPATIENT)
Age: 79
End: 2019-06-06

## 2019-06-06 LAB
BASOPHILS # BLD AUTO: 0 K/UL — SIGNIFICANT CHANGE UP (ref 0–0.2)
BASOPHILS NFR BLD AUTO: 0.5 % — SIGNIFICANT CHANGE UP (ref 0–2)
EOSINOPHIL # BLD AUTO: 0 K/UL — SIGNIFICANT CHANGE UP (ref 0–0.5)
EOSINOPHIL NFR BLD AUTO: 0.6 % — SIGNIFICANT CHANGE UP (ref 0–6)
HCT VFR BLD CALC: 28.6 % — LOW (ref 39–50)
HGB BLD-MCNC: 8.8 G/DL — LOW (ref 13–17)
LYMPHOCYTES # BLD AUTO: 0.8 K/UL — LOW (ref 1–3.3)
LYMPHOCYTES # BLD AUTO: 10.9 % — LOW (ref 13–44)
MCHC RBC-ENTMCNC: 26.8 PG — LOW (ref 27–34)
MCHC RBC-ENTMCNC: 30.8 G/DL — LOW (ref 32–36)
MCV RBC AUTO: 87.3 FL — SIGNIFICANT CHANGE UP (ref 80–100)
MONOCYTES # BLD AUTO: 0.5 K/UL — SIGNIFICANT CHANGE UP (ref 0–0.9)
MONOCYTES NFR BLD AUTO: 6.6 % — SIGNIFICANT CHANGE UP (ref 2–14)
NEUTROPHILS # BLD AUTO: 5.9 K/UL — SIGNIFICANT CHANGE UP (ref 1.8–7.4)
NEUTROPHILS NFR BLD AUTO: 81.3 % — HIGH (ref 43–77)
PLATELET # BLD AUTO: 302 K/UL — SIGNIFICANT CHANGE UP (ref 150–400)
RBC # BLD: 3.28 M/UL — LOW (ref 4.2–5.8)
RBC # FLD: 17.1 % — HIGH (ref 10.3–14.5)
WBC # BLD: 7.3 K/UL — SIGNIFICANT CHANGE UP (ref 3.8–10.5)
WBC # FLD AUTO: 7.3 K/UL — SIGNIFICANT CHANGE UP (ref 3.8–10.5)

## 2019-06-10 ENCOUNTER — APPOINTMENT (OUTPATIENT)
Dept: PHYSICAL MEDICINE AND REHAB | Facility: CLINIC | Age: 79
End: 2019-06-10

## 2019-06-10 ENCOUNTER — RX RENEWAL (OUTPATIENT)
Age: 79
End: 2019-06-10

## 2019-06-14 ENCOUNTER — RX RENEWAL (OUTPATIENT)
Age: 79
End: 2019-06-14

## 2019-06-16 ENCOUNTER — FORM ENCOUNTER (OUTPATIENT)
Age: 79
End: 2019-06-16

## 2019-06-17 ENCOUNTER — APPOINTMENT (OUTPATIENT)
Dept: PHYSICAL MEDICINE AND REHAB | Facility: CLINIC | Age: 79
End: 2019-06-17
Payer: MEDICARE

## 2019-06-17 ENCOUNTER — APPOINTMENT (OUTPATIENT)
Dept: NUCLEAR MEDICINE | Facility: CLINIC | Age: 79
End: 2019-06-17
Payer: MEDICARE

## 2019-06-17 ENCOUNTER — OUTPATIENT (OUTPATIENT)
Dept: OUTPATIENT SERVICES | Facility: HOSPITAL | Age: 79
LOS: 1 days | End: 2019-06-17

## 2019-06-17 ENCOUNTER — APPOINTMENT (OUTPATIENT)
Dept: ULTRASOUND IMAGING | Facility: CLINIC | Age: 79
End: 2019-06-17
Payer: MEDICARE

## 2019-06-17 VITALS
HEIGHT: 70 IN | BODY MASS INDEX: 28.64 KG/M2 | HEART RATE: 75 BPM | OXYGEN SATURATION: 91 % | SYSTOLIC BLOOD PRESSURE: 125 MMHG | WEIGHT: 200.04 LBS | DIASTOLIC BLOOD PRESSURE: 63 MMHG

## 2019-06-17 DIAGNOSIS — Z90.49 ACQUIRED ABSENCE OF OTHER SPECIFIED PARTS OF DIGESTIVE TRACT: Chronic | ICD-10-CM

## 2019-06-17 DIAGNOSIS — Z96.659 PRESENCE OF UNSPECIFIED ARTIFICIAL KNEE JOINT: Chronic | ICD-10-CM

## 2019-06-17 DIAGNOSIS — C34.91 MALIGNANT NEOPLASM OF UNSPECIFIED PART OF RIGHT BRONCHUS OR LUNG: ICD-10-CM

## 2019-06-17 DIAGNOSIS — C32.0 MALIGNANT NEOPLASM OF GLOTTIS: Chronic | ICD-10-CM

## 2019-06-17 PROCEDURE — 78815 PET IMAGE W/CT SKULL-THIGH: CPT | Mod: 26,PS

## 2019-06-17 PROCEDURE — 93971 EXTREMITY STUDY: CPT | Mod: 26,RT

## 2019-06-17 PROCEDURE — 99214 OFFICE O/P EST MOD 30 MIN: CPT

## 2019-06-17 RX ORDER — GABAPENTIN 300 MG/1
300 CAPSULE ORAL
Qty: 120 | Refills: 2 | Status: COMPLETED | COMMUNITY
Start: 2019-03-20 | End: 2019-06-17

## 2019-06-18 ENCOUNTER — APPOINTMENT (OUTPATIENT)
Dept: ORTHOPEDIC SURGERY | Facility: CLINIC | Age: 79
End: 2019-06-18
Payer: MEDICARE

## 2019-06-18 PROCEDURE — 99214 OFFICE O/P EST MOD 30 MIN: CPT

## 2019-06-18 PROCEDURE — 73060 X-RAY EXAM OF HUMERUS: CPT | Mod: RT

## 2019-06-18 RX ORDER — GABAPENTIN 800 MG/1
800 TABLET, COATED ORAL 3 TIMES DAILY
Qty: 90 | Refills: 3 | Status: ACTIVE | COMMUNITY
Start: 2019-06-17

## 2019-06-18 NOTE — HISTORY OF PRESENT ILLNESS
[FreeTextEntry1] : Patient was seen today in followup with a history of carcinoma of the lung and the subcutaneous collision to the right proximal humerus post radiation therapy. However over the last several months patient having severe progressive tenderness over the right upper extremity difficulty with range of motion

## 2019-06-18 NOTE — HISTORY OF PRESENT ILLNESS
[FreeTextEntry1] : Mr. KARLI UGARTE is a 78 year year old male here for follow up of right arm pain. He has history of lung Ca. The pain started at the right lateral upper arm, progressed over the past couple of months, found to have metastatic lesion with neuropathic pain over the arm and hand. He was seen by Dr. Richard, ultimately proceeded to RT. He has been taking fentanyl TD 75mcg/hr, hydrocodone prn with mixed success, gabapentin 800mg with improvement in neuropathic pain. There is some clumsiness with the right hand. Since last visit, he reports worsening pain, is pending PET/CT today.\par \par Location: right lateral arm radiating to his forearm and radial distribution of his hand\par Duration: months\par Inciting Event/Context: no specific inciting event or trauma\par Onset/Timing: constant\par Quality: burning, sharp\par Severity: 8/10\par Exacerbating factors: worse with certain times of the day, not related to activity\par Relieving factors: neuropathic pain medications\par Numbness/Tingling: over dorsum of the hand radial distribution\par Bowel/Bladder neurological incontinence: denies\par upper ext. weakness: clumsiness on the right hand\par \par Current Treatments:\par Rad: s/p 5,000cGy to the lung completed 3/16/17 and 800cGy to the right humerus completed 1/14/19 and re treatment right humerus 2000 cGy completed 4/16/19. \par Medications: fentanyl 75mcg/hr TD, hydrocodone - has some constipation, hallucinations\par Imaging: CT appreciated, pending repeat PET/CT

## 2019-06-18 NOTE — ASSESSMENT
[FreeTextEntry1] : Mr. 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 There is associated swelling/color change which can be associated with sympathetic/vascular component. There is likely adhesive capsulitis component on the right shoulder.\par \par 1. Agree with PET/CT\par 2. Right arm pain, nociceptive: likely 2/2 metastatic lesion, recommend follow up with Dr. Richard as radiation with minimal improvement of the pain\par - evaluate for DVT with duplex\par 3. Neuropathic Pain: rule out upper lung/plexus impingement with upcoming PET. Uptitrate gabapentin 800mg PO TID\par - medrol dose pack\par 4. Would not recommend uptitrate of opioid analgesia due to adverse effects. Continue with fentanyl 75mg TD, hydrocodone prn\par 5. Follow up in 1 month.\par

## 2019-06-18 NOTE — PHYSICAL EXAM
[FreeTextEntry1] : Physical exam reveals a patient who is complaining of pain tenderness over the right upper extremity difficulty with range of motion patient is fully alert oriented no significant palpable mass and no gross neurovascular deficits\par X-ray of the right proximal humerus demonstrated destructive lytic lesion over the proximal humerus recent PET scan demonstrates intense increased metabolic activity suggesting operative metastatic lung carcinoma at this stage patient was recommended to proceed with exploration and localized and resection as a palliative procedure and the internal fixation using cement and a locking plate the nature of surgical procedure was discussed with the patient

## 2019-06-18 NOTE — PHYSICAL EXAM
[FreeTextEntry1] : General: NAD, alert\par Psych: normal mood and affect\par HEENT: NC/AT, normal visual tracking\par Pulmonary: no resp distress, chest expansion appears symmetrical\par CV: extremities are warm and perfused\par Abd: non-distended\par Ext: asymmetric skin color on the right arm compared to the left, mild swelling on the right\par \par Cervical Spine/Upper Extremity:\par Inspection: mild swelling and color change on right arm; slight tremor on the right\par Tenderness to palpation: TTP mid humerus, difficult to assess, slight allodynia over radial distribution/paresthesias\par ROM: 80 degree flexion/abduction on the right shoulder\par MMT: improved wrist extension, but continues to have difficulty with coordination and pain \par dec coordination on the right hand\par Reflexes: symmetric bilateral biceps, triceps \par Sensory: dec to LT right mediandistribution\par Provacative testing:\par Spurlings negative \par Martino negative\par Neers negative\par Pitts’s negative

## 2019-06-19 ENCOUNTER — RX RENEWAL (OUTPATIENT)
Age: 79
End: 2019-06-19

## 2019-06-19 RX ORDER — HYDROMORPHONE HYDROCHLORIDE 2 MG/1
2 TABLET ORAL
Qty: 112 | Refills: 0 | Status: ACTIVE | COMMUNITY
Start: 2019-06-19 | End: 1900-01-01

## 2019-06-26 ENCOUNTER — OUTPATIENT (OUTPATIENT)
Dept: OUTPATIENT SERVICES | Facility: HOSPITAL | Age: 79
LOS: 1 days | End: 2019-06-26

## 2019-06-26 VITALS
WEIGHT: 195.11 LBS | HEART RATE: 77 BPM | DIASTOLIC BLOOD PRESSURE: 60 MMHG | TEMPERATURE: 98 F | SYSTOLIC BLOOD PRESSURE: 110 MMHG | RESPIRATION RATE: 16 BRPM | OXYGEN SATURATION: 96 % | HEIGHT: 68 IN

## 2019-06-26 DIAGNOSIS — Z90.49 ACQUIRED ABSENCE OF OTHER SPECIFIED PARTS OF DIGESTIVE TRACT: Chronic | ICD-10-CM

## 2019-06-26 DIAGNOSIS — C34.90 MALIGNANT NEOPLASM OF UNSPECIFIED PART OF UNSPECIFIED BRONCHUS OR LUNG: ICD-10-CM

## 2019-06-26 DIAGNOSIS — C32.0 MALIGNANT NEOPLASM OF GLOTTIS: Chronic | ICD-10-CM

## 2019-06-26 DIAGNOSIS — Z96.659 PRESENCE OF UNSPECIFIED ARTIFICIAL KNEE JOINT: Chronic | ICD-10-CM

## 2019-06-26 LAB
ALBUMIN SERPL ELPH-MCNC: 3.3 G/DL — SIGNIFICANT CHANGE UP (ref 3.3–5)
ALP SERPL-CCNC: 110 U/L — SIGNIFICANT CHANGE UP (ref 40–120)
ALT FLD-CCNC: 18 U/L — SIGNIFICANT CHANGE UP (ref 4–41)
ANION GAP SERPL CALC-SCNC: 15 MMO/L — HIGH (ref 7–14)
AST SERPL-CCNC: 16 U/L — SIGNIFICANT CHANGE UP (ref 4–40)
BILIRUB SERPL-MCNC: 1 MG/DL — SIGNIFICANT CHANGE UP (ref 0.2–1.2)
BLD GP AB SCN SERPL QL: NEGATIVE — SIGNIFICANT CHANGE UP
BUN SERPL-MCNC: 41 MG/DL — HIGH (ref 7–23)
CALCIUM SERPL-MCNC: 9.8 MG/DL — SIGNIFICANT CHANGE UP (ref 8.4–10.5)
CHLORIDE SERPL-SCNC: 87 MMOL/L — LOW (ref 98–107)
CO2 SERPL-SCNC: 22 MMOL/L — SIGNIFICANT CHANGE UP (ref 22–31)
CREAT SERPL-MCNC: 1.57 MG/DL — HIGH (ref 0.5–1.3)
GLUCOSE SERPL-MCNC: 155 MG/DL — HIGH (ref 70–99)
HBA1C BLD-MCNC: 6.6 % — HIGH (ref 4–5.6)
HCT VFR BLD CALC: 30.7 % — LOW (ref 39–50)
HGB BLD-MCNC: 9.5 G/DL — LOW (ref 13–17)
MCHC RBC-ENTMCNC: 26.4 PG — LOW (ref 27–34)
MCHC RBC-ENTMCNC: 30.9 % — LOW (ref 32–36)
MCV RBC AUTO: 85.3 FL — SIGNIFICANT CHANGE UP (ref 80–100)
NRBC # FLD: 0 K/UL — SIGNIFICANT CHANGE UP (ref 0–0)
PLATELET # BLD AUTO: 278 K/UL — SIGNIFICANT CHANGE UP (ref 150–400)
PMV BLD: 8.8 FL — SIGNIFICANT CHANGE UP (ref 7–13)
POTASSIUM SERPL-MCNC: 4.8 MMOL/L — SIGNIFICANT CHANGE UP (ref 3.5–5.3)
POTASSIUM SERPL-SCNC: 4.8 MMOL/L — SIGNIFICANT CHANGE UP (ref 3.5–5.3)
PROT SERPL-MCNC: 7.6 G/DL — SIGNIFICANT CHANGE UP (ref 6–8.3)
RBC # BLD: 3.6 M/UL — LOW (ref 4.2–5.8)
RBC # FLD: 16.9 % — HIGH (ref 10.3–14.5)
RH IG SCN BLD-IMP: POSITIVE — SIGNIFICANT CHANGE UP
SODIUM SERPL-SCNC: 124 MMOL/L — LOW (ref 135–145)
WBC # BLD: 9.99 K/UL — SIGNIFICANT CHANGE UP (ref 3.8–10.5)
WBC # FLD AUTO: 9.99 K/UL — SIGNIFICANT CHANGE UP (ref 3.8–10.5)

## 2019-06-26 RX ORDER — DENOSUMAB 60 MG/ML
0 INJECTION SUBCUTANEOUS
Qty: 0 | Refills: 0 | DISCHARGE

## 2019-06-26 RX ORDER — FOLIC ACID 0.8 MG
1 TABLET ORAL
Qty: 0 | Refills: 0 | DISCHARGE

## 2019-06-26 RX ORDER — LORATADINE 10 MG/1
1 TABLET ORAL
Qty: 0 | Refills: 0 | DISCHARGE

## 2019-06-26 RX ORDER — ZOLPIDEM TARTRATE 10 MG/1
1 TABLET ORAL
Qty: 0 | Refills: 0 | DISCHARGE

## 2019-06-26 RX ORDER — UMECLIDINIUM 62.5 UG/1
0 AEROSOL, POWDER ORAL
Qty: 0 | Refills: 0 | DISCHARGE

## 2019-06-26 RX ORDER — FLUTICASONE PROPIONATE 50 MCG
1 SPRAY, SUSPENSION NASAL
Qty: 0 | Refills: 0 | DISCHARGE

## 2019-06-26 RX ORDER — FLUTICASONE PROPIONATE 220 MCG
1 AEROSOL WITH ADAPTER (GRAM) INHALATION
Qty: 0 | Refills: 0 | DISCHARGE

## 2019-06-26 RX ORDER — SIMVASTATIN 20 MG/1
1 TABLET, FILM COATED ORAL
Qty: 0 | Refills: 0 | DISCHARGE

## 2019-06-26 NOTE — H&P PST ADULT - HISTORY OF PRESENT ILLNESS
79 yo PMH hypertension, hyperlipidemia, hypothyroidism, DM2, vocal cord CA s/p RT 1996, lung cancer s/p chemo and RT presents to pre surgical testing.  Pt reports lung cancer has metastacised to the right humerus and causing a lot of pain.    Pt is scheduled for exploration localized resection internal fixation right humerus using synthes locking plate on 7/2/19. 77 yo male with PMH hypertension, hyperlipidemia, hypothyroidism, DM2, vocal cord CA s/p RT 1996, lung cancer s/p chemo and RT presents to pre surgical testing.  Pt reports lung cancer has metastasized to the right humerus and causing a lot of pain, confirmed by PET/CT.    Pt is scheduled for exploration localized resection internal fixation right humerus using synthes locking plate on 7/2/19.

## 2019-06-26 NOTE — H&P PST ADULT - LYMPHATIC
posterior cervical L/anterior cervical R/anterior cervical L/posterior cervical R/supraclavicular L/supraclavicular R

## 2019-06-26 NOTE — H&P PST ADULT - NSICDXPROBLEM_GEN_ALL_CORE_FT
PROBLEM DIAGNOSES  Problem: Lung cancer  Assessment and Plan:  Pt is scheduled for exploration localized resection internal fixation right humerus using synthes locking plate on 7/2/19.   Verbal and written pre op instructions reviewed with patient and pt able to verbalize understanding.   Verbal and written instructions given with chlorhexidine wash, pt able to verbalize understanding via teach back method.   Pt to obtain medical eval as requested by surgeon, will request document. Will request recent ECHO.  Pt instructed to take levothyroxine AM of surgery with a sip of water, pt able to verbalize understanding.   Pt instructed to hold metformin on 7/1 and 7/2, hold novolog on 7/2. Pt instructed to take 24 units of lantus on 7/1PM.  OR booking notified of pt's DM status via fax.   OR booking notified of pt's GEORGE status via fax.

## 2019-06-26 NOTE — H&P PST ADULT - NSICDXPASTSURGICALHX_GEN_ALL_CORE_FT
PAST SURGICAL HISTORY:  Cancer of vocal cord 1996 biopsy    S/P cholecystectomy     S/P knee replacement bilateral, 2011

## 2019-06-26 NOTE — H&P PST ADULT - NEGATIVE CARDIOVASCULAR SYMPTOMS
no dyspnea on exertion/no orthopnea/no paroxysmal nocturnal dyspnea/no palpitations/no chest pain/no claudication/no peripheral edema

## 2019-06-26 NOTE — H&P PST ADULT - NSICDXFAMILYHX_GEN_ALL_CORE_FT
FAMILY HISTORY:  Father  Still living? Unknown  Family history of esophageal cancer, Age at diagnosis: Age Unknown    Mother  Still living? Unknown  Family history of liver disease, Age at diagnosis: Age Unknown

## 2019-06-26 NOTE — H&P PST ADULT - NEGATIVE GENERAL SYMPTOMS
no fever/no chills/no sweating/no anorexia/no weight loss/no weight gain no anorexia/no chills/no sweating/no fever/no weight gain

## 2019-06-26 NOTE — H&P PST ADULT - NEGATIVE MUSCULOSKELETAL SYMPTOMS
no joint swelling/no muscle weakness/no myalgia/no muscle cramps/no leg pain R/no neck pain/no leg pain L/no stiffness/no arm pain L/no arthralgia

## 2019-06-26 NOTE — H&P PST ADULT - NEGATIVE NEUROLOGICAL SYMPTOMS
no transient paralysis/no difficulty walking/no weakness/no loss of sensation/no vertigo/no headache/no generalized seizures

## 2019-07-01 ENCOUNTER — APPOINTMENT (OUTPATIENT)
Dept: INTERNAL MEDICINE | Facility: CLINIC | Age: 79
End: 2019-07-01
Payer: MEDICARE

## 2019-07-01 ENCOUNTER — APPOINTMENT (OUTPATIENT)
Dept: NEPHROLOGY | Facility: CLINIC | Age: 79
End: 2019-07-01
Payer: MEDICARE

## 2019-07-01 ENCOUNTER — OUTPATIENT (OUTPATIENT)
Dept: OUTPATIENT SERVICES | Facility: HOSPITAL | Age: 79
LOS: 1 days | Discharge: HOME | End: 2019-07-01

## 2019-07-01 ENCOUNTER — RX RENEWAL (OUTPATIENT)
Age: 79
End: 2019-07-01

## 2019-07-01 ENCOUNTER — INPATIENT (INPATIENT)
Facility: HOSPITAL | Age: 79
LOS: 1 days | Discharge: HOME | End: 2019-07-03
Attending: ORTHOPAEDIC SURGERY | Admitting: ORTHOPAEDIC SURGERY
Payer: MEDICARE

## 2019-07-01 ENCOUNTER — TRANSCRIPTION ENCOUNTER (OUTPATIENT)
Age: 79
End: 2019-07-01

## 2019-07-01 VITALS — RESPIRATION RATE: 14 BRPM | SYSTOLIC BLOOD PRESSURE: 108 MMHG | HEART RATE: 70 BPM | DIASTOLIC BLOOD PRESSURE: 68 MMHG

## 2019-07-01 VITALS
WEIGHT: 196 LBS | OXYGEN SATURATION: 91 % | HEART RATE: 70 BPM | DIASTOLIC BLOOD PRESSURE: 70 MMHG | HEIGHT: 70 IN | BODY MASS INDEX: 28.06 KG/M2 | SYSTOLIC BLOOD PRESSURE: 130 MMHG

## 2019-07-01 VITALS — WEIGHT: 195 LBS | HEIGHT: 70 IN | BODY MASS INDEX: 27.92 KG/M2

## 2019-07-01 VITALS
DIASTOLIC BLOOD PRESSURE: 71 MMHG | HEART RATE: 77 BPM | RESPIRATION RATE: 18 BRPM | SYSTOLIC BLOOD PRESSURE: 164 MMHG | TEMPERATURE: 98 F | OXYGEN SATURATION: 97 %

## 2019-07-01 DIAGNOSIS — N18.3 CHRONIC KIDNEY DISEASE, STAGE 3 (MODERATE): ICD-10-CM

## 2019-07-01 DIAGNOSIS — E87.1 HYPO-OSMOLALITY AND HYPONATREMIA: ICD-10-CM

## 2019-07-01 DIAGNOSIS — C79.51 SECONDARY MALIGNANT NEOPLASM OF BONE: ICD-10-CM

## 2019-07-01 DIAGNOSIS — C32.0 MALIGNANT NEOPLASM OF GLOTTIS: Chronic | ICD-10-CM

## 2019-07-01 DIAGNOSIS — Z01.818 ENCOUNTER FOR OTHER PREPROCEDURAL EXAMINATION: ICD-10-CM

## 2019-07-01 DIAGNOSIS — C34.90 MALIGNANT NEOPLASM OF UNSPECIFIED PART OF UNSPECIFIED BRONCHUS OR LUNG: ICD-10-CM

## 2019-07-01 DIAGNOSIS — D63.1 ANEMIA IN CHRONIC KIDNEY DISEASE: ICD-10-CM

## 2019-07-01 DIAGNOSIS — Z90.49 ACQUIRED ABSENCE OF OTHER SPECIFIED PARTS OF DIGESTIVE TRACT: Chronic | ICD-10-CM

## 2019-07-01 DIAGNOSIS — Z96.659 PRESENCE OF UNSPECIFIED ARTIFICIAL KNEE JOINT: Chronic | ICD-10-CM

## 2019-07-01 PROBLEM — R91.1 SOLITARY PULMONARY NODULE: Chronic | Status: ACTIVE | Noted: 2017-06-14

## 2019-07-01 PROBLEM — F41.9 ANXIETY DISORDER, UNSPECIFIED: Chronic | Status: ACTIVE | Noted: 2019-06-26

## 2019-07-01 PROBLEM — G47.30 SLEEP APNEA, UNSPECIFIED: Chronic | Status: ACTIVE | Noted: 2017-06-14

## 2019-07-01 PROBLEM — E11.9 TYPE 2 DIABETES MELLITUS WITHOUT COMPLICATIONS: Chronic | Status: ACTIVE | Noted: 2017-06-14

## 2019-07-01 LAB
ANION GAP SERPL CALC-SCNC: 14 MMO/L — SIGNIFICANT CHANGE UP (ref 7–14)
ANION GAP SERPL CALC-SCNC: 19 MMO/L — HIGH (ref 7–14)
APTT BLD: 29.2 SEC — SIGNIFICANT CHANGE UP (ref 27.5–36.3)
BLD GP AB SCN SERPL QL: NEGATIVE — SIGNIFICANT CHANGE UP
BUN SERPL-MCNC: 34 MG/DL — HIGH (ref 7–23)
BUN SERPL-MCNC: 35 MG/DL — HIGH (ref 7–23)
CALCIUM SERPL-MCNC: 8.9 MG/DL — SIGNIFICANT CHANGE UP (ref 8.4–10.5)
CALCIUM SERPL-MCNC: 9.1 MG/DL — SIGNIFICANT CHANGE UP (ref 8.4–10.5)
CHLORIDE SERPL-SCNC: 88 MMOL/L — LOW (ref 98–107)
CHLORIDE SERPL-SCNC: 91 MMOL/L — LOW (ref 98–107)
CO2 SERPL-SCNC: 21 MMOL/L — LOW (ref 22–31)
CO2 SERPL-SCNC: 23 MMOL/L — SIGNIFICANT CHANGE UP (ref 22–31)
CREAT SERPL-MCNC: 1.33 MG/DL — HIGH (ref 0.5–1.3)
CREAT SERPL-MCNC: 1.42 MG/DL — HIGH (ref 0.5–1.3)
GLUCOSE SERPL-MCNC: 194 MG/DL — HIGH (ref 70–99)
GLUCOSE SERPL-MCNC: 199 MG/DL — HIGH (ref 70–99)
HCT VFR BLD CALC: 29.8 % — LOW (ref 39–50)
HGB BLD-MCNC: 9.4 G/DL — LOW (ref 13–17)
INR BLD: 1.35 — HIGH (ref 0.88–1.17)
MAGNESIUM SERPL-MCNC: 1.9 MG/DL — SIGNIFICANT CHANGE UP (ref 1.6–2.6)
MCHC RBC-ENTMCNC: 27.2 PG — SIGNIFICANT CHANGE UP (ref 27–34)
MCHC RBC-ENTMCNC: 31.5 % — LOW (ref 32–36)
MCV RBC AUTO: 86.1 FL — SIGNIFICANT CHANGE UP (ref 80–100)
NRBC # FLD: 0 K/UL — SIGNIFICANT CHANGE UP (ref 0–0)
NT-PROBNP SERPL-SCNC: 1111 PG/ML — SIGNIFICANT CHANGE UP
OSMOLALITY SERPL: 281 MOSMO/KG — SIGNIFICANT CHANGE UP (ref 275–295)
PLATELET # BLD AUTO: 310 K/UL — SIGNIFICANT CHANGE UP (ref 150–400)
PMV BLD: 8.3 FL — SIGNIFICANT CHANGE UP (ref 7–13)
POTASSIUM SERPL-MCNC: 4.6 MMOL/L — SIGNIFICANT CHANGE UP (ref 3.5–5.3)
POTASSIUM SERPL-MCNC: 4.6 MMOL/L — SIGNIFICANT CHANGE UP (ref 3.5–5.3)
POTASSIUM SERPL-SCNC: 4.6 MMOL/L — SIGNIFICANT CHANGE UP (ref 3.5–5.3)
POTASSIUM SERPL-SCNC: 4.6 MMOL/L — SIGNIFICANT CHANGE UP (ref 3.5–5.3)
PROTHROM AB SERPL-ACNC: 15.1 SEC — HIGH (ref 9.8–13.1)
RBC # BLD: 3.46 M/UL — LOW (ref 4.2–5.8)
RBC # FLD: 16.6 % — HIGH (ref 10.3–14.5)
RH IG SCN BLD-IMP: POSITIVE — SIGNIFICANT CHANGE UP
SODIUM SERPL-SCNC: 128 MMOL/L — LOW (ref 135–145)
SODIUM SERPL-SCNC: 128 MMOL/L — LOW (ref 135–145)
TROPONIN T, HIGH SENSITIVITY: 44 NG/L — SIGNIFICANT CHANGE UP (ref ?–14)
TSH SERPL-MCNC: 8.32 UIU/ML — HIGH (ref 0.27–4.2)
URATE SERPL-MCNC: 6.6 MG/DL — SIGNIFICANT CHANGE UP (ref 3.4–8.8)
WBC # BLD: 10.25 K/UL — SIGNIFICANT CHANGE UP (ref 3.8–10.5)
WBC # FLD AUTO: 10.25 K/UL — SIGNIFICANT CHANGE UP (ref 3.8–10.5)

## 2019-07-01 PROCEDURE — 99214 OFFICE O/P EST MOD 30 MIN: CPT

## 2019-07-01 PROCEDURE — 71045 X-RAY EXAM CHEST 1 VIEW: CPT | Mod: 26

## 2019-07-01 PROCEDURE — 99223 1ST HOSP IP/OBS HIGH 75: CPT

## 2019-07-01 PROCEDURE — 99205 OFFICE O/P NEW HI 60 MIN: CPT

## 2019-07-01 PROCEDURE — 99215 OFFICE O/P EST HI 40 MIN: CPT

## 2019-07-01 RX ORDER — AMLODIPINE BESYLATE 2.5 MG/1
5 TABLET ORAL ONCE
Refills: 0 | Status: COMPLETED | OUTPATIENT
Start: 2019-07-01 | End: 2019-07-01

## 2019-07-01 RX ORDER — SODIUM CHLORIDE 9 MG/ML
1000 INJECTION, SOLUTION INTRAVENOUS
Refills: 0 | Status: DISCONTINUED | OUTPATIENT
Start: 2019-07-01 | End: 2019-07-03

## 2019-07-01 RX ORDER — INSULIN LISPRO 100/ML
VIAL (ML) SUBCUTANEOUS EVERY 6 HOURS
Refills: 0 | Status: DISCONTINUED | OUTPATIENT
Start: 2019-07-01 | End: 2019-07-03

## 2019-07-01 RX ORDER — SENNA PLUS 8.6 MG/1
2 TABLET ORAL AT BEDTIME
Refills: 0 | Status: DISCONTINUED | OUTPATIENT
Start: 2019-07-01 | End: 2019-07-03

## 2019-07-01 RX ORDER — LEVOTHYROXINE SODIUM 125 MCG
112 TABLET ORAL DAILY
Refills: 0 | Status: DISCONTINUED | OUTPATIENT
Start: 2019-07-01 | End: 2019-07-03

## 2019-07-01 RX ORDER — FLUOXETINE HCL 10 MG
20 CAPSULE ORAL ONCE
Refills: 0 | Status: COMPLETED | OUTPATIENT
Start: 2019-07-01 | End: 2019-07-01

## 2019-07-01 RX ORDER — FENTANYL CITRATE 50 UG/ML
1 INJECTION INTRAVENOUS
Refills: 0 | Status: DISCONTINUED | OUTPATIENT
Start: 2019-07-01 | End: 2019-07-02

## 2019-07-01 RX ORDER — OXYCODONE HYDROCHLORIDE 5 MG/1
5 TABLET ORAL EVERY 4 HOURS
Refills: 0 | Status: DISCONTINUED | OUTPATIENT
Start: 2019-07-01 | End: 2019-07-03

## 2019-07-01 RX ORDER — TAMSULOSIN HYDROCHLORIDE 0.4 MG/1
0.4 CAPSULE ORAL AT BEDTIME
Refills: 0 | Status: DISCONTINUED | OUTPATIENT
Start: 2019-07-01 | End: 2019-07-03

## 2019-07-01 RX ORDER — HEPARIN SODIUM 5000 [USP'U]/ML
5000 INJECTION INTRAVENOUS; SUBCUTANEOUS ONCE
Refills: 0 | Status: COMPLETED | OUTPATIENT
Start: 2019-07-01 | End: 2019-07-01

## 2019-07-01 RX ORDER — OXYCODONE HYDROCHLORIDE 5 MG/1
2.5 TABLET ORAL EVERY 4 HOURS
Refills: 0 | Status: DISCONTINUED | OUTPATIENT
Start: 2019-07-01 | End: 2019-07-03

## 2019-07-01 RX ORDER — GABAPENTIN 400 MG/1
800 CAPSULE ORAL ONCE
Refills: 0 | Status: COMPLETED | OUTPATIENT
Start: 2019-07-01 | End: 2019-07-02

## 2019-07-01 RX ORDER — FUROSEMIDE 40 MG
20 TABLET ORAL DAILY
Refills: 0 | Status: DISCONTINUED | OUTPATIENT
Start: 2019-07-01 | End: 2019-07-02

## 2019-07-01 RX ORDER — GLUCAGON INJECTION, SOLUTION 0.5 MG/.1ML
1 INJECTION, SOLUTION SUBCUTANEOUS ONCE
Refills: 0 | Status: DISCONTINUED | OUTPATIENT
Start: 2019-07-01 | End: 2019-07-03

## 2019-07-01 RX ORDER — DEXTROSE 50 % IN WATER 50 %
15 SYRINGE (ML) INTRAVENOUS ONCE
Refills: 0 | Status: DISCONTINUED | OUTPATIENT
Start: 2019-07-01 | End: 2019-07-03

## 2019-07-01 RX ORDER — DEXTROSE 50 % IN WATER 50 %
25 SYRINGE (ML) INTRAVENOUS ONCE
Refills: 0 | Status: DISCONTINUED | OUTPATIENT
Start: 2019-07-01 | End: 2019-07-03

## 2019-07-01 RX ORDER — ALPRAZOLAM 0.5 MG/1
0.5 TABLET ORAL
Qty: 90 | Refills: 0 | Status: ACTIVE | COMMUNITY
Start: 2017-06-12 | End: 1900-01-01

## 2019-07-01 RX ORDER — ACETAMINOPHEN 500 MG
650 TABLET ORAL EVERY 6 HOURS
Refills: 0 | Status: DISCONTINUED | OUTPATIENT
Start: 2019-07-01 | End: 2019-07-03

## 2019-07-01 RX ORDER — SODIUM CHLORIDE 9 MG/ML
1000 INJECTION INTRAMUSCULAR; INTRAVENOUS; SUBCUTANEOUS
Refills: 0 | Status: DISCONTINUED | OUTPATIENT
Start: 2019-07-01 | End: 2019-07-01

## 2019-07-01 RX ORDER — DOCUSATE SODIUM 100 MG
100 CAPSULE ORAL THREE TIMES A DAY
Refills: 0 | Status: DISCONTINUED | OUTPATIENT
Start: 2019-07-01 | End: 2019-07-03

## 2019-07-01 RX ORDER — DEXTROSE 50 % IN WATER 50 %
12.5 SYRINGE (ML) INTRAVENOUS ONCE
Refills: 0 | Status: DISCONTINUED | OUTPATIENT
Start: 2019-07-01 | End: 2019-07-03

## 2019-07-01 RX ORDER — ALBUTEROL 90 UG/1
2 AEROSOL, METERED ORAL EVERY 6 HOURS
Refills: 0 | Status: DISCONTINUED | OUTPATIENT
Start: 2019-07-01 | End: 2019-07-03

## 2019-07-01 RX ORDER — SIMVASTATIN 20 MG/1
20 TABLET, FILM COATED ORAL AT BEDTIME
Refills: 0 | Status: DISCONTINUED | OUTPATIENT
Start: 2019-07-01 | End: 2019-07-03

## 2019-07-01 RX ORDER — HYDROMORPHONE HYDROCHLORIDE 2 MG/ML
2 INJECTION INTRAMUSCULAR; INTRAVENOUS; SUBCUTANEOUS ONCE
Refills: 0 | Status: DISCONTINUED | OUTPATIENT
Start: 2019-07-01 | End: 2019-07-01

## 2019-07-01 RX ADMIN — TAMSULOSIN HYDROCHLORIDE 0.4 MILLIGRAM(S): 0.4 CAPSULE ORAL at 23:00

## 2019-07-01 RX ADMIN — SENNA PLUS 2 TABLET(S): 8.6 TABLET ORAL at 22:51

## 2019-07-01 RX ADMIN — Medication 100 MILLIGRAM(S): at 22:51

## 2019-07-01 RX ADMIN — AMLODIPINE BESYLATE 5 MILLIGRAM(S): 2.5 TABLET ORAL at 18:13

## 2019-07-01 RX ADMIN — SIMVASTATIN 20 MILLIGRAM(S): 20 TABLET, FILM COATED ORAL at 22:52

## 2019-07-01 RX ADMIN — HYDROMORPHONE HYDROCHLORIDE 2 MILLIGRAM(S): 2 INJECTION INTRAMUSCULAR; INTRAVENOUS; SUBCUTANEOUS at 22:51

## 2019-07-01 NOTE — CONSULT NOTE ADULT - SUBJECTIVE AND OBJECTIVE BOX
77 yo male with PMH hypertension, hyperlipidemia, hypothyroidism, COPD/Empysema, CKD, DM2, vocal cord CA s/p RT 1996, lung cancer s/p chemo and RT, lung cancer metastasized to the right humerus confirmed by PET/CT.  Pt is scheduled for exploration localized resection internal fixation right humerus on 7/2/19 with Dr. Richard. However, patient was found to be hyponatremic at presurgical testing June 26th at 124, now 128. Typically 32 oz water intake daily along with coffee, soda which has characterized intake for several years. Lower intake of fluids for last day or so. Endorses chronic right shoulder pain, denies nausea, vomiting, diarrhea. Placed on lasix for last several weeks in attempt to control LE edema. Few months ago, able to walk >2 blocks with no limiting cp, or sob. Since pain med regimen for shoulder begun, pt ambulates less as he feels less steady on feet. Denies h/o cad/chf/ti/ckd. Pt with h/o ckd, iddm    Review of Systems:   CONSTITUTIONAL: No fever  EYES: No eye pain, visual disturbances, or discharge  ENMT:  No difficulty hearing, tinnitus, vertigo; No sinus or throat pain  NECK: No pain or stiffness  RESPIRATORY: No shortness of breath  CARDIOVASCULAR: No chest pain, palpitations, dizziness, or leg swelling  GASTROINTESTINAL: No abdominal or epigastric pain. No nausea, vomiting, or hematemesis; No diarrhea or constipation. No melena or hematochezia.  GENITOURINARY: No dysuria, frequency, hematuria, or incontinence  NEUROLOGICAL: No headaches  SKIN: No itching, burning, rashes, or lesions   MUSCULOSKELETAL: right shoulder pain      PHYSICAL EXAM:      Constitutional: NAD  HEENT:  EOMI, Normal Hearing  Neck: No LAD, No JVD  Back: Normal spine flexure, No CVA tenderness  Respiratory: CTAB  Cardiovascular: S1 and S2, RRR  Gastrointestinal: BS+, soft, NT/ND  Extremities: 2+ pitting  edema  Vascular: 2+ peripheral pulses  Neurological: alert and oriented, follows commands  Psychiatric: Normal mood, normal affect  Musculoskeletal: 5/5 strength b/l upper and lower extremities, save for right arm 2/2 shoulder pain  Skin: No rashes                            9.4    10.25 )-----------( 310      ( 01 Jul 2019 17:52 )             29.8     07-01    128<L>  |  91<L>  |  35<H>  ----------------------------<  194<H>  4.6   |  23  |  1.42<H>    Ca    9.1      01 Jul 2019 17:52  Mg     1.9     07-01      CAPILLARY BLOOD GLUCOSE      POCT Blood Glucose.: 185 mg/dL (01 Jul 2019 13:59)    PT/INR - ( 01 Jul 2019 17:52 )   PT: 15.1 SEC;   INR: 1.35          PTT - ( 01 Jul 2019 17:52 )  PTT:29.2 SEC    Vital Signs Last 24 Hrs  T(C): 36.9 (01 Jul 2019 18:24), Max: 36.9 (01 Jul 2019 18:24)  T(F): 98.4 (01 Jul 2019 18:24), Max: 98.4 (01 Jul 2019 18:24)  HR: 62 (01 Jul 2019 19:17) (62 - 77)  BP: 123/49 (01 Jul 2019 19:17) (123/49 - 164/71)  BP(mean): --  RR: 18 (01 Jul 2019 19:17) (18 - 18)  SpO2: 94% (01 Jul 2019 19:17) (94% - 98%) 79 yo male with PMH hypertension, hyperlipidemia, hypothyroidism, COPD/Empysema, CKD, DM2, vocal cord CA s/p RT 1996, lung cancer s/p chemo and RT, lung cancer metastasized to the right humerus confirmed by PET/CT.  Pt is scheduled for exploration localized resection internal fixation right humerus on 7/2/19 with Dr. Richard. However, patient was found to be hyponatremic at presurgical testing June 26th at 124, now 128. Typically 32 oz water intake daily along with coffee, soda which has characterized intake for several years. Lower intake of fluids for last day or so. CXR reviewed, consistent with fluid overload. Patient/ spouse deny cardiac history. No TTE on [employer].  Endorses chronic right shoulder pain, denies nausea, vomiting, diarrhea. Placed on lasix for last several weeks in attempt to control LE edema. Few months ago, able to walk >2 blocks with no limiting cp, or sob. Since pain med regimen for shoulder begun, pt ambulates less as he feels less steady on feet. Denies h/o cad/chf/ti/ckd. Pt with h/o ckd, iddm    Review of Systems:   CONSTITUTIONAL: No fever  EYES: No eye pain, visual disturbances, or discharge  ENMT:  No difficulty hearing, tinnitus, vertigo; No sinus or throat pain  NECK: No pain or stiffness  RESPIRATORY: No shortness of breath  CARDIOVASCULAR: No chest pain, palpitations, dizziness, or leg swelling  GASTROINTESTINAL: No abdominal or epigastric pain. No nausea, vomiting, or hematemesis; No diarrhea or constipation. No melena or hematochezia.  GENITOURINARY: No dysuria, frequency, hematuria, or incontinence  NEUROLOGICAL: No headaches  SKIN: No itching, burning, rashes, or lesions   MUSCULOSKELETAL: right shoulder pain      PHYSICAL EXAM:      Constitutional: NAD  HEENT:  EOMI, Normal Hearing  Neck: No LAD, No JVD  Back: Normal spine flexure, No CVA tenderness  Respiratory: CTAB, save fort mild decrease bibasilar breath sounds  Cardiovascular: S1 and S2, RRR  Gastrointestinal: BS+, soft, NT/ND  Extremities: 2+ pitting  edema  Vascular: 2+ peripheral pulses  Neurological: alert and oriented, follows commands  Psychiatric: Normal mood, normal affect  Musculoskeletal: 5/5 strength b/l upper and lower extremities, save for right arm 2/2 shoulder pain  Skin: No rashes                            9.4    10.25 )-----------( 310      ( 01 Jul 2019 17:52 )             29.8     07-01    128<L>  |  91<L>  |  35<H>  ----------------------------<  194<H>  4.6   |  23  |  1.42<H>    Ca    9.1      01 Jul 2019 17:52  Mg     1.9     07-01      CAPILLARY BLOOD GLUCOSE      POCT Blood Glucose.: 185 mg/dL (01 Jul 2019 13:59)    PT/INR - ( 01 Jul 2019 17:52 )   PT: 15.1 SEC;   INR: 1.35          PTT - ( 01 Jul 2019 17:52 )  PTT:29.2 SEC    Vital Signs Last 24 Hrs  T(C): 36.9 (01 Jul 2019 18:24), Max: 36.9 (01 Jul 2019 18:24)  T(F): 98.4 (01 Jul 2019 18:24), Max: 98.4 (01 Jul 2019 18:24)  HR: 62 (01 Jul 2019 19:17) (62 - 77)  BP: 123/49 (01 Jul 2019 19:17) (123/49 - 164/71)  BP(mean): --  RR: 18 (01 Jul 2019 19:17) (18 - 18)  SpO2: 94% (01 Jul 2019 19:17) (94% - 98%)

## 2019-07-01 NOTE — H&P ADULT - HISTORY OF PRESENT ILLNESS
Orthopaedic Surgery History and Physical    77 yo male with PMH hypertension, hyperlipidemia, hypothyroidism, COPD/Empysema, CKD, DM2, vocal cord CA s/p RT 1996, lung cancer s/p chemo and RT, lung cancer metastasized to the right humerus confirmed by PET/CT.  Pt is scheduled for exploration localized resection internal fixation right humerus on 7/2/19 with Dr. Richard. However, patient was found to be hyponatremic at presurgical testing. Dr. Richard sent pt to ED to be admitted for correction of hyponatremia prior to surgery. Patient denies any recent trauma. Denies headache, fever, chills. Pt states pain is currently well controlled.     PAST MEDICAL & SURGICAL HISTORY:  Anxiety  Constricted pupils: for several years states he was seen by many eye doctors no cause known  Hypertension  Sleep apnea: 2012 done in [place] studies  no longer using CPAP  High cholesterol  Anxiety: before procedures  ETOH abuse: last drink 28 yrs ago   AA meeting weekly  Vocal cord cancer: 1996 treated with chemo  Hypothyroid  Diabetes: type 2  Lung nodule: right 2016   repeat ct scan 2017 increased  size  Mets to right humerus bone  Cancer of vocal cord: 1996 biopsy  S/P cholecystectomy  S/P knee replacement: bilateral, 2011    [] No significant past history as reviewed with the patient and family    FAMILY HISTORY:  Family history of esophageal cancer (Father)  Family history of liver disease (Mother)    [] Family history not pertinent as reviewed with the patient and family    SOCIAL HISTORY:    MEDICATIONS  (STANDING):    MEDICATIONS  (PRN):    Allergies    No Known Allergies    Intolerances        REVIEW OF SYSTEMS  [x] All review of systems negative except for those marked.  Systemic:	[] Fever		[] Chills		[] Night sweats		[] Fatigue	[] Other  [] Cardiovascular:  [] Pulmonary:  [] Renal/Urologic:  [] Gastrointestinal:  [] Metabolic:  [] Neurologic:  [] Hematologic:  [] ENT:  [] Ophthalmologic:  [] Musculoskeletal: see HPI    Vital Signs Last 24 Hrs  T(C): 36.7 (01 Jul 2019 13:56), Max: 36.7 (01 Jul 2019 13:56)  T(F): 98.1 (01 Jul 2019 13:56), Max: 98.1 (01 Jul 2019 13:56)  HR: 77 (01 Jul 2019 13:56) (77 - 77)  BP: 164/71 (01 Jul 2019 13:56) (164/71 - 164/71)  BP(mean): --  RR: 18 (01 Jul 2019 13:56) (18 - 18)  SpO2: 97% (01 Jul 2019 13:56) (97% - 97%)      PHYSICAL EXAM:  NAD  RLE: skin closed, TTP over midshaft humerus  +M/R/U/MSc/Ax  +SILT M/R/U/Msc/Ax  2+ Rad, WWP

## 2019-07-01 NOTE — ED PROVIDER NOTE - OBJECTIVE STATEMENT
79 yo male with PMH hypertension, hyperlipidemia, hypothyroidism, COPD/Empysema, CKD, DM2, vocal cord CA s/p RT 1996, lung cancer s/p chemo and RT, lung cancer metastasized to the right humerus confirmed by PET/CT.  Pt is scheduled for exploration localized resection internal fixation right humerus on 7/2/19 with Dr. Richard. However, patient was found to be hyponatremic at presurgical testing.  Patient denies any recent trauma.  Denies headache, fever, chills, chest pain, shortness of breath, abdominal pain, dysuria.

## 2019-07-01 NOTE — RESULTS/DATA
[] : results reviewed [de-identified] : hga1c is 6.6. cr. 1.57, na 124 [de-identified] : Pet scan, right upper lobe mass, right humeral increased activity [de-identified] : nsr incomplete rbbb

## 2019-07-01 NOTE — ED PROVIDER NOTE - PMH
Anxiety    Anxiety  before procedures  Constricted pupils  for several years states he was seen by many eye doctors no cause known  Diabetes  type 2  ETOH abuse  last drink 28 yrs ago   AA meeting weekly  High cholesterol    Hypertension    Hypothyroid    Lung nodule  right 2016   repeat ct scan 2017 increased  size  Mets to right humerus bone  Sleep apnea  2012 done in [place] studies  no longer using CPAP  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

## 2019-07-01 NOTE — H&P ADULT - ASSESSMENT
78y Male admitted for preoperative management of hyponatremia prior to scheduled ORIF R humerus fx for metastatic lung cancer to bone  - admit to orthopaedic surgery under Dr. Richard  - Monitor hyponatremia  -  medicine recs and clearance  -  renal labs  - Preop labs in ED CBC/BMP/T&S/Coags  - CXR/EKG in ED  - Pain control - c/w home pain regimen

## 2019-07-01 NOTE — ED PROVIDER NOTE - ATTENDING CONTRIBUTION TO CARE
I, Jennifer Cabot, MD, have performed a history and physical exam of the patient and discussed their management with the resident. I reviewed the resident's note and agree with the documented findings and plan of care. My medical decision making and observations are found above.    Cabot: 78M with PMH of hypertension, hyperlipidemia, hypothyroidism, COPD/Empysema, CKD, DM2, vocal cord CA s/p RT 1996, lung cancer s/p chemo and RT, sent in for Na 124 in the preop eval for ORIF of humerus.  No neuro sx.  On exam, HDS, NAD, MMM, eyes clear, lungs CTAB, heart sounds normal, abd soft, NT, ND, no CVAT, LEs without edema, wwp, skin normal temperature and color, neuro: alert and oriented, no focal deficits.  Will recheck labs and admit. I, Jennifer Cabot, MD, have performed a history and physical exam of the patient and discussed their management with the resident. I reviewed the resident's note and agree with the documented findings and plan of care. My medical decision making and observations are found above.    Cabot: 78M with PMH of hypertension, hyperlipidemia, hypothyroidism, COPD/Empysema, CKD, DM2, vocal cord CA s/p RT 1996, lung cancer s/p chemo and RT, sent in for Na 124 in the preop eval for ORIF of humerus.  No neuro sx.  On exam, HDS, NAD, MMM, eyes clear, lungs CTAB, heart sounds normal, abd soft, NT, ND, no CVAT, LEs without edema, wwp, skin normal temperature and color, neuro: alert and oriented, symmetric facial movements, moves all extremities.  Will recheck labs and admit.

## 2019-07-01 NOTE — ASSESSMENT
[Patient NOT optimized for Surgery at this time] : Patient not optimized for surgery at this time [Other: _____] : [unfilled] [FreeTextEntry4] : Surgery has to be cancelled has hyponatremia likely related to Lung Cancer\par willl refer to Nephro for follow up and treatment.   Surgery office contacted,

## 2019-07-01 NOTE — ED ADULT NURSE NOTE - OBJECTIVE STATEMENT
Pt received in intake room 2, Pt a&OX4. Pt has hx of lung CA with bone METS to RT arm and shoulder. Pt is scheduled to get surgery on RT arm Tuesday 7/2/19 and preop labs came back with sodium of 124, pt told to report to ED. Pt has limited mobility to RT arm due to pain. Pt receives immunotherapy once a month, last therapy was June. 20G placed in LT AC, labs collected and sent. Medications given as per MD order.

## 2019-07-01 NOTE — CONSULT NOTE ADULT - SUBJECTIVE AND OBJECTIVE BOX
HISTORY OF PRESENT ILLNESS:  Patient is a 78y old  Male who presents with a chief complaint of Hyponatremia (01 Jul 2019 19:32)    HPI: 77 yo male with PMH hypertension, hyperlipidemia, hypothyroidism, COPD/Empysema, CKD, DM2, vocal cord CA s/p RT 1996, lung cancer s/p chemo and RT, lung cancer metastasized to the right humerus confirmed by PET/CT.  Pt is scheduled for exploration localized resection internal fixation right humerus on 7/2/19 with Dr. Richard. However, patient was found to be hyponatremic at presurgical testing June 26th at 124, now 128. Admitted for correction of hyponatremia prior to surgery. In ER, CXR showed mild pulmonary edema with pleural effusions. Patient and family denies cardiac history. Patient was started on Lasix for last few weeks for LE edema. Cardiology consulted for pulmonary edema.     Allergies  No Known Allergies  	  MEDICATIONS  tamsulosin 0.4 milliGRAM(s) Oral at bedtime  ALBUTerol    90 MICROgram(s) HFA Inhaler 2 Puff(s) Inhalation every 6 hours PRN  acetaminophen   Tablet .. 650 milliGRAM(s) Oral every 6 hours PRN  fentaNYL   Patch  75 MICROgram(s)/Hr 1 Patch Transdermal every 72 hours  gabapentin 800 milliGRAM(s) Oral Once  HYDROmorphone   Tablet 2 milliGRAM(s) Oral Once  oxyCODONE    IR 2.5 milliGRAM(s) Oral every 4 hours PRN  oxyCODONE    IR 5 milliGRAM(s) Oral every 4 hours PRN  docusate sodium 100 milliGRAM(s) Oral three times a day  senna 2 Tablet(s) Oral at bedtime  dextrose 40% Gel 15 Gram(s) Oral once PRN  dextrose 50% Injectable 12.5 Gram(s) IV Push once  dextrose 50% Injectable 25 Gram(s) IV Push once  dextrose 50% Injectable 25 Gram(s) IV Push once  glucagon  Injectable 1 milliGRAM(s) IntraMuscular once PRN  insulin lispro (HumaLOG) corrective regimen sliding scale   SubCutaneous every 6 hours  levothyroxine 112 MICROGram(s) Oral daily  simvastatin 20 milliGRAM(s) Oral at bedtime  dextrose 5%. 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  Hypothyroid  Diabetes: type 2  Lung nodule: right 2016   repeat ct scan 2017 increased  size  Mets to right humerus bone  Cancer of vocal cord: 1996 biopsy  S/P cholecystectomy  S/P knee replacement: bilateral, 2011    FAMILY HISTORY:  Family history of esophageal cancer  Family history of liver disease    SOCIAL HISTORY  Marital Status:   Occupation:   Lives with:     SUBSTANCE USE  Tobacco Usage:  ( ) never smoked   ( ) former smoker  ( ) current smoker; Packs per day:   Alcohol Usage: ( ) none  ( ) occasional ( ) 2-3 times a week ( ) daily; Last drink:   Recreational drugs ( ) None    REVIEW OF SYSTEMS:  CONSTITUTIONAL: No fevers, No chills, No fatigue, No weight gain  EYES: No vision changes   ENT: No congestion, No ear pain, No sore throat.  NECK: No pain, No stiffness  RESPIRATORY: No shortness of breath, No cough, No wheezing, No hemoptysis  CARDIOVASCULAR: No chest pain. No palpitations, No BLAKELY, No orthopnea, No paroxysmal nocturnal dyspnea, No pleuritic pain  GASTROINTESTINAL: No abdominal pain, No nausea, No vomiting, No hematemesis, No diarrhea No constipation. No melena  GENITOURINARY: No dysuria, No frequency, No incontinence, No hematuria  NEUROLOGICAL: No dizziness, No lightheadedness, No syncope, No LOC, No headache, No numbness or weakness  EXTREMITIES: No Edema, No joint pain, No joint swelling.  PSYCHIATRIC: No anxiety, No depression  SKIN: No diaphoresis. No itching, No rashes, No pressure ulcers  ENDOCRINE: No Diabetes, No hypo/hyperthyroidism, No hyperparathyroid  HEME: No cancer, No anemia    All other review of systems is negative unless indicated above.  VITAL SIGNS  T(C): 36.9 (07-01-19 @ 18:24), Max: 36.9 (07-01-19 @ 18:24)  HR: 62 (07-01-19 @ 19:17) (62 - 77)  BP: 123/49 (07-01-19 @ 19:17) (123/49 - 164/71)  RR: 18 (07-01-19 @ 19:17) (18 - 18)  SpO2: 94% (07-01-19 @ 19:17) (94% - 98%)  Wt(kg): --    PHYSICAL EXAM:  Appearance: NAD, no distress  HEENT:   Normal oral mucosa, PERRL, EOMI  Cardiovascular: Regular rate and rhythm, Normal S1 S2, No JVD, No murmurs  Respiratory: Lungs clear to auscultation. No rales, No rhonchi, No wheezing. No tenderness to palpation  Gastrointestinal:  Soft, Non-tender, + BS  Neurologic: Non-focal, A&Ox3  Skin: Warm and dry, No rashes, No ecchymosis, No cyanosis  Extremities: No clubbing, No cyanosis, No edema  Vascular: Peripheral pulses palpable 2+ bilaterally  Psychiatry: Mood & affect appropriate    LABORATORY VALUES	 	                    9.4    10.25 )-----------( 310      ( 01 Jul 2019 17:52 )             29.8   07-01    128<L>  |  91<L>  |  35<H>  ----------------------------<  194<H>  4.6   |  23  |  1.42<H>    Ca    9.1      01 Jul 2019 17:52  Mg     1.9     07-01  Prothrombin Time, Plasma: 15.1 SEC (07-01 @ 17:52)    CARDIAC MARKERS:  Hemoglobin A1C, Whole Blood: 6.6 % (06-26 @ 10:05)  POCT Blood Glucose.: 185 mg/dL (01 Jul 2019 13:59)    TELEMETRY: 	    ECG:  	  RADIOLOGY: < from: Xray Chest 1 View- PORTABLE-Urgent (07.01.19 @ 19:11) > INTERPRETATION:  Pulmonary edema with small bilateral pleural effusions.  < from: NM PET/CT Onc FDG Skull to Thigh, Subsq (06.17.19 @ 14:08) > IMPRESSION: Since FDG-PET/CT scan 3/14/2019: Slightly decreased FDG activity in proximal right humeral diaphyseal  metastasis and unchanged size of focal anterior cortical disruption. Decreased size and FDG avidity of right paratracheal/hilar lymph nodes. Unchanged subcentimeter spiculated right upper lobe opacity, too small  for PET characterization, malignancy cannot be excluded.  < from: CT Chest w/o Cont (04.09.19 @ 12:52) > IMPRESSION: 8 mm right upper lobe irregular nodular opacity unchanged  since March 14, 2019 may represent a primary lung neoplasm given its  appearance. Previously described right upper lobe peripheral opacity is unchanged  since March 14, 2019.  < from: Xray Chest 2 Views PA/Lat (04.04.19 @ 14:01) > Patchy opacities in the right upper, right lower, and left lower lung  concerning for multifocal pneumonia. Question small right pleural effusion.  OTHER: 	    PREVIOUS DIAGNOSTIC TESTING:    [ ] Echocardiogram: None  [ ] Catheterization: None   [ ] Stress Test:  None     ASSESSMENT AND PLAN  77 yo male with PMH hypertension, hyperlipidemia, hypothyroidism, COPD/ Empysema, CKD, DM2, vocal cord CA s/p RT 1996, lung cancer s/p chemo and RT, lung cancer metastasized to the right humerus confirmed by PET/CT presented for hyponatremia correction prior to exploration of localized resection internal fixation right humerus on 7/2/19 Cardiology consulted for pulmonary edema and pleural effusions in CXR.     PLAN  Patient denies any cardiac history.         # [id number] HISTORY OF PRESENT ILLNESS:  Patient is a 78y old  Male who presents with a chief complaint of Hyponatremia (01 Jul 2019 19:32)    HPI: 79 yo male with PMH hypertension, hyperlipidemia, hypothyroidism, COPD/Empysema, CKD, DM2, vocal cord CA s/p RT 1996, lung cancer s/p chemo and RT, lung cancer metastasized to the right humerus confirmed by PET/CT.  Pt is scheduled for exploration localized resection internal fixation right humerus on 7/2/19 with Dr. Richard. However, patient was found to be hyponatremic at presurgical testing June 26th at 124, now 128. Admitted for correction of hyponatremia prior to surgery. In ER, CXR showed mild pulmonary edema with pleural effusions. Patient and family denies cardiac history. Patient was started on Lasix for last 3-4 months of LE edema. Cardiology consulted for pulmonary edema.     Allergies  No Known Allergies  	  MEDICATIONS  tamsulosin 0.4 milliGRAM(s) Oral at bedtime  ALBUTerol    90 MICROgram(s) HFA Inhaler 2 Puff(s) Inhalation every 6 hours PRN  acetaminophen   Tablet .. 650 milliGRAM(s) Oral every 6 hours PRN  fentaNYL   Patch  75 MICROgram(s)/Hr 1 Patch Transdermal every 72 hours  gabapentin 800 milliGRAM(s) Oral Once  HYDROmorphone   Tablet 2 milliGRAM(s) Oral Once  oxyCODONE    IR 2.5 milliGRAM(s) Oral every 4 hours PRN  oxyCODONE    IR 5 milliGRAM(s) Oral every 4 hours PRN  docusate sodium 100 milliGRAM(s) Oral three times a day  senna 2 Tablet(s) Oral at bedtime  dextrose 40% Gel 15 Gram(s) Oral once PRN  dextrose 50% Injectable 12.5 Gram(s) IV Push once  dextrose 50% Injectable 25 Gram(s) IV Push once  dextrose 50% Injectable 25 Gram(s) IV Push once  glucagon  Injectable 1 milliGRAM(s) IntraMuscular once PRN  insulin lispro (HumaLOG) corrective regimen sliding scale   SubCutaneous every 6 hours  levothyroxine 112 MICROGram(s) Oral daily  simvastatin 20 milliGRAM(s) Oral at bedtime  dextrose 5%. 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  Hypothyroid  Diabetes: type 2  Lung nodule: right 2016   repeat ct scan 2017 increased  size  Mets to right humerus bone  Cancer of vocal cord: 1996 biopsy  S/P cholecystectomy  S/P knee replacement: bilateral, 2011    FAMILY HISTORY:  Family history of esophageal cancer  Family history of liver disease    SOCIAL HISTORY  Marital Status:   Occupation: retired   Lives with: wife     SUBSTANCE USE  Tobacco Usage:  ( ) never smoked   (x ) former smoker  ( ) current smoker; Packs per day: 2 ppd x 30 days   Alcohol Usage: (x ) none  ( ) occasional ( ) 2-3 times a week ( ) daily; Last drink:   Recreational drugs (x ) None    REVIEW OF SYSTEMS:  CONSTITUTIONAL: No fevers, No chills, No fatigue, No weight gain  EYES: No vision changes   ENT: No congestion, No ear pain, No sore throat.  NECK: No pain, No stiffness  RESPIRATORY: No shortness of breath, No cough, No wheezing, No hemoptysis  CARDIOVASCULAR: No chest pain. No palpitations, No BLAKELY, No orthopnea, No paroxysmal nocturnal dyspnea, No pleuritic pain  GASTROINTESTINAL: No abdominal pain, No nausea, No vomiting, No hematemesis, No diarrhea No constipation. No melena  GENITOURINARY: No dysuria, No frequency, No incontinence, No hematuria  NEUROLOGICAL: No dizziness, No lightheadedness, No syncope, No LOC, No headache, No numbness or weakness  EXTREMITIES: No Edema, No joint pain, No joint swelling.  PSYCHIATRIC: No anxiety, No depression  SKIN: No diaphoresis. No itching, No rashes, No pressure ulcers  ENDOCRINE: No Diabetes, No hypo/hyperthyroidism, No hyperparathyroid  HEME: No cancer, No anemia    All other review of systems is negative unless indicated above.  VITAL SIGNS  T(C): 36.9 (07-01-19 @ 18:24), Max: 36.9 (07-01-19 @ 18:24)  HR: 62 (07-01-19 @ 19:17) (62 - 77)  BP: 123/49 (07-01-19 @ 19:17) (123/49 - 164/71)  RR: 18 (07-01-19 @ 19:17) (18 - 18)  SpO2: 94% (07-01-19 @ 19:17) (94% - 98%)  Wt(kg): --    PHYSICAL EXAM:  Appearance: NAD, no distress  HEENT:   Normal oral mucosa, PERRL, EOMI  Cardiovascular: Regular rate and rhythm, Normal S1 S2, No JVD, No murmurs  Respiratory: Lungs clear to auscultation. No rales, No rhonchi, No wheezing. No tenderness to palpation  Gastrointestinal:  Soft, Non-tender, + BS  Neurologic: Non-focal, A&Ox3  Skin: Warm and dry, No rashes, No ecchymosis, No cyanosis  Extremities: No clubbing, No cyanosis, No edema  Vascular: Peripheral pulses palpable 2+ bilaterally  Psychiatry: Mood & affect appropriate    LABORATORY VALUES	 	                    9.4    10.25 )-----------( 310      ( 01 Jul 2019 17:52 )             29.8   07-01    128<L>  |  91<L>  |  35<H>  ----------------------------<  194<H>  4.6   |  23  |  1.42<H>    Ca    9.1      01 Jul 2019 17:52  Mg     1.9     07-01  Prothrombin Time, Plasma: 15.1 SEC (07-01 @ 17:52)    CARDIAC MARKERS:  Hemoglobin A1C, Whole Blood: 6.6 % (06-26 @ 10:05)  POCT Blood Glucose.: 185 mg/dL (01 Jul 2019 13:59)    TELEMETRY: 	    ECG:  	  RADIOLOGY: < from: Xray Chest 1 View- PORTABLE-Urgent (07.01.19 @ 19:11) > INTERPRETATION:  Pulmonary edema with small bilateral pleural effusions.  < from: NM PET/CT Onc FDG Skull to Thigh, Subsq (06.17.19 @ 14:08) > IMPRESSION: Since FDG-PET/CT scan 3/14/2019: Slightly decreased FDG activity in proximal right humeral diaphyseal  metastasis and unchanged size of focal anterior cortical disruption. Decreased size and FDG avidity of right paratracheal/hilar lymph nodes. Unchanged subcentimeter spiculated right upper lobe opacity, too small  for PET characterization, malignancy cannot be excluded.  < from: CT Chest w/o Cont (04.09.19 @ 12:52) > IMPRESSION: 8 mm right upper lobe irregular nodular opacity unchanged  since March 14, 2019 may represent a primary lung neoplasm given its  appearance. Previously described right upper lobe peripheral opacity is unchanged  since March 14, 2019.  < from: Xray Chest 2 Views PA/Lat (04.04.19 @ 14:01) > Patchy opacities in the right upper, right lower, and left lower lung  concerning for multifocal pneumonia. Question small right pleural effusion.  OTHER: 	    PREVIOUS DIAGNOSTIC TESTING:    [ ] Echocardiogram: None  [ ] Catheterization: None   [ ] Stress Test:  None     ASSESSMENT AND PLAN  79 yo male with PMH hypertension, hyperlipidemia, hypothyroidism, COPD/ Empysema, CKD, DM2, vocal cord CA s/p RT 1996, lung cancer s/p chemo and RT, lung cancer metastasized to the right humerus confirmed by PET/CT presented for hyponatremia correction prior to exploration of localized resection internal fixation right humerus on 7/2/19 Cardiology consulted for pulmonary edema and pleural effusions in CXR.     PLAN  Patient denies any cardiac history. Currently asymptomatic and denies any cardiac complaints.   Denies SOB, orthopnea, states his ambulation ability declined since diagnosis and takes 1 pill Lasix a day for leg swelling with not much improvement.   Patient will need ECHO in am to r/o any cardiac failure.   Please sent BNP and Troponin.   House cardiology will follow.   Continuous cardiac monitoring to r/o arrhythmias.   Diuresis: Lasix 20 mg IVP daily  Trend BMP 2/2 diuresis and replete as needed  Daily weight monitoring, Strict I/O. Agree with fluid restriction   Check ECHO to evaluate LV/RV function and valvular abnormalities    # [id number] HISTORY OF PRESENT ILLNESS:  Patient is a 78y old  Male who presents with a chief complaint of Hyponatremia (01 Jul 2019 19:32)    HPI: 79 yo male with PMH hypertension, hyperlipidemia, hypothyroidism, COPD/Empysema, CKD, DM2, vocal cord CA s/p RT 1996, lung cancer s/p chemo and RT, lung cancer metastasized to the right humerus confirmed by PET/CT.  Pt is scheduled for exploration localized resection internal fixation right humerus on 7/2/19 with Dr. Richard. However, patient was found to be hyponatremic at presurgical testing June 26th at 124, now 128. Admitted for correction of hyponatremia prior to surgery. In ER, CXR showed mild pulmonary edema with pleural effusions. Patient and family denies cardiac history. Patient was started on Lasix for last 3-4 months of LE edema. Cardiology consulted for pulmonary edema. Patient denies any cardiac history. Currently asymptomatic and denies any cardiac complaints.     Allergies  No Known Allergies  	  MEDICATIONS  tamsulosin 0.4 milliGRAM(s) Oral at bedtime  ALBUTerol    90 MICROgram(s) HFA Inhaler 2 Puff(s) Inhalation every 6 hours PRN  acetaminophen   Tablet .. 650 milliGRAM(s) Oral every 6 hours PRN  fentaNYL   Patch  75 MICROgram(s)/Hr 1 Patch Transdermal every 72 hours  gabapentin 800 milliGRAM(s) Oral Once  HYDROmorphone   Tablet 2 milliGRAM(s) Oral Once  oxyCODONE    IR 2.5 milliGRAM(s) Oral every 4 hours PRN  oxyCODONE    IR 5 milliGRAM(s) Oral every 4 hours PRN  docusate sodium 100 milliGRAM(s) Oral three times a day  senna 2 Tablet(s) Oral at bedtime  dextrose 40% Gel 15 Gram(s) Oral once PRN  dextrose 50% Injectable 12.5 Gram(s) IV Push once  dextrose 50% Injectable 25 Gram(s) IV Push once  dextrose 50% Injectable 25 Gram(s) IV Push once  glucagon  Injectable 1 milliGRAM(s) IntraMuscular once PRN  insulin lispro (HumaLOG) corrective regimen sliding scale   SubCutaneous every 6 hours  levothyroxine 112 MICROGram(s) Oral daily  simvastatin 20 milliGRAM(s) Oral at bedtime  dextrose 5%. 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  Hypothyroid  Diabetes: type 2  Lung nodule: right 2016   repeat ct scan 2017 increased  size  Mets to right humerus bone  Cancer of vocal cord: 1996 biopsy  S/P cholecystectomy  S/P knee replacement: bilateral, 2011    FAMILY HISTORY:  Family history of esophageal cancer  Family history of liver disease    SOCIAL HISTORY  Marital Status:   Occupation: retired   Lives with: wife     SUBSTANCE USE  Tobacco Usage:  ( ) never smoked   (x ) former smoker  ( ) current smoker; Packs per day: 2 ppd x 30 days   Alcohol Usage: (x ) none  ( ) occasional ( ) 2-3 times a week ( ) daily; Last drink:   Recreational drugs (x ) None    REVIEW OF SYSTEMS:  CONSTITUTIONAL: No fevers, No chills, No fatigue, No weight gain  EYES: No vision changes   ENT: No congestion, No ear pain, No sore throat.  NECK: No pain, No stiffness  RESPIRATORY: No shortness of breath, No cough, No wheezing, No hemoptysis  CARDIOVASCULAR: No chest pain. No palpitations, No BLAKELY, No orthopnea, No paroxysmal nocturnal dyspnea, No pleuritic pain  GASTROINTESTINAL: No abdominal pain, No nausea, No vomiting, No hematemesis, No diarrhea No constipation. No melena  GENITOURINARY: No dysuria, No frequency, No incontinence, No hematuria  NEUROLOGICAL: No dizziness, No lightheadedness, No syncope, No LOC, No headache, No numbness or weakness  EXTREMITIES: No Edema, No joint pain, No joint swelling.  PSYCHIATRIC: No anxiety, No depression  SKIN: No diaphoresis. No itching, No rashes, No pressure ulcers  ENDOCRINE: No Diabetes, No hypo/hyperthyroidism, No hyperparathyroid  HEME: No cancer, No anemia    All other review of systems is negative unless indicated above.  VITAL SIGNS  T(C): 36.9 (07-01-19 @ 18:24), Max: 36.9 (07-01-19 @ 18:24)  HR: 62 (07-01-19 @ 19:17) (62 - 77)  BP: 123/49 (07-01-19 @ 19:17) (123/49 - 164/71)  RR: 18 (07-01-19 @ 19:17) (18 - 18)  SpO2: 94% (07-01-19 @ 19:17) (94% - 98%)  Wt(kg): --    PHYSICAL EXAM:  Appearance: NAD, no distress  HEENT:   Normal oral mucosa, PERRL, EOMI  Cardiovascular: Regular rate and rhythm, Normal S1 S2, No JVD, No murmurs  Respiratory: Lungs clear to auscultation. No rales, No rhonchi, No wheezing. No tenderness to palpation  Gastrointestinal:  Soft, Non-tender, + BS  Neurologic: Non-focal, A&Ox3  Skin: Warm and dry, No rashes, No ecchymosis, No cyanosis  Extremities: No clubbing, No cyanosis, No edema  Vascular: Peripheral pulses palpable 2+ bilaterally  Psychiatry: Mood & affect appropriate    LABORATORY VALUES	 	                    9.4    10.25 )-----------( 310      ( 01 Jul 2019 17:52 )             29.8   07-01    128<L>  |  91<L>  |  35<H>  ----------------------------<  194<H>  4.6   |  23  |  1.42<H>    Ca    9.1      01 Jul 2019 17:52  Mg     1.9     07-01  Prothrombin Time, Plasma: 15.1 SEC (07-01 @ 17:52)    CARDIAC MARKERS:  Hemoglobin A1C, Whole Blood: 6.6 % (06-26 @ 10:05)  POCT Blood Glucose.: 185 mg/dL (01 Jul 2019 13:59)    TELEMETRY: 	    ECG:  	  RADIOLOGY: < from: Xray Chest 1 View- PORTABLE-Urgent (07.01.19 @ 19:11) > INTERPRETATION:  Pulmonary edema with small bilateral pleural effusions.  < from: NM PET/CT Onc FDG Skull to Thigh, Subsq (06.17.19 @ 14:08) > IMPRESSION: Since FDG-PET/CT scan 3/14/2019: Slightly decreased FDG activity in proximal right humeral diaphyseal  metastasis and unchanged size of focal anterior cortical disruption. Decreased size and FDG avidity of right paratracheal/hilar lymph nodes. Unchanged subcentimeter spiculated right upper lobe opacity, too small  for PET characterization, malignancy cannot be excluded.  < from: CT Chest w/o Cont (04.09.19 @ 12:52) > IMPRESSION: 8 mm right upper lobe irregular nodular opacity unchanged  since March 14, 2019 may represent a primary lung neoplasm given its  appearance. Previously described right upper lobe peripheral opacity is unchanged  since March 14, 2019.  < from: Xray Chest 2 Views PA/Lat (04.04.19 @ 14:01) > Patchy opacities in the right upper, right lower, and left lower lung  concerning for multifocal pneumonia. Question small right pleural effusion.  OTHER: 	    PREVIOUS DIAGNOSTIC TESTING:    [ ] Echocardiogram: None  [ ] Catheterization: None   [ ] Stress Test:  None     ASSESSMENT AND PLAN  79 yo male with PMH hypertension, hyperlipidemia, hypothyroidism, COPD/ Empysema, CKD, DM2, vocal cord CA s/p RT 1996, lung cancer s/p chemo and RT, lung cancer metastasized to the right humerus confirmed by PET/CT presented for hyponatremia correction prior to exploration of localized resection internal fixation right humerus on 7/2/19 Cardiology consulted for pulmonary edema and pleural effusions in CXR.     PLAN  Patient denies any cardiac history. Currently asymptomatic and denies any cardiac complaints.   Denies SOB, orthopnea, states his ambulation ability declined since diagnosis and takes 1 pill Lasix a day for leg swelling with not much improvement.   Patient will need ECHO in am to r/o any cardiac failure.   Please sent BNP and Troponin.   House cardiology will follow.   Continuous cardiac monitoring to r/o arrhythmias.   Diuresis: Lasix 20 mg IVP daily  Trend BMP 2/2 diuresis and replete as needed  Daily weight monitoring, Strict I/O. Agree with fluid restriction   Check ECHO to evaluate LV/RV function and valvular abnormalities    # [id number] HISTORY OF PRESENT ILLNESS:  Patient is a 78y old  Male who presents with a chief complaint of Hyponatremia (01 Jul 2019 19:32)    HPI: 79 yo male with PMH hypertension, hyperlipidemia, hypothyroidism, COPD/Empysema, CKD, DM2, vocal cord CA s/p RT 1996, lung cancer s/p chemo and RT, lung cancer metastasized to the right humerus confirmed by PET/CT.  Pt is scheduled for exploration localized resection internal fixation right humerus on 7/2/19 with Dr. Richard. However, patient was found to be hyponatremic at presurgical testing June 26th at 124, now 128. Admitted for correction of hyponatremia prior to surgery. In ER, CXR showed mild pulmonary edema with pleural effusions. Patient and family denies cardiac history. Patient was started on Lasix for last 3-4 months of LE edema. Cardiology consulted for pulmonary edema. Patient denies any cardiac history. Currently asymptomatic and denies any cardiac complaints.     Allergies  No Known Allergies  	  MEDICATIONS  tamsulosin 0.4 milliGRAM(s) Oral at bedtime  ALBUTerol    90 MICROgram(s) HFA Inhaler 2 Puff(s) Inhalation every 6 hours PRN  acetaminophen   Tablet .. 650 milliGRAM(s) Oral every 6 hours PRN  fentaNYL   Patch  75 MICROgram(s)/Hr 1 Patch Transdermal every 72 hours  gabapentin 800 milliGRAM(s) Oral Once  HYDROmorphone   Tablet 2 milliGRAM(s) Oral Once  oxyCODONE    IR 2.5 milliGRAM(s) Oral every 4 hours PRN  oxyCODONE    IR 5 milliGRAM(s) Oral every 4 hours PRN  docusate sodium 100 milliGRAM(s) Oral three times a day  senna 2 Tablet(s) Oral at bedtime  dextrose 40% Gel 15 Gram(s) Oral once PRN  dextrose 50% Injectable 12.5 Gram(s) IV Push once  dextrose 50% Injectable 25 Gram(s) IV Push once  dextrose 50% Injectable 25 Gram(s) IV Push once  glucagon  Injectable 1 milliGRAM(s) IntraMuscular once PRN  insulin lispro (HumaLOG) corrective regimen sliding scale   SubCutaneous every 6 hours  levothyroxine 112 MICROGram(s) Oral daily  simvastatin 20 milliGRAM(s) Oral at bedtime  dextrose 5%. 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  Hypothyroid  Diabetes: type 2  Lung nodule: right 2016   repeat ct scan 2017 increased  size  Mets to right humerus bone  Cancer of vocal cord: 1996 biopsy  S/P cholecystectomy  S/P knee replacement: bilateral, 2011    FAMILY HISTORY:  Family history of esophageal cancer  Family history of liver disease    SOCIAL HISTORY  Marital Status:   Occupation: retired   Lives with: wife     SUBSTANCE USE  Tobacco Usage:  ( ) never smoked   (x ) former smoker  ( ) current smoker; Packs per day: 2 ppd x 30 days   Alcohol Usage: (x ) none  ( ) occasional ( ) 2-3 times a week ( ) daily; Last drink:   Recreational drugs (x ) None    REVIEW OF SYSTEMS:  CONSTITUTIONAL: No fevers, No chills, No fatigue, No weight gain  EYES: No vision changes   ENT: No congestion, No ear pain, No sore throat.  NECK: No pain, No stiffness  RESPIRATORY: No shortness of breath, No cough, No wheezing, No hemoptysis  CARDIOVASCULAR: No chest pain. No palpitations, No BLAKELY, No orthopnea, No paroxysmal nocturnal dyspnea, No pleuritic pain  GASTROINTESTINAL: No abdominal pain, No nausea, No vomiting, No hematemesis, No diarrhea No constipation. No melena  GENITOURINARY: No dysuria, No frequency, No incontinence, No hematuria  NEUROLOGICAL: No dizziness, No lightheadedness, No syncope, No LOC, No headache, No numbness or weakness  EXTREMITIES: No Edema, No joint pain, No joint swelling.  PSYCHIATRIC: No anxiety, No depression  SKIN: No diaphoresis. No itching, No rashes, No pressure ulcers  ENDOCRINE: No Diabetes, No hypo/hyperthyroidism, No hyperparathyroid  HEME: No cancer, No anemia    All other review of systems is negative unless indicated above.  VITAL SIGNS  T(C): 36.9 (07-01-19 @ 18:24), Max: 36.9 (07-01-19 @ 18:24)  HR: 62 (07-01-19 @ 19:17) (62 - 77)  BP: 123/49 (07-01-19 @ 19:17) (123/49 - 164/71)  RR: 18 (07-01-19 @ 19:17) (18 - 18)  SpO2: 94% (07-01-19 @ 19:17) (94% - 98%)  Wt(kg): --    PHYSICAL EXAM:  Appearance: NAD, no distress  HEENT:   Normal oral mucosa, PERRL, EOMI  Cardiovascular: Regular rate and rhythm, Normal S1 S2, No JVD, No murmurs  Respiratory: Lungs clear to auscultation. No rales, No rhonchi, No wheezing. No tenderness to palpation  Gastrointestinal:  Soft, Non-tender, + BS  Neurologic: Non-focal, A&Ox3  Skin: Warm and dry, No rashes, No ecchymosis, No cyanosis  Extremities: No clubbing, No cyanosis, No edema  Vascular: Peripheral pulses palpable 2+ bilaterally  Psychiatry: Mood & affect appropriate    LABORATORY VALUES	 	                    9.4    10.25 )-----------( 310      ( 01 Jul 2019 17:52 )             29.8   07-01    128<L>  |  91<L>  |  35<H>  ----------------------------<  194<H>  4.6   |  23  |  1.42<H>    Ca    9.1      01 Jul 2019 17:52  Mg     1.9     07-01  Prothrombin Time, Plasma: 15.1 SEC (07-01 @ 17:52)    CARDIAC MARKERS:  Hemoglobin A1C, Whole Blood: 6.6 % (06-26 @ 10:05)  POCT Blood Glucose.: 185 mg/dL (01 Jul 2019 13:59)    TELEMETRY: 	    ECG:  	  RADIOLOGY: < from: Xray Chest 1 View- PORTABLE-Urgent (07.01.19 @ 19:11) > INTERPRETATION:  Pulmonary edema with small bilateral pleural effusions.  < from: NM PET/CT Onc FDG Skull to Thigh, Subsq (06.17.19 @ 14:08) > IMPRESSION: Since FDG-PET/CT scan 3/14/2019: Slightly decreased FDG activity in proximal right humeral diaphyseal  metastasis and unchanged size of focal anterior cortical disruption. Decreased size and FDG avidity of right paratracheal/hilar lymph nodes. Unchanged subcentimeter spiculated right upper lobe opacity, too small  for PET characterization, malignancy cannot be excluded.  < from: CT Chest w/o Cont (04.09.19 @ 12:52) > IMPRESSION: 8 mm right upper lobe irregular nodular opacity unchanged  since March 14, 2019 may represent a primary lung neoplasm given its  appearance. Previously described right upper lobe peripheral opacity is unchanged  since March 14, 2019.  < from: Xray Chest 2 Views PA/Lat (04.04.19 @ 14:01) > Patchy opacities in the right upper, right lower, and left lower lung  concerning for multifocal pneumonia. Question small right pleural effusion.  OTHER: 	    PREVIOUS DIAGNOSTIC TESTING:    [ ] Echocardiogram: None  [ ] Catheterization: None   [ ] Stress Test:  None     ASSESSMENT AND PLAN  79 yo male with PMH hypertension, hyperlipidemia, hypothyroidism, COPD/ Empysema, CKD, DM2, vocal cord CA s/p RT 1996, lung cancer s/p chemo and RT, lung cancer metastasized to the right humerus confirmed by PET/CT presented for hyponatremia correction prior to exploration of localized resection internal fixation right humerus on 7/2/19 Cardiology consulted for pulmonary edema and pleural effusions in CXR.     PLAN  Patient denies any cardiac history. Currently asymptomatic and denies any cardiac complaints.   Denies SOB, orthopnea, states his ambulation ability declined since diagnosis and takes 1 pill Lasix a day for leg swelling with not much improvement.   Patient will need ECHO in am to r/o any cardiac failure.   Please sent BNP and Troponin.   House cardiology will follow.   Continuous cardiac monitoring to r/o arrhythmias.   Diuresis: C/w home Lasix 20 mg PO daily  Trend BMP 2/2 diuresis and replete as needed  Daily weight monitoring, Strict I/O. Agree with fluid restriction   Check ECHO to evaluate LV/RV function and valvular abnormalities    # [id number] HISTORY OF PRESENT ILLNESS:  Patient is a 78y old  Male who presents with a chief complaint of Hyponatremia (01 Jul 2019 19:32)    HPI: 77 yo male with PMH hypertension, hyperlipidemia, hypothyroidism, COPD/Empysema, CKD, DM2, vocal cord CA s/p RT 1996, lung cancer s/p chemo and RT, lung cancer metastasized to the right humerus confirmed by PET/CT.  Pt is scheduled for exploration localized resection internal fixation right humerus on 7/2/19 with Dr. Richard. However, patient was found to be hyponatremic at presurgical testing June 26th at 124, now 128. Admitted for correction of hyponatremia prior to surgery. In ER, CXR showed mild pulmonary edema with pleural effusions. Patient and family denies cardiac history. Patient was started on Lasix for last 3-4 months of LE edema. Cardiology consulted for pulmonary edema. Patient denies any cardiac history. Currently asymptomatic and denies any cardiac complaints.     Allergies  No Known Allergies  	  MEDICATIONS  tamsulosin 0.4 milliGRAM(s) Oral at bedtime  ALBUTerol    90 MICROgram(s) HFA Inhaler 2 Puff(s) Inhalation every 6 hours PRN  acetaminophen   Tablet .. 650 milliGRAM(s) Oral every 6 hours PRN  fentaNYL   Patch  75 MICROgram(s)/Hr 1 Patch Transdermal every 72 hours  gabapentin 800 milliGRAM(s) Oral Once  HYDROmorphone   Tablet 2 milliGRAM(s) Oral Once  oxyCODONE    IR 2.5 milliGRAM(s) Oral every 4 hours PRN  oxyCODONE    IR 5 milliGRAM(s) Oral every 4 hours PRN  docusate sodium 100 milliGRAM(s) Oral three times a day  senna 2 Tablet(s) Oral at bedtime  dextrose 40% Gel 15 Gram(s) Oral once PRN  dextrose 50% Injectable 12.5 Gram(s) IV Push once  dextrose 50% Injectable 25 Gram(s) IV Push once  dextrose 50% Injectable 25 Gram(s) IV Push once  glucagon  Injectable 1 milliGRAM(s) IntraMuscular once PRN  insulin lispro (HumaLOG) corrective regimen sliding scale   SubCutaneous every 6 hours  levothyroxine 112 MICROGram(s) Oral daily  simvastatin 20 milliGRAM(s) Oral at bedtime  dextrose 5%. 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  Hypothyroid  Diabetes: type 2  Lung nodule: right 2016   repeat ct scan 2017 increased  size  Mets to right humerus bone  Cancer of vocal cord: 1996 biopsy  S/P cholecystectomy  S/P knee replacement: bilateral, 2011    FAMILY HISTORY:  Family history of esophageal cancer  Family history of liver disease    SOCIAL HISTORY  Marital Status:   Occupation: retired   Lives with: wife     SUBSTANCE USE  Tobacco Usage:  ( ) never smoked   (x ) former smoker  ( ) current smoker; Packs per day: 2 ppd x 30 days   Alcohol Usage: (x ) none  ( ) occasional ( ) 2-3 times a week ( ) daily; Last drink:   Recreational drugs (x ) None    REVIEW OF SYSTEMS:  CONSTITUTIONAL: No fevers, No chills, No fatigue, No weight gain  EYES: No vision changes   ENT: No congestion, No ear pain, No sore throat.  NECK: No pain, No stiffness  RESPIRATORY: No shortness of breath, No cough, No wheezing, No hemoptysis  CARDIOVASCULAR: No chest pain. No palpitations, No BLAKELY, No orthopnea, No paroxysmal nocturnal dyspnea, No pleuritic pain  GASTROINTESTINAL: No abdominal pain, No nausea, No vomiting, No hematemesis, No diarrhea No constipation. No melena  GENITOURINARY: No dysuria, No frequency, No incontinence, No hematuria  NEUROLOGICAL: No dizziness, No lightheadedness, No syncope, No LOC, No headache, No numbness or weakness  EXTREMITIES: No Edema, No joint pain, No joint swelling.  PSYCHIATRIC: No anxiety, No depression  SKIN: No diaphoresis. No itching, No rashes, No pressure ulcers  ENDOCRINE: No Diabetes, No hypo/hyperthyroidism, No hyperparathyroid  HEME: No cancer, No anemia    All other review of systems is negative unless indicated above.  VITAL SIGNS  T(C): 36.9 (07-01-19 @ 18:24), Max: 36.9 (07-01-19 @ 18:24)  HR: 62 (07-01-19 @ 19:17) (62 - 77)  BP: 123/49 (07-01-19 @ 19:17) (123/49 - 164/71)  RR: 18 (07-01-19 @ 19:17) (18 - 18)  SpO2: 94% (07-01-19 @ 19:17) (94% - 98%)  Wt(kg): --    PHYSICAL EXAM:  Appearance: NAD, no distress  HEENT:   Normal oral mucosa, PERRL, EOMI  Cardiovascular: Regular rate and rhythm, Normal S1 S2, No JVD, No murmurs  Respiratory: Lungs clear to auscultation. No rales, No rhonchi, No wheezing. No tenderness to palpation  Gastrointestinal:  Soft, Non-tender, + BS  Neurologic: Non-focal, A&Ox3  Skin: Warm and dry, No rashes, No ecchymosis, No cyanosis  Extremities: No clubbing, No cyanosis, No edema  Vascular: Peripheral pulses palpable 2+ bilaterally  Psychiatry: Mood & affect appropriate    LABORATORY VALUES	 	                    9.4    10.25 )-----------( 310      ( 01 Jul 2019 17:52 )             29.8   07-01    128<L>  |  91<L>  |  35<H>  ----------------------------<  194<H>  4.6   |  23  |  1.42<H>    Ca    9.1      01 Jul 2019 17:52  Mg     1.9     07-01  Prothrombin Time, Plasma: 15.1 SEC (07-01 @ 17:52)    CARDIAC MARKERS:  Hemoglobin A1C, Whole Blood: 6.6 % (06-26 @ 10:05)  POCT Blood Glucose.: 185 mg/dL (01 Jul 2019 13:59)    TELEMETRY: 	    ECG:  	  RADIOLOGY: < from: Xray Chest 1 View- PORTABLE-Urgent (07.01.19 @ 19:11) > INTERPRETATION:  Pulmonary edema with small bilateral pleural effusions.  < from: NM PET/CT Onc FDG Skull to Thigh, Subsq (06.17.19 @ 14:08) > IMPRESSION: Since FDG-PET/CT scan 3/14/2019: Slightly decreased FDG activity in proximal right humeral diaphyseal  metastasis and unchanged size of focal anterior cortical disruption. Decreased size and FDG avidity of right paratracheal/hilar lymph nodes. Unchanged subcentimeter spiculated right upper lobe opacity, too small  for PET characterization, malignancy cannot be excluded.  < from: CT Chest w/o Cont (04.09.19 @ 12:52) > IMPRESSION: 8 mm right upper lobe irregular nodular opacity unchanged  since March 14, 2019 may represent a primary lung neoplasm given its  appearance. Previously described right upper lobe peripheral opacity is unchanged  since March 14, 2019.  < from: Xray Chest 2 Views PA/Lat (04.04.19 @ 14:01) > Patchy opacities in the right upper, right lower, and left lower lung  concerning for multifocal pneumonia. Question small right pleural effusion.  OTHER: 	    PREVIOUS DIAGNOSTIC TESTING:    [ ] Echocardiogram: None  [ ] Catheterization: None   [ ] Stress Test:  None     ASSESSMENT AND PLAN  77 yo male with PMH hypertension, hyperlipidemia, hypothyroidism, COPD/ Empysema, CKD, DM2, vocal cord CA s/p RT 1996, lung cancer s/p chemo and RT, lung cancer metastasized to the right humerus confirmed by PET/CT presented for hyponatremia correction prior to exploration of localized resection internal fixation right humerus on 7/2/19 Cardiology consulted for pulmonary edema and pleural effusions in CXR.     PLAN  Patient denies any cardiac history. Currently asymptomatic and denies any cardiac complaints.   Denies SOB, orthopnea, states his ambulation ability declined since diagnosis and takes 1 pill Lasix a day for leg swelling with not much improvement.   Patient will need ECHO in am to r/o any cardiac failure.   Please sent BNP and Troponin.   House cardiology will follow.   Continuous cardiac monitoring to r/o arrhythmias.   Diuresis: C/w home Lasix 20 mg PO daily. Give a dose now.   Trend BMP 2/2 diuresis and replete as needed  Daily weight monitoring, Strict I/O. Agree with fluid restriction   Check ECHO to evaluate LV/RV function and valvular abnormalities    # [id number] HISTORY OF PRESENT ILLNESS:  Patient is a 78y old  Male who presents with a chief complaint of Hyponatremia (01 Jul 2019 19:32)    HPI: 79 yo male with PMH hypertension, hyperlipidemia, hypothyroidism, COPD/Empysema, CKD, DM2, vocal cord CA s/p RT 1996, lung cancer s/p chemo and RT, lung cancer metastasized to the right humerus confirmed by PET/CT.  Pt is scheduled for exploration localized resection internal fixation right humerus on 7/2/19 with Dr. Richard. However, patient was found to be hyponatremic at presurgical testing June 26th at 124, now 128. Admitted for correction of hyponatremia prior to surgery. In ER, CXR showed mild pulmonary edema with pleural effusions. Patient and family denies cardiac history. Patient was started on Lasix for last 3-4 months of LE edema. Cardiology consulted for pulmonary edema. Patient denies any cardiac history. Currently asymptomatic and denies any cardiac complaints.     Allergies  No Known Allergies  	  MEDICATIONS  tamsulosin 0.4 milliGRAM(s) Oral at bedtime  ALBUTerol    90 MICROgram(s) HFA Inhaler 2 Puff(s) Inhalation every 6 hours PRN  acetaminophen   Tablet .. 650 milliGRAM(s) Oral every 6 hours PRN  fentaNYL   Patch  75 MICROgram(s)/Hr 1 Patch Transdermal every 72 hours  gabapentin 800 milliGRAM(s) Oral Once  HYDROmorphone   Tablet 2 milliGRAM(s) Oral Once  oxyCODONE    IR 2.5 milliGRAM(s) Oral every 4 hours PRN  oxyCODONE    IR 5 milliGRAM(s) Oral every 4 hours PRN  docusate sodium 100 milliGRAM(s) Oral three times a day  senna 2 Tablet(s) Oral at bedtime  dextrose 40% Gel 15 Gram(s) Oral once PRN  dextrose 50% Injectable 12.5 Gram(s) IV Push once  dextrose 50% Injectable 25 Gram(s) IV Push once  dextrose 50% Injectable 25 Gram(s) IV Push once  glucagon  Injectable 1 milliGRAM(s) IntraMuscular once PRN  insulin lispro (HumaLOG) corrective regimen sliding scale   SubCutaneous every 6 hours  levothyroxine 112 MICROGram(s) Oral daily  simvastatin 20 milliGRAM(s) Oral at bedtime  dextrose 5%. 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  Hypothyroid  Diabetes: type 2  Lung nodule: right 2016   repeat ct scan 2017 increased  size  Mets to right humerus bone  Cancer of vocal cord: 1996 biopsy  S/P cholecystectomy  S/P knee replacement: bilateral, 2011    FAMILY HISTORY:  Family history of esophageal cancer  Family history of liver disease    SOCIAL HISTORY  Marital Status:   Occupation: retired   Lives with: wife     SUBSTANCE USE  Tobacco Usage:  ( ) never smoked   (x ) former smoker  ( ) current smoker; Packs per day: 2 ppd x 30 days   Alcohol Usage: (x ) none  ( ) occasional ( ) 2-3 times a week ( ) daily; Last drink:   Recreational drugs (x ) None    REVIEW OF SYSTEMS:  CONSTITUTIONAL: No fevers, No chills, No fatigue, No weight gain  EYES: No vision changes   ENT: No congestion, No ear pain, No sore throat.  NECK: No pain, No stiffness  RESPIRATORY: No shortness of breath, No cough, No wheezing, No hemoptysis  CARDIOVASCULAR: No chest pain. No palpitations, No BLAKELY, No orthopnea, No paroxysmal nocturnal dyspnea, No pleuritic pain  GASTROINTESTINAL: No abdominal pain, No nausea, No vomiting, No hematemesis, No diarrhea No constipation. No melena  GENITOURINARY: No dysuria, No frequency, No incontinence, No hematuria  NEUROLOGICAL: No dizziness, No lightheadedness, No syncope, No LOC, No headache, No numbness or weakness  EXTREMITIES: + Edema, + right arm pain,  joint pain, No joint swelling.  PSYCHIATRIC: No anxiety, No depression  SKIN: No diaphoresis. No itching, No rashes, No pressure ulcers  ENDOCRINE: No Diabetes, No hypo/hyperthyroidism, No hyperparathyroid  HEME: No cancer, No anemia    All other review of systems is negative unless indicated above.  VITAL SIGNS  T(C): 36.9 (07-01-19 @ 18:24), Max: 36.9 (07-01-19 @ 18:24)  HR: 62 (07-01-19 @ 19:17) (62 - 77)  BP: 123/49 (07-01-19 @ 19:17) (123/49 - 164/71)  RR: 18 (07-01-19 @ 19:17) (18 - 18)  SpO2: 94% (07-01-19 @ 19:17) (94% - 98%)  Wt(kg): --    PHYSICAL EXAM:  Appearance: NAD, no distress  HEENT:   Normal oral mucosa, PERRL, EOMI  Cardiovascular: Regular rate and rhythm, Normal S1 S2, No JVD, No murmurs  Respiratory: Lungs clear to auscultation. Diminished with bases. No rales, No rhonchi, No wheezing. No tenderness to palpation  Gastrointestinal:  Soft, Non-tender, + BS  Neurologic: Non-focal, A&Ox3  Skin: Warm and dry, No rashes, No ecchymosis, No cyanosis  Extremities: No clubbing, No cyanosis, BLE +2  edema  Vascular: Peripheral pulses palpable 2+ bilaterally  Psychiatry: Mood & affect appropriate    LABORATORY VALUES	 	                    9.4    10.25 )-----------( 310      ( 01 Jul 2019 17:52 )             29.8   07-01    128<L>  |  91<L>  |  35<H>  ----------------------------<  194<H>  4.6   |  23  |  1.42<H>    Ca    9.1      01 Jul 2019 17:52  Mg     1.9     07-01  Prothrombin Time, Plasma: 15.1 SEC (07-01 @ 17:52)    CARDIAC MARKERS:  Hemoglobin A1C, Whole Blood: 6.6 % (06-26 @ 10:05)  POCT Blood Glucose.: 185 mg/dL (01 Jul 2019 13:59)    TELEMETRY: 	    ECG:  	  RADIOLOGY: < from: Xray Chest 1 View- PORTABLE-Urgent (07.01.19 @ 19:11) > INTERPRETATION:  Pulmonary edema with small bilateral pleural effusions.  < from: NM PET/CT Onc FDG Skull to Thigh, Subsq (06.17.19 @ 14:08) > IMPRESSION: Since FDG-PET/CT scan 3/14/2019: Slightly decreased FDG activity in proximal right humeral diaphyseal  metastasis and unchanged size of focal anterior cortical disruption. Decreased size and FDG avidity of right paratracheal/hilar lymph nodes. Unchanged subcentimeter spiculated right upper lobe opacity, too small  for PET characterization, malignancy cannot be excluded.  < from: CT Chest w/o Cont (04.09.19 @ 12:52) > IMPRESSION: 8 mm right upper lobe irregular nodular opacity unchanged  since March 14, 2019 may represent a primary lung neoplasm given its  appearance. Previously described right upper lobe peripheral opacity is unchanged  since March 14, 2019.  < from: Xray Chest 2 Views PA/Lat (04.04.19 @ 14:01) > Patchy opacities in the right upper, right lower, and left lower lung  concerning for multifocal pneumonia. Question small right pleural effusion.  OTHER: 	    PREVIOUS DIAGNOSTIC TESTING:    [ ] Echocardiogram: None  [ ] Catheterization: None   [ ] Stress Test:  None     ASSESSMENT AND PLAN  79 yo male with PMH hypertension, hyperlipidemia, hypothyroidism, COPD/ Empysema, CKD, DM2, vocal cord CA s/p RT 1996, lung cancer s/p chemo and RT, lung cancer metastasized to the right humerus confirmed by PET/CT presented for hyponatremia correction prior to exploration of localized resection internal fixation right humerus on 7/2/19 Cardiology consulted for pulmonary edema and pleural effusions in CXR.     PLAN  Patient denies any cardiac history. Currently asymptomatic and denies any cardiac complaints.   Denies SOB, orthopnea, states his ambulation ability declined since diagnosis and takes 1 pill Lasix a day for leg swelling with not much improvement.   Patient will need ECHO in am to r/o any cardiac failure.   Please sent BNP and Troponin.   House cardiology will follow.   Continuous cardiac monitoring to r/o arrhythmias.   Diuresis: C/w home Lasix 20 mg PO daily. Give a dose now. Patient takes dose at night  Trend BMP 2/2 diuresis and replete as needed  Daily weight monitoring, Strict I/O. Agree with fluid restriction   Check ECHO to evaluate LV/RV function and valvular abnormalities    # [id number]

## 2019-07-01 NOTE — HISTORY OF PRESENT ILLNESS
[No Pertinent Cardiac History] : no history of aortic stenosis, atrial fibrillation, coronary artery disease, recent myocardial infarction, or implantable device/pacemaker [COPD] : COPD [Sleep Apnea] : sleep apnea [No Adverse Anesthesia Reaction] : no adverse anesthesia reaction in self or family member [Chronic Kidney Disease] : chronic kidney disease [Diabetes] : diabetes [(Patient denies any chest pain, claudication, dyspnea on exertion, orthopnea, palpitations or syncope)] : Patient denies any chest pain, claudication, dyspnea on exertion, orthopnea, palpitations or syncope [Poor (<4 METs)] : Poor (<4 METs) [Aortic Stenosis] : no aortic stenosis [Atrial Fibrillation] : no atrial fibrillation [Coronary Artery Disease] : no coronary artery disease [Recent Myocardial Infarction] : no recent myocardial infarction [Implantable Device/Pacemaker] : no implantable device/pacemaker [Smoker] : not a smoker [Chronic Anticoagulation] : no chronic anticoagulation [FreeTextEntry1] : Exploration right humerus lesion/lung ca bone mets [FreeTextEntry2] : July 2, 2019 [FreeTextEntry3] : Dr. Richard [FreeTextEntry4] : Patient with history of Copd/Emphysema, lung cancer with mets, diabetes type 2, chronic kidney disease who will undergo right arm humeral lesion exploration secondary to bone mets related pain.

## 2019-07-01 NOTE — ED ADULT TRIAGE NOTE - CHIEF COMPLAINT QUOTE
Pt sent by orthopedic oncologist for low sodium levels found during pre-op testing, pt scheduled for cancer removal surgery tomorrow. PMH lung with mets to the bone -not presently receiving chemo or radiation.

## 2019-07-01 NOTE — ED PROVIDER NOTE - PHYSICAL EXAMINATION
*Gen: NAD, AAO*3  *HEENT: NC/AT, MMM, airway patent, trachea midline  *CV: RRR, S1/S2 present, no murmurs/rubs  *Resp: no respiratory distress, LCTAB, no wheezing/rales  *Abd: non-distended, soft N/Tx4, no guarding or rigidity  *Neuro: no focal neuro deficits, moving all limbs appropriately  *Extremities: no gross deformity, distal pulses intact, right upper arm TTP  *Skin: no rashes, no wounds   ~ Mat iFnn M.D.

## 2019-07-01 NOTE — REVIEW OF SYSTEMS
[Fatigue] : fatigue [Nausea] : nausea [Joint Pain] : joint pain [Joint Stiffness] : joint stiffness [Joint Swelling] : joint swelling [Confusion] : confusion [Unsteady Walk] : ataxia [Negative] : Heme/Lymph [Fever] : no fever [Chills] : no chills [Hot Flashes] : no hot flashes [Recent Change In Weight] : ~T no recent weight change [Muscle Weakness] : no muscle weakness

## 2019-07-01 NOTE — HISTORY OF PRESENT ILLNESS
[FreeTextEntry1] : Scheduled Procedure: Exploration right humerus lesion/lung ca bone mets  Date of Procedure: July 2, 2019  Surgeon: Dr. Richard  Patient with history of Copd/Emphysema, lung cancer with mets, diabetes type 2, chronic kidney disease who will undergo right arm humeral lesion exploration secondary to bone mets related pain.  Cardiac: no history of aortic stenosis, atrial fibrillation, coronary artery disease, recent myocardial infarction, or implantable device/pacemaker, but no aortic stenosis, no atrial fibrillation, no coronary artery disease, no recent myocardial infarction and no implantable device/pacemaker.  Pulmonary: COPD and sleep apnea, but not a smoker.  Adverse Anesthesia Reaction: no adverse anesthesia reaction in self or family member.  Other: chronic kidney disease and diabetes, but no chronic anticoagulation.   Cardiovascular Symptoms: Patient denies any chest pain, claudication, dyspnea on exertion, orthopnea, palpitations or syncope  Functional Capacity: Poor (<4 METs).

## 2019-07-01 NOTE — H&P ADULT - NSICDXPASTMEDICALHX_GEN_ALL_CORE_FT
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

## 2019-07-01 NOTE — PHYSICAL EXAM
[No Acute Distress] : no acute distress [Well Developed] : well developed [Well-Appearing] : well-appearing [Normal Sclera/Conjunctiva] : normal sclera/conjunctiva [PERRL] : pupils equal round and reactive to light [EOMI] : extraocular movements intact [Normal Outer Ear/Nose] : the outer ears and nose were normal in appearance [Normal Oropharynx] : the oropharynx was normal [No JVD] : no jugular venous distention [Supple] : supple [No Lymphadenopathy] : no lymphadenopathy [Thyroid Normal, No Nodules] : the thyroid was normal and there were no nodules present [No Respiratory Distress] : no respiratory distress  [Normal Rate] : normal rate  [Regular Rhythm] : with a regular rhythm [Normal S1, S2] : normal S1 and S2 [No Murmur] : no murmur heard [No Carotid Bruits] : no carotid bruits [No Abdominal Bruit] : a ~M bruit was not heard ~T in the abdomen [No Varicosities] : no varicosities [Pedal Pulses Present] : the pedal pulses are present [No Edema] : there was no peripheral edema [No Extremity Clubbing/Cyanosis] : no extremity clubbing/cyanosis [No Palpable Aorta] : no palpable aorta [Soft] : abdomen soft [Non Tender] : non-tender [Non-distended] : non-distended [No Masses] : no abdominal mass palpated [No HSM] : no HSM [Normal Bowel Sounds] : normal bowel sounds [Normal Posterior Cervical Nodes] : no posterior cervical lymphadenopathy [Normal Anterior Cervical Nodes] : no anterior cervical lymphadenopathy [No CVA Tenderness] : no CVA  tenderness [No Spinal Tenderness] : no spinal tenderness [Grossly Normal Strength/Tone] : grossly normal strength/tone [Normal Gait] : normal gait [Coordination Grossly Intact] : coordination grossly intact [No Focal Deficits] : no focal deficits [Deep Tendon Reflexes (DTR)] : deep tendon reflexes were 2+ and symmetric [Normal Affect] : the affect was normal [Normal Insight/Judgement] : insight and judgment were intact [de-identified] : weak  [de-identified] : alec at bases [de-identified] : right arm tenderness with deformity [de-identified] : edema bilaterally

## 2019-07-01 NOTE — PHYSICAL EXAM
[General Appearance - Alert] : alert [General Appearance - In No Acute Distress] : in no acute distress [Sclera] : the sclera and conjunctiva were normal [PERRL With Normal Accommodation] : pupils were equal in size, round, and reactive to light [Extraocular Movements] : extraocular movements were intact [Outer Ear] : the ears and nose were normal in appearance [Oropharynx] : the oropharynx was normal [Neck Appearance] : the appearance of the neck was normal [Neck Cervical Mass (___cm)] : no neck mass was observed [Jugular Venous Distention Increased] : there was no jugular-venous distention [Thyroid Diffuse Enlargement] : the thyroid was not enlarged [Thyroid Nodule] : there were no palpable thyroid nodules [Auscultation Breath Sounds / Voice Sounds] : lungs were clear to auscultation bilaterally [Heart Rate And Rhythm] : heart rate was normal and rhythm regular [Heart Sounds] : normal S1 and S2 [Heart Sounds Gallop] : no gallops [Murmurs] : no murmurs [Heart Sounds Pericardial Friction Rub] : no pericardial rub [Bowel Sounds] : normal bowel sounds [Abdomen Soft] : soft [Abdomen Tenderness] : non-tender [Abdomen Mass (___ Cm)] : no abdominal mass palpated [Cervical Lymph Nodes Enlarged Posterior Bilaterally] : posterior cervical [Cervical Lymph Nodes Enlarged Anterior Bilaterally] : anterior cervical [Supraclavicular Lymph Nodes Enlarged Bilaterally] : supraclavicular [Axillary Lymph Nodes Enlarged Bilaterally] : axillary [Femoral Lymph Nodes Enlarged Bilaterally] : femoral [Inguinal Lymph Nodes Enlarged Bilaterally] : inguinal [No CVA Tenderness] : no ~M costovertebral angle tenderness [No Spinal Tenderness] : no spinal tenderness [Abnormal Walk] : normal gait [Nail Clubbing] : no clubbing  or cyanosis of the fingernails [Musculoskeletal - Swelling] : no joint swelling seen [Motor Tone] : muscle strength and tone were normal [Skin Color & Pigmentation] : normal skin color and pigmentation [Skin Turgor] : normal skin turgor [] : no rash [Cranial Nerves] : cranial nerves 2-12 were intact [Deep Tendon Reflexes (DTR)] : deep tendon reflexes were 2+ and symmetric [Sensation] : the sensory exam was normal to light touch and pinprick [No Focal Deficits] : no focal deficits [Oriented To Time, Place, And Person] : oriented to person, place, and time [Impaired Insight] : insight and judgment were intact [Affect] : the affect was normal [FreeTextEntry1] : In Pain,

## 2019-07-01 NOTE — CONSULT NOTE ADULT - PROBLEM SELECTOR RECOMMENDATION 2
-given absence of cardiac disease by history, but current signs of biventricular failure (pulm fluid overload, BL le edema), would obtain TTE, cardiology input to properly risk-stratify prior to OR clearance.

## 2019-07-01 NOTE — ED PROVIDER NOTE - CLINICAL SUMMARY MEDICAL DECISION MAKING FREE TEXT BOX
[place]: 78M with PMH of hypertension, hyperlipidemia, hypothyroidism, COPD/Empysema, CKD, DM2, vocal cord CA s/p RT 1996, lung cancer s/p chemo and RT, sent in for Na 124 in the preop eval.  No neuro sx.  On exam, HDS, NAD, MMM, eyes clear, lungs CTAB, heart sounds normal, abd soft, NT, ND, no CVAT, LEs without edema, wwp, skin normal temperature and color, neuro: alert and oriented, no focal deficits.  Will recheck labs and admit.

## 2019-07-01 NOTE — CONSULT NOTE ADULT - PROBLEM SELECTOR RECOMMENDATION 9
- -dd includes siadh vs volume overload.   -would apply relative fluid restriction to 1200 cc/daily for now  -follow repeat bmp, as well as uric acid and tsh add-on  -awaiting urine Na, urine osmolarity  -as intraoperative fluid shifting can be especially pronounced in hyponatremic pt, would await repeat Na levels in am. -dd includes siadh vs volume overload.   -would apply relative fluid restriction to 1200 cc/daily for now  -follow repeat bmp, as well as uric acid and tsh add-on  -awaiting urine Na, urine osmolarity  -as intraoperative fluid shifting can be especially pronounced in hyponatremic pt, would await repeat Na levels in am.  -can continue home lasix regimen

## 2019-07-01 NOTE — ASSESSMENT
[FreeTextEntry1] : Hyponatremia (276.1) (E87.1)\par Adenocarcinoma of lung, right (162.9) (C34.91) - W. Bone Mets, \par CKD (chronic kidney disease), stage III (585.3) (N18.3)\par Emphysema lung (492.8) (J43.9)\par \par DX : \par Hyponatremia likely related to Lung Cancer , Pain & SSRI  ( SIADH )\par \par Rec : Hospitalization to Optimize SNa+, Then to Proceed w. Surgery,\par \par F/U upon Discharge, \par \par

## 2019-07-02 ENCOUNTER — RX RENEWAL (OUTPATIENT)
Age: 79
End: 2019-07-02

## 2019-07-02 ENCOUNTER — APPOINTMENT (OUTPATIENT)
Dept: ORTHOPEDIC SURGERY | Facility: HOSPITAL | Age: 79
End: 2019-07-02

## 2019-07-02 ENCOUNTER — RESULT REVIEW (OUTPATIENT)
Age: 79
End: 2019-07-02

## 2019-07-02 DIAGNOSIS — I10 ESSENTIAL (PRIMARY) HYPERTENSION: ICD-10-CM

## 2019-07-02 DIAGNOSIS — C34.90 MALIGNANT NEOPLASM OF UNSPECIFIED PART OF UNSPECIFIED BRONCHUS OR LUNG: ICD-10-CM

## 2019-07-02 DIAGNOSIS — E11.65 TYPE 2 DIABETES MELLITUS WITH HYPERGLYCEMIA: ICD-10-CM

## 2019-07-02 DIAGNOSIS — E03.9 HYPOTHYROIDISM, UNSPECIFIED: ICD-10-CM

## 2019-07-02 DIAGNOSIS — M89.9 DISORDER OF BONE, UNSPECIFIED: ICD-10-CM

## 2019-07-02 DIAGNOSIS — E78.00 PURE HYPERCHOLESTEROLEMIA, UNSPECIFIED: ICD-10-CM

## 2019-07-02 DIAGNOSIS — R60.0 LOCALIZED EDEMA: ICD-10-CM

## 2019-07-02 DIAGNOSIS — Z29.9 ENCOUNTER FOR PROPHYLACTIC MEASURES, UNSPECIFIED: ICD-10-CM

## 2019-07-02 LAB
ANION GAP SERPL CALC-SCNC: 12 MMO/L — SIGNIFICANT CHANGE UP (ref 7–14)
APPEARANCE UR: CLEAR — SIGNIFICANT CHANGE UP
BILIRUB UR-MCNC: NEGATIVE — SIGNIFICANT CHANGE UP
BLOOD UR QL VISUAL: NEGATIVE — SIGNIFICANT CHANGE UP
BUN SERPL-MCNC: 31 MG/DL — HIGH (ref 7–23)
CALCIUM SERPL-MCNC: 9.4 MG/DL — SIGNIFICANT CHANGE UP (ref 8.4–10.5)
CHLORIDE SERPL-SCNC: 93 MMOL/L — LOW (ref 98–107)
CO2 SERPL-SCNC: 24 MMOL/L — SIGNIFICANT CHANGE UP (ref 22–31)
COLOR SPEC: SIGNIFICANT CHANGE UP
CREAT SERPL-MCNC: 1.36 MG/DL — HIGH (ref 0.5–1.3)
GLUCOSE BLDC GLUCOMTR-MCNC: 175 MG/DL — HIGH (ref 70–99)
GLUCOSE BLDC GLUCOMTR-MCNC: 199 MG/DL — HIGH (ref 70–99)
GLUCOSE BLDC GLUCOMTR-MCNC: 240 MG/DL — HIGH (ref 70–99)
GLUCOSE SERPL-MCNC: 255 MG/DL — HIGH (ref 70–99)
GLUCOSE UR-MCNC: NEGATIVE — SIGNIFICANT CHANGE UP
KETONES UR-MCNC: NEGATIVE — SIGNIFICANT CHANGE UP
LEUKOCYTE ESTERASE UR-ACNC: NEGATIVE — SIGNIFICANT CHANGE UP
MAGNESIUM SERPL-MCNC: 1.9 MG/DL — SIGNIFICANT CHANGE UP (ref 1.6–2.6)
NITRITE UR-MCNC: NEGATIVE — SIGNIFICANT CHANGE UP
OSMOLALITY SERPL: 283 MOSMO/KG — SIGNIFICANT CHANGE UP (ref 275–295)
PH UR: 5.5 — SIGNIFICANT CHANGE UP (ref 5–8)
POTASSIUM SERPL-MCNC: 4.7 MMOL/L — SIGNIFICANT CHANGE UP (ref 3.5–5.3)
POTASSIUM SERPL-SCNC: 4.7 MMOL/L — SIGNIFICANT CHANGE UP (ref 3.5–5.3)
PROT UR-MCNC: NEGATIVE — SIGNIFICANT CHANGE UP
RBC CASTS # UR COMP ASSIST: SIGNIFICANT CHANGE UP (ref 0–?)
SODIUM SERPL-SCNC: 129 MMOL/L — LOW (ref 135–145)
SP GR SPEC: 1.01 — SIGNIFICANT CHANGE UP (ref 1–1.04)
TROPONIN T, HIGH SENSITIVITY: 58 NG/L — CRITICAL HIGH (ref ?–14)
UROBILINOGEN FLD QL: NORMAL — SIGNIFICANT CHANGE UP
WBC UR QL: SIGNIFICANT CHANGE UP (ref 0–?)

## 2019-07-02 PROCEDURE — 23220 RADICAL RESCJ TUMOR PROX HUM: CPT | Mod: RT

## 2019-07-02 PROCEDURE — 99222 1ST HOSP IP/OBS MODERATE 55: CPT

## 2019-07-02 PROCEDURE — 99233 SBSQ HOSP IP/OBS HIGH 50: CPT

## 2019-07-02 PROCEDURE — 93306 TTE W/DOPPLER COMPLETE: CPT | Mod: 26

## 2019-07-02 PROCEDURE — 23615 OPTX PROX HUMRL FX W/INT FIX: CPT | Mod: RT

## 2019-07-02 RX ORDER — INSULIN GLARGINE 100 [IU]/ML
25 INJECTION, SOLUTION SUBCUTANEOUS AT BEDTIME
Refills: 0 | Status: DISCONTINUED | OUTPATIENT
Start: 2019-07-02 | End: 2019-07-03

## 2019-07-02 RX ORDER — FUROSEMIDE 40 MG
20 TABLET ORAL DAILY
Refills: 0 | Status: DISCONTINUED | OUTPATIENT
Start: 2019-07-02 | End: 2019-07-03

## 2019-07-02 RX ORDER — FENTANYL 75 UG/H
75 PATCH, EXTENDED RELEASE TRANSDERMAL
Qty: 10 | Refills: 0 | Status: ACTIVE | COMMUNITY
Start: 2019-04-25 | End: 1900-01-01

## 2019-07-02 RX ORDER — INSULIN LISPRO 100/ML
5 VIAL (ML) SUBCUTANEOUS
Refills: 0 | Status: DISCONTINUED | OUTPATIENT
Start: 2019-07-02 | End: 2019-07-03

## 2019-07-02 RX ORDER — FENTANYL CITRATE 50 UG/ML
1 INJECTION INTRAVENOUS
Refills: 0 | Status: DISCONTINUED | OUTPATIENT
Start: 2019-07-02 | End: 2019-07-03

## 2019-07-02 RX ADMIN — INSULIN GLARGINE 25 UNIT(S): 100 INJECTION, SOLUTION SUBCUTANEOUS at 22:00

## 2019-07-02 RX ADMIN — TAMSULOSIN HYDROCHLORIDE 0.4 MILLIGRAM(S): 0.4 CAPSULE ORAL at 21:47

## 2019-07-02 RX ADMIN — OXYCODONE HYDROCHLORIDE 2.5 MILLIGRAM(S): 5 TABLET ORAL at 15:00

## 2019-07-02 RX ADMIN — Medication 100 MILLIGRAM(S): at 21:47

## 2019-07-02 RX ADMIN — Medication 100 MILLIGRAM(S): at 14:31

## 2019-07-02 RX ADMIN — FENTANYL CITRATE 1 PATCH: 50 INJECTION INTRAVENOUS at 08:47

## 2019-07-02 RX ADMIN — Medication 1: at 17:56

## 2019-07-02 RX ADMIN — Medication 20 MILLIGRAM(S): at 05:11

## 2019-07-02 RX ADMIN — Medication 2: at 06:08

## 2019-07-02 RX ADMIN — FENTANYL CITRATE 1 PATCH: 50 INJECTION INTRAVENOUS at 18:09

## 2019-07-02 RX ADMIN — OXYCODONE HYDROCHLORIDE 2.5 MILLIGRAM(S): 5 TABLET ORAL at 14:30

## 2019-07-02 RX ADMIN — SIMVASTATIN 20 MILLIGRAM(S): 20 TABLET, FILM COATED ORAL at 21:47

## 2019-07-02 RX ADMIN — GABAPENTIN 800 MILLIGRAM(S): 400 CAPSULE ORAL at 14:31

## 2019-07-02 RX ADMIN — Medication 3: at 12:42

## 2019-07-02 RX ADMIN — SENNA PLUS 2 TABLET(S): 8.6 TABLET ORAL at 21:48

## 2019-07-02 RX ADMIN — Medication 112 MICROGRAM(S): at 05:11

## 2019-07-02 RX ADMIN — OXYCODONE HYDROCHLORIDE 2.5 MILLIGRAM(S): 5 TABLET ORAL at 19:00

## 2019-07-02 RX ADMIN — FENTANYL CITRATE 1 PATCH: 50 INJECTION INTRAVENOUS at 03:09

## 2019-07-02 NOTE — PROVIDER CONTACT NOTE (MEDICATION) - SITUATION
PT FENTANYL PATCH WAS NOT REPLACED IN ER, PATCH REPLACE IN 8 TOWER, TEAM AWARE AND PLACED STAT ORDER

## 2019-07-02 NOTE — PROGRESS NOTE ADULT - SUBJECTIVE AND OBJECTIVE BOX
Ortho Surgery Progress Note  KARLI UGARTE    S: No acute events overnight. Pain well controlled with current regimen. Denies N/V and fevers/chills.     O:  Vital Signs Last 24 Hrs  T(C): 36.9 (02 Jul 2019 05:14), Max: 36.9 (01 Jul 2019 18:24)  T(F): 98.5 (02 Jul 2019 05:14), Max: 98.5 (02 Jul 2019 05:14)  HR: 80 (02 Jul 2019 05:14) (62 - 92)  BP: 150/81 (02 Jul 2019 05:14) (123/49 - 164/71)  BP(mean): --  RR: 16 (02 Jul 2019 05:14) (16 - 18)  SpO2: 96% (02 Jul 2019 05:14) (94% - 98%)    07-01-19 @ 07:01  -  07-02-19 @ 06:12  --------------------------------------------------------  IN: 0 mL / OUT: 200 mL / NET: -200 mL        Lab Results 24hrs:                        9.4    10.25 )-----------( 310      ( 01 Jul 2019 17:52 )             29.8     07-01    128<L>  |  91<L>  |  35<H>  ----------------------------<  194<H>  4.6   |  23  |  1.42<H>    Ca    9.1      01 Jul 2019 17:52  Mg     1.9     07-01        Physical Exam:  Gen: NAD  RLE: skin closed, TTP over midshaft humerus  +M/R/U/MSc/Ax  +SILT M/R/U/Msc/Ax  2+ Rad, WWP    A/P: 78y Male w/ pathologic fx of R midshaft humerus, admitted for medical optimization  - Plan for ORIF R humerus today if cleared  - FU AM labs, trend Na  - FU med clearance  - FU cards clearance  - FU Echo  - c/w Lasix  - NWB RUE  - Hold DVT ppx for OR  - NPO

## 2019-07-02 NOTE — PROGRESS NOTE ADULT - PROBLEM SELECTOR PLAN 9
LE edema possibly from venous insufficiency  C/w lasix 20mg daily  Monitor, elevated ext as much as possible

## 2019-07-02 NOTE — PROGRESS NOTE ADULT - PROBLEM SELECTOR PLAN 1
-Left humerus lesion from metastatic lung cancer  -Plan for ORIF of left humerus per ortho today  -Pain management per ortho  -PT/OT -right humerus lesion from metastatic lung cancer  -Plan for ORIF of left humerus per ortho today  -Pain management per ortho  -PT/OT

## 2019-07-02 NOTE — PATIENT PROFILE ADULT - INDICATE TYPE
DNR aND HCP not in chart. Wife states form is misplaced and will sign new form when arriving at hospital.

## 2019-07-02 NOTE — PROVIDER CONTACT NOTE (OTHER) - SITUATION
DNR FORM NOT IN [place], WIFE NOTIFIED, WIFE STATED SHE MISPLACED FORM AND WILL COMPLETE NEW ONE UPON ARRIVAL VIA HOSPITAL

## 2019-07-03 ENCOUNTER — TRANSCRIPTION ENCOUNTER (OUTPATIENT)
Age: 79
End: 2019-07-03

## 2019-07-03 ENCOUNTER — CHART COPY (OUTPATIENT)
Age: 79
End: 2019-07-03

## 2019-07-03 VITALS
OXYGEN SATURATION: 95 % | SYSTOLIC BLOOD PRESSURE: 118 MMHG | HEART RATE: 85 BPM | DIASTOLIC BLOOD PRESSURE: 69 MMHG | TEMPERATURE: 99 F | RESPIRATION RATE: 17 BRPM

## 2019-07-03 DIAGNOSIS — M89.9 DISORDER OF BONE, UNSPECIFIED: ICD-10-CM

## 2019-07-03 LAB
ANION GAP SERPL CALC-SCNC: 15 MMO/L — HIGH (ref 7–14)
BUN SERPL-MCNC: 27 MG/DL — HIGH (ref 7–23)
CALCIUM SERPL-MCNC: 8.4 MG/DL — SIGNIFICANT CHANGE UP (ref 8.4–10.5)
CHLORIDE SERPL-SCNC: 95 MMOL/L — LOW (ref 98–107)
CO2 SERPL-SCNC: 20 MMOL/L — LOW (ref 22–31)
CREAT SERPL-MCNC: 1.22 MG/DL — SIGNIFICANT CHANGE UP (ref 0.5–1.3)
GLUCOSE BLDC GLUCOMTR-MCNC: 218 MG/DL — HIGH (ref 70–99)
GLUCOSE BLDC GLUCOMTR-MCNC: 225 MG/DL — HIGH (ref 70–99)
GLUCOSE SERPL-MCNC: 231 MG/DL — HIGH (ref 70–99)
HCT VFR BLD CALC: 26.4 % — LOW (ref 39–50)
HGB BLD-MCNC: 8.4 G/DL — LOW (ref 13–17)
MCHC RBC-ENTMCNC: 27 PG — SIGNIFICANT CHANGE UP (ref 27–34)
MCHC RBC-ENTMCNC: 31.8 % — LOW (ref 32–36)
MCV RBC AUTO: 84.9 FL — SIGNIFICANT CHANGE UP (ref 80–100)
NRBC # FLD: 0 K/UL — SIGNIFICANT CHANGE UP (ref 0–0)
PLATELET # BLD AUTO: 279 K/UL — SIGNIFICANT CHANGE UP (ref 150–400)
PMV BLD: 8.1 FL — SIGNIFICANT CHANGE UP (ref 7–13)
POTASSIUM SERPL-MCNC: 4.3 MMOL/L — SIGNIFICANT CHANGE UP (ref 3.5–5.3)
POTASSIUM SERPL-SCNC: 4.3 MMOL/L — SIGNIFICANT CHANGE UP (ref 3.5–5.3)
RBC # BLD: 3.11 M/UL — LOW (ref 4.2–5.8)
RBC # FLD: 16.9 % — HIGH (ref 10.3–14.5)
SODIUM SERPL-SCNC: 130 MMOL/L — LOW (ref 135–145)
WBC # BLD: 10.24 K/UL — SIGNIFICANT CHANGE UP (ref 3.8–10.5)
WBC # FLD AUTO: 10.24 K/UL — SIGNIFICANT CHANGE UP (ref 3.8–10.5)

## 2019-07-03 PROCEDURE — 99238 HOSP IP/OBS DSCHRG MGMT 30/<: CPT

## 2019-07-03 PROCEDURE — 99233 SBSQ HOSP IP/OBS HIGH 50: CPT

## 2019-07-03 PROCEDURE — 88341 IMHCHEM/IMCYTCHM EA ADD ANTB: CPT | Mod: 26

## 2019-07-03 PROCEDURE — 88342 IMHCHEM/IMCYTCHM 1ST ANTB: CPT | Mod: 26

## 2019-07-03 PROCEDURE — 88309 TISSUE EXAM BY PATHOLOGIST: CPT | Mod: 26

## 2019-07-03 PROCEDURE — 99232 SBSQ HOSP IP/OBS MODERATE 35: CPT

## 2019-07-03 PROCEDURE — 88311 DECALCIFY TISSUE: CPT | Mod: 26

## 2019-07-03 RX ORDER — DOCUSATE SODIUM 100 MG
1 CAPSULE ORAL
Qty: 0 | Refills: 0 | DISCHARGE

## 2019-07-03 RX ORDER — TAMSULOSIN HYDROCHLORIDE 0.4 MG/1
1 CAPSULE ORAL
Qty: 0 | Refills: 0 | DISCHARGE

## 2019-07-03 RX ORDER — SENNA PLUS 8.6 MG/1
2 TABLET ORAL
Qty: 14 | Refills: 0
Start: 2019-07-03 | End: 2019-07-09

## 2019-07-03 RX ORDER — KETOROLAC TROMETHAMINE 30 MG/ML
30 SYRINGE (ML) INJECTION ONCE
Refills: 0 | Status: DISCONTINUED | OUTPATIENT
Start: 2019-07-03 | End: 2019-07-03

## 2019-07-03 RX ORDER — ONDANSETRON 8 MG/1
4 TABLET, FILM COATED ORAL ONCE
Refills: 0 | Status: DISCONTINUED | OUTPATIENT
Start: 2019-07-03 | End: 2019-07-03

## 2019-07-03 RX ORDER — SODIUM CHLORIDE 9 MG/ML
1000 INJECTION, SOLUTION INTRAVENOUS
Refills: 0 | Status: DISCONTINUED | OUTPATIENT
Start: 2019-07-03 | End: 2019-07-03

## 2019-07-03 RX ORDER — INSULIN LISPRO 100/ML
7 VIAL (ML) SUBCUTANEOUS
Refills: 0 | Status: DISCONTINUED | OUTPATIENT
Start: 2019-07-03 | End: 2019-07-03

## 2019-07-03 RX ORDER — DOCUSATE SODIUM 100 MG
100 CAPSULE ORAL THREE TIMES A DAY
Refills: 0 | Status: DISCONTINUED | OUTPATIENT
Start: 2019-07-03 | End: 2019-07-03

## 2019-07-03 RX ORDER — HYDROMORPHONE HYDROCHLORIDE 2 MG/ML
0.5 INJECTION INTRAMUSCULAR; INTRAVENOUS; SUBCUTANEOUS
Refills: 0 | Status: DISCONTINUED | OUTPATIENT
Start: 2019-07-03 | End: 2019-07-03

## 2019-07-03 RX ORDER — OXYCODONE HYDROCHLORIDE 5 MG/1
1 TABLET ORAL
Qty: 42 | Refills: 0
Start: 2019-07-03 | End: 2019-07-09

## 2019-07-03 RX ORDER — DOCUSATE SODIUM 100 MG
1 CAPSULE ORAL
Qty: 21 | Refills: 0
Start: 2019-07-03 | End: 2019-07-09

## 2019-07-03 RX ORDER — ACETAMINOPHEN 500 MG
3 TABLET ORAL
Qty: 63 | Refills: 0
Start: 2019-07-03 | End: 2019-07-09

## 2019-07-03 RX ORDER — OXYCODONE HYDROCHLORIDE 5 MG/1
5 TABLET ORAL EVERY 4 HOURS
Refills: 0 | Status: DISCONTINUED | OUTPATIENT
Start: 2019-07-03 | End: 2019-07-03

## 2019-07-03 RX ORDER — INSULIN LISPRO 100/ML
VIAL (ML) SUBCUTANEOUS
Refills: 0 | Status: DISCONTINUED | OUTPATIENT
Start: 2019-07-03 | End: 2019-07-03

## 2019-07-03 RX ORDER — ASPIRIN/CALCIUM CARB/MAGNESIUM 324 MG
1 TABLET ORAL
Qty: 84 | Refills: 0
Start: 2019-07-03 | End: 2019-08-13

## 2019-07-03 RX ORDER — INSULIN LISPRO 100/ML
2 VIAL (ML) SUBCUTANEOUS ONCE
Refills: 0 | Status: DISCONTINUED | OUTPATIENT
Start: 2019-07-03 | End: 2019-07-03

## 2019-07-03 RX ORDER — TAMSULOSIN HYDROCHLORIDE 0.4 MG/1
1 CAPSULE ORAL
Qty: 0 | Refills: 0 | DISCHARGE
Start: 2019-07-03

## 2019-07-03 RX ORDER — INSULIN GLARGINE 100 [IU]/ML
30 INJECTION, SOLUTION SUBCUTANEOUS AT BEDTIME
Refills: 0 | Status: DISCONTINUED | OUTPATIENT
Start: 2019-07-03 | End: 2019-07-03

## 2019-07-03 RX ORDER — CEFAZOLIN SODIUM 1 G
2000 VIAL (EA) INJECTION EVERY 8 HOURS
Refills: 0 | Status: DISCONTINUED | OUTPATIENT
Start: 2019-07-03 | End: 2019-07-03

## 2019-07-03 RX ORDER — ASPIRIN/CALCIUM CARB/MAGNESIUM 324 MG
325 TABLET ORAL
Refills: 0 | Status: DISCONTINUED | OUTPATIENT
Start: 2019-07-03 | End: 2019-07-03

## 2019-07-03 RX ORDER — OXYCODONE HYDROCHLORIDE 5 MG/1
10 TABLET ORAL EVERY 4 HOURS
Refills: 0 | Status: DISCONTINUED | OUTPATIENT
Start: 2019-07-03 | End: 2019-07-03

## 2019-07-03 RX ADMIN — Medication 30 MILLIGRAM(S): at 09:47

## 2019-07-03 RX ADMIN — Medication 100 MILLIGRAM(S): at 05:23

## 2019-07-03 RX ADMIN — Medication 20 MILLIGRAM(S): at 05:23

## 2019-07-03 RX ADMIN — Medication 5 UNIT(S): at 08:06

## 2019-07-03 RX ADMIN — Medication 2: at 08:06

## 2019-07-03 RX ADMIN — OXYCODONE HYDROCHLORIDE 5 MILLIGRAM(S): 5 TABLET ORAL at 02:47

## 2019-07-03 RX ADMIN — Medication 2: at 12:10

## 2019-07-03 RX ADMIN — FENTANYL CITRATE 1 PATCH: 50 INJECTION INTRAVENOUS at 06:09

## 2019-07-03 RX ADMIN — HYDROMORPHONE HYDROCHLORIDE 0.5 MILLIGRAM(S): 2 INJECTION INTRAMUSCULAR; INTRAVENOUS; SUBCUTANEOUS at 01:48

## 2019-07-03 RX ADMIN — Medication 100 MILLIGRAM(S): at 07:40

## 2019-07-03 RX ADMIN — Medication 325 MILLIGRAM(S): at 05:24

## 2019-07-03 RX ADMIN — Medication 7 UNIT(S): at 12:10

## 2019-07-03 RX ADMIN — OXYCODONE HYDROCHLORIDE 10 MILLIGRAM(S): 5 TABLET ORAL at 07:40

## 2019-07-03 RX ADMIN — HYDROMORPHONE HYDROCHLORIDE 0.5 MILLIGRAM(S): 2 INJECTION INTRAMUSCULAR; INTRAVENOUS; SUBCUTANEOUS at 02:17

## 2019-07-03 RX ADMIN — HYDROMORPHONE HYDROCHLORIDE 0.5 MILLIGRAM(S): 2 INJECTION INTRAMUSCULAR; INTRAVENOUS; SUBCUTANEOUS at 01:30

## 2019-07-03 RX ADMIN — HYDROMORPHONE HYDROCHLORIDE 0.5 MILLIGRAM(S): 2 INJECTION INTRAMUSCULAR; INTRAVENOUS; SUBCUTANEOUS at 01:55

## 2019-07-03 RX ADMIN — OXYCODONE HYDROCHLORIDE 10 MILLIGRAM(S): 5 TABLET ORAL at 08:31

## 2019-07-03 RX ADMIN — OXYCODONE HYDROCHLORIDE 5 MILLIGRAM(S): 5 TABLET ORAL at 03:15

## 2019-07-03 RX ADMIN — Medication 112 MICROGRAM(S): at 06:08

## 2019-07-03 NOTE — DISCHARGE NOTE PROVIDER - CARE PROVIDER_API CALL
Nile Richard)  Orthopedics  [address], [address]  [place] [address]  Phone: [phone]  Fax: [phone]  Follow Up Time:

## 2019-07-03 NOTE — PROGRESS NOTE ADULT - PROBLEM SELECTOR PLAN 4
-Elevated FS today as patient did not receive lantus last night  -Would restart patient's home dose insulin but at a lower dose given patient's appetite might not be normal during hospital stay  -Recommend lantus 25u and humalog 5 TID, c/w sliding scale
-C/w home medication of norvasc

## 2019-07-03 NOTE — PROGRESS NOTE ADULT - PROBLEM SELECTOR PLAN 1
-right humerus lesion from metastatic lung cancer  -s/p ORIF of right humerus per ortho on 7/2  -Pain management per ortho  -PT/OT

## 2019-07-03 NOTE — BRIEF OPERATIVE NOTE - ASSISTANT(S)
Patient had Mohs on 10/18 and would like to know if she can have a refill on Hydrocodone, Please call to advise   Anil Coy

## 2019-07-03 NOTE — DISCHARGE NOTE NURSING/CASE MANAGEMENT/SOCIAL WORK - NSDCDPATPORTLINK_GEN_ALL_CORE
You can access the [facility] Patient Portal, offered by [facility], by registering with the following website: [email or web address]

## 2019-07-03 NOTE — PROGRESS NOTE ADULT - PROBLEM SELECTOR PLAN 2
-Patient with good functional capacity 2 months prior to admission. Declining functional status due to pain  -Patient without any cardiac history. But has RCRI risk of 10% cardiovascular complications given h/o insulin use and CKD  -Seen by cardiology given LE edema and b/l pleural effusions. TTE with normal LV systolic and diastolic function. Case discussed with cardiologist Dr. Stevenson and edema likely non cardiogenic. Elevated troponin could be due to CKD.   -At this time no indication for further ischemic workup.   -Patient is optimized from cardiac and medical perspective.
-sodium today is 131  -Suspect from SIADH, fluid overload (non cardiogenic)  -Recommend 1L fluid restriction for now.   -Avoid hypotonic IVF perioperatively.   -Daily BMP

## 2019-07-03 NOTE — DISCHARGE NOTE NURSING/CASE MANAGEMENT/SOCIAL WORK - NSDCPNINST_GEN_ALL_CORE
Call MD for follow up appointment. Maintain shoulder incision and dressing clean dry and intact. Call MD with any signs of infection: ie; fever, redness, drainage, or with signs of bleeding, unrelieved increased pain, or persistent nausea. Continue to drink plenty of fluids. Avoid strenuous activity and constipation which may be a side effect from taking certain medications and narcotics. Safety and fall prevention measures reinforced.

## 2019-07-03 NOTE — DISCHARGE NOTE PROVIDER - NSDCCPCAREPLAN_GEN_ALL_CORE_FT
PRINCIPAL DISCHARGE DIAGNOSIS  Diagnosis: Fracture of humerus, proximal  Assessment and Plan of Treatment:

## 2019-07-03 NOTE — PROGRESS NOTE ADULT - SUBJECTIVE AND OBJECTIVE BOX
Patient seen and examined at bedside.    Overnight Events: s/p ORIF of R humerus yesterday evening. This morning he endorses RUE pain, but denies chest pain, shortness of breath, palpitations, LE edema.       REVIEW OF SYSTEMS:  Constitutional:     No fevers, chills, weight loss, weight gain  HEENT:                  No dry eyes, nasal congestion, postnasal drip  CV:                         No chest pain, palpitations, orthopnea, PND  Resp:                     No cough, SOB, dyspnea, wheezing, sputum  GI:                          No nausea, vomiting, abdominal pain, diarrhea, constipation  :                        No dysuria, nocturia, hematuria, increased urinary frequency  Musculoskeletal:  see above  Skin:                       No rash, pruritus, ecchymoses  Neurological:        No headache, dizziness, syncope, weakness, numbness  Psychiatric:           No anxiety, depression   Endocrine:            No hot/cold intolerance, polydipsia  Heme/Lymph:      No bleeding, easy bruising  Allergic/Immune: No itchy eyes, rhinorrhea, hives angioedema      Current Meds:  acetaminophen   Tablet .. 650 milliGRAM(s) Oral every 6 hours PRN  ALBUTerol    90 MICROgram(s) HFA Inhaler 2 Puff(s) Inhalation every 6 hours PRN  aspirin 325 milliGRAM(s) Oral two times a day  ceFAZolin   IVPB 2000 milliGRAM(s) IV Intermittent every 8 hours  dextrose 40% Gel 15 Gram(s) Oral once PRN  dextrose 5%. 1000 milliLiter(s) IV Continuous <Continuous>  dextrose 50% Injectable 12.5 Gram(s) IV Push once  dextrose 50% Injectable 25 Gram(s) IV Push once  dextrose 50% Injectable 25 Gram(s) IV Push once  docusate sodium 100 milliGRAM(s) Oral three times a day  fentaNYL   Patch  75 MICROgram(s)/Hr 1 Patch Transdermal every 72 hours  furosemide    Tablet 20 milliGRAM(s) Oral daily  glucagon  Injectable 1 milliGRAM(s) IntraMuscular once PRN  HYDROmorphone  Injectable 0.5 milliGRAM(s) IV Push every 10 minutes PRN  insulin glargine Injectable (LANTUS) 25 Unit(s) SubCutaneous at bedtime  insulin lispro (HumaLOG) corrective regimen sliding scale   SubCutaneous three times a day before meals  insulin lispro Injectable (HumaLOG) 5 Unit(s) SubCutaneous three times a day before meals  lactated ringers. 1000 milliLiter(s) IV Continuous <Continuous>  levothyroxine 112 MICROGram(s) Oral daily  ondansetron Injectable 4 milliGRAM(s) IV Push once  oxyCODONE    IR 10 milliGRAM(s) Oral every 4 hours PRN  oxyCODONE    IR 5 milliGRAM(s) Oral every 4 hours PRN  senna 2 Tablet(s) Oral at bedtime  simvastatin 20 milliGRAM(s) Oral at bedtime  tamsulosin 0.4 milliGRAM(s) Oral at bedtime      PAST MEDICAL & SURGICAL HISTORY:  Anxiety  Constricted pupils: for several years states he was seen by many eye doctors no cause known  Hypertension  Sleep apnea: 2012 done in [place] studies  no longer using CPAP  High cholesterol  Anxiety: before procedures  ETOH abuse: last drink 28 yrs ago   AA meeting weekly  Vocal cord cancer: 1996 treated with chemo  Hypothyroid  Diabetes: type 2  Lung nodule: right 2016   repeat ct scan 2017 increased  size  Mets to right humerus bone  Cancer of vocal cord: 1996 biopsy  S/P cholecystectomy  S/P knee replacement: bilateral, 2011      Vitals:  T(F): 97.2 (07-03), Max: 98.3 (07-02)  HR: 80 (07-03) (71 - 92)  BP: 115/63 (07-03) (93/67 - 134/51)  RR: 16 (07-03)  SpO2: 98% (07-03)  I&O's Summary    02 Jul 2019 07:01  -  03 Jul 2019 07:00  --------------------------------------------------------  IN: 455 mL / OUT: 25.5 mL / NET: 429.5 mL        Physical Exam:  Appearance: No acute distress; well appearing  Eyes: PERRL, EOMI, pink conjunctiva  HENT: Normal oral mucosa  Cardiovascular: RRR, S1, S2, no murmurs, rubs, or gallops; no edema   Respiratory: Clear to auscultation bilaterally  Gastrointestinal: soft, non-tender, non-distended with normal bowel sounds  Musculoskeletal: RUE wrapped and in a sling, R hand edematous  Neurologic: Non-focal  Lymphatic: No lymphadenopathy  Psychiatry: AAOx3, mood & affect appropriate  Skin: No rashes, ecchymoses, or cyanosis                          8.4    10.24 )-----------( 279      ( 03 Jul 2019 02:00 )             26.4     07-03    130<L>  |  95<L>  |  27<H>  ----------------------------<  231<H>  4.3   |  20<L>  |  1.22    Ca    8.4      03 Jul 2019 02:00  Mg     1.9     07-02      PT/INR - ( 01 Jul 2019 17:52 )   PT: 15.1 SEC;   INR: 1.35          PTT - ( 01 Jul 2019 17:52 )  PTT:29.2 SEC      Serum Pro-Brain Natriuretic Peptide: 1111 pg/mL (07-01 @ 22:45)          New ECG(s): Personally reviewed    Echo:  < from: Transthoracic Echocardiogram (07.02.19 @ 07:54) >  DIMENSIONS:  Dimensions:     Normal Values:  LA:     3.5 cm    2.0 - 4.0 cm  Ao:     3.1 cm    2.0 - 3.8 cm  SEPTUM: 1.0 cm    0.6 - 1.2 cm  PWT:    1.2 cm    0.6 - 1.1 cm  LVIDd:  4.2 cm    3.0 - 5.6 cm  LVIDs:  2.7 cm    1.8 - 4.0 cm  Derived Variables:  LVMI: 78 g/m2  RWT: 0.57  Fractional short: 36 %  Ejection Fraction (Teicholtz): 66 %  ------------------------------------------------------------------------  CONCLUSIONS:  1. Mitral annular calcification, otherwise normal mitral  valve. Mild mitral regurgitation.  2. Calcified trileaflet aortic valve with normal opening.  Peak transaortic valve gradient equals 23 mm Hg, mean  transaortic valve gradient equals 11 mm Hg, estimated  aortic valve area equals 1.9 sqcm (by continuity equation),  consistent with mild aortic stenosis. Mild aortic  regurgitation.  3. Normal left ventricular internal dimensionsand wall  thicknesses.  4. Normal left ventricular systolic function. No segmental  wall motion abnormalities.  5. The right ventricle is not well visualized; grossly  normal right ventricular systolic function.  6. Normal tricuspid valve. Minimal tricuspid regurgitation.  7. Estimated pulmonary artery systolic pressure equals 34  mm Hg, assuming right atrial pressure equals 10  mm Hg,  consistent with normal pulmonary pressures.    < end of copied text >      Interpretation of Telemetry: not on tele

## 2019-07-03 NOTE — PROGRESS NOTE ADULT - PROBLEM SELECTOR PLAN 3
-Corrected sodium for glucose today is 131  -Suspect from SIADH, fluid overload (non cardiogenic)  -Would get urine osm and urine sodium  -Recommend 1L fluid restriction for now.   -Avoid hypotonic IVF perioperatively.   -Daily BMP
-Recommend lantus 30u and humalog 7 TID, c/w sliding scale  on discharge resume home regimen of lantus 30u and humalog 10TID

## 2019-07-03 NOTE — PROGRESS NOTE ADULT - SUBJECTIVE AND OBJECTIVE BOX
ORTHO PROGRESS NOTE     Patient resting comfortable without complaint    Vital Signs Last 24 Hrs  T(C): 36.2 (03 Jul 2019 05:17), Max: 37 (02 Jul 2019 08:45)  T(F): 97.2 (03 Jul 2019 05:17), Max: 98.6 (02 Jul 2019 08:45)  HR: 80 (03 Jul 2019 05:17) (68 - 92)  BP: 115/63 (03 Jul 2019 05:17) (93/67 - 137/43)  BP(mean): 80 (03 Jul 2019 03:00) (75 - 87)  RR: 16 (03 Jul 2019 05:17) (16 - 26)  SpO2: 98% (03 Jul 2019 05:17) (96% - 100%)    Shoulder: Dressing clean/dry/intact, drain with SS output                     AIN/PIN/MUR intact                     hand SILT, wwp        A/P:  78yMale  -PT, WBAT RUE  -FU with Jose Manuel regarding hemovac  -Pain control   -Incentive spirometer  -dispo planning

## 2019-07-03 NOTE — PROGRESS NOTE ADULT - PROBLEM SELECTOR PLAN 8
-Recommend to c/w home fentanyl patch  -Nutritional consult  -F/u with oncologist for outpt management
LE edema possibly from venous insufficiency  C/w lasix 20mg daily  Monitor, elevated ext as much as possible

## 2019-07-03 NOTE — PROGRESS NOTE ADULT - SUBJECTIVE AND OBJECTIVE BOX
ORTHO POST OP CHECK    S: Pt seen and examined. Doing well postoperatively. Pain well controlled. Denies N/V/CP/SOB.       O:   PE:  Gen: NAD, laying comfortably in bed  Resp: Unlabored breathing  MSK:   Dressing c/d/i   + AIN/PIN/U/Radial/M  C5-T1 SILT  compartments soft  radial pulse 2+                          8.4    10.24 )-----------( 279      ( 03 Jul 2019 02:00 )             26.4     07-03    130<L>  |  95<L>  |  27<H>  ----------------------------<  231<H>  4.3   |  20<L>  |  1.22    Ca    8.4      03 Jul 2019 02:00  Mg     1.9     07-02        Vital Signs Last 24 Hrs  T(C): 36.5 (03 Jul 2019 01:20), Max: 37 (02 Jul 2019 08:45)  T(F): 97.7 (03 Jul 2019 01:20), Max: 98.6 (02 Jul 2019 08:45)  HR: 71 (03 Jul 2019 03:00) (68 - 92)  BP: 127/60 (03 Jul 2019 03:00) (93/67 - 150/81)  BP(mean): 80 (03 Jul 2019 03:00) (75 - 87)  RR: 21 (03 Jul 2019 03:00) (16 - 26)  SpO2: 100% (03 Jul 2019 03:00) (96% - 100%)  I&O's Summary    01 Jul 2019 07:01  -  02 Jul 2019 07:00  --------------------------------------------------------  IN: 0 mL / OUT: 400 mL / NET: -400 mL        A/P: 78yoM s/p R proximal humerus excision and ORIF    -Neuro: Multimodal pain control  -Resp: IS  -GI: reg diet  -MSK: OOB, WBAT, PT/OT  -Heme: DVT PPx    Ortho

## 2019-07-03 NOTE — PROGRESS NOTE ADULT - ASSESSMENT
78M with PMH of hypertension, hyperlipidemia, hypothyroidism, COPD/ Empysema, CKD, DM2, vocal cord CA s/p RT 1996, lung cancer s/p chemo and RT, lung cancer metastasized to the right humerus s/p ORIF of right humerus on 7/2/19. Cardiology consulted for pulmonary edema and pleural effusions in CXR. TTE with normal systolic and diastolic function. Do not think pulmonary edema is cardiac in nature.     - Patient doing well post-operatively with no CP, respiratory distress, signs/symptoms of heart failure  - Continue home Lasix, statin   - May resume home amlodipine if needed for better BP control    Cardiology will sign off. Please call if additional questions arise.     SHADI Stallworth MD  Cardiology Fellow  [id number]  All cardiology service information may be found 24/7 on [email or web address], password: [email or web address]
79 yo M with PMH of lung cancer now mets to right humerus presenting for medical optimization for ORIF of right shoulder found to have hyponatremia
79 yo M with PMH of lung cancer now mets to right humerus presenting for medical optimization for ORIF of right shoulder found to have hyponatremia

## 2019-07-03 NOTE — BRIEF OPERATIVE NOTE - NSICDXBRIEFPREOP_GEN_ALL_CORE_FT
PRE-OP DIAGNOSIS:  Lesion of humerus 03-Jul-2019 01:49:22  Anil Coy  Lesion of humerus 03-Jul-2019 01:49:15  Anil Coy

## 2019-07-03 NOTE — DISCHARGE NOTE PROVIDER - HOSPITAL COURSE
79 yo is s/p  above without any intraoperative complications.  Pt is weight bear as tolerated right upper extremity in sling at all times doing well and stable for discharge home.  D/C sutures/staples POD 14 .  call surgeon's office one week to make appt.

## 2019-07-03 NOTE — PROGRESS NOTE ADULT - PROBLEM SELECTOR PLAN 7
-c/w home dose synthroid
-Recommend to c/w home fentanyl patch  -Nutritional consult  -F/u with oncologist for outpt management

## 2019-07-03 NOTE — PROGRESS NOTE ADULT - SUBJECTIVE AND OBJECTIVE BOX
Patient is a 78y old  Male who presents with a chief complaint of Hyponatremia (02 Jul 2019 06:12)    SUBJECTIVE / OVERNIGHT EVENTS: Reports excruciating pain after surgery. Reports pain is better currently. No chest pain or SOB. Eating well. Would like to go home today if possible.      MEDICATIONS  (STANDING):  dextrose 5%. 1000 milliLiter(s) (50 mL/Hr) IV Continuous <Continuous>  dextrose 50% Injectable 12.5 Gram(s) IV Push once  dextrose 50% Injectable 25 Gram(s) IV Push once  dextrose 50% Injectable 25 Gram(s) IV Push once  docusate sodium 100 milliGRAM(s) Oral three times a day  fentaNYL   Patch  75 MICROgram(s)/Hr 1 Patch Transdermal every 72 hours  furosemide    Tablet 20 milliGRAM(s) Oral daily  gabapentin 800 milliGRAM(s) Oral Once  insulin lispro (HumaLOG) corrective regimen sliding scale   SubCutaneous every 6 hours  levothyroxine 112 MICROGram(s) Oral daily  senna 2 Tablet(s) Oral at bedtime  simvastatin 20 milliGRAM(s) Oral at bedtime  tamsulosin 0.4 milliGRAM(s) Oral at bedtime    MEDICATIONS  (PRN):  acetaminophen   Tablet .. 650 milliGRAM(s) Oral every 6 hours PRN Mild Pain (1 - 3)  ALBUTerol    90 MICROgram(s) HFA Inhaler 2 Puff(s) Inhalation every 6 hours PRN Bronchospasm  dextrose 40% Gel 15 Gram(s) Oral once PRN Blood Glucose LESS THAN 70 milliGRAM(s)/deciliter  glucagon  Injectable 1 milliGRAM(s) IntraMuscular once PRN Glucose LESS THAN 70 milligrams/deciliter  oxyCODONE    IR 2.5 milliGRAM(s) Oral every 4 hours PRN Moderate Pain (4 - 6)  oxyCODONE    IR 5 milliGRAM(s) Oral every 4 hours PRN Severe Pain (7 - 10)    Vital Signs Last 24 Hrs  T(C): 37 (03 Jul 2019 08:56), Max: 37 (03 Jul 2019 08:56)  T(F): 98.6 (03 Jul 2019 08:56), Max: 98.6 (03 Jul 2019 08:56)  HR: 85 (03 Jul 2019 08:56) (71 - 92)  BP: 118/69 (03 Jul 2019 08:56) (93/67 - 134/51)  BP(mean): 80 (03 Jul 2019 03:00) (75 - 87)  RR: 17 (03 Jul 2019 08:56) (16 - 26)  SpO2: 95% (03 Jul 2019 08:56) (95% - 100%)      PHYSICAL EXAM:  GENERAL: NAD, well-developed, elderly appearing  HEAD:  Atraumatic, Normocephalic  EYES: EOMI, PERRLA, conjunctiva and sclera clear  NECK: Supple,  CHEST/LUNG: decreased BS at bases, mostly clear lungs   HEART: Regular rate and rhythm  ABDOMEN: Soft, Nontender, Nondistended; Bowel sounds present  EXTREMITIES:  1+ edema b/l with chronic venous changes, right arm in sling, drain in place   PSYCH: AAOx3  NEUROLOGY: non-focal  SKIN: chronic venous skin changes of LE    LABS:                                   8.4    10.24 )-----------( 279      ( 03 Jul 2019 02:00 )             26.4   07-03    130<L>  |  95<L>  |  27<H>  ----------------------------<  231<H>  4.3   |  20<L>  |  1.22    Ca    8.4      03 Jul 2019 02:00  Mg     1.9     07-02      RADIOLOGY & ADDITIONAL TESTS: < from: Transthoracic Echocardiogram (07.02.19 @ 07:54) >  3. Normal left ventricular internal dimensionsand wall  thicknesses.    < end of copied text >      Imaging Personally Reviewed: < from: Xray Chest 1 View- PORTABLE-Urgent (07.01.19 @ 19:11) >  1. No focal consolidation.  2. Bilateral trace effusions.  3. Large hiatal hernia.    < end of copied text >      Consultant(s) Notes Reviewed:  cardiology    Care Discussed with Consultants/Other Providers: cardiology and ortho    Assessment and Plan:

## 2019-07-03 NOTE — PROGRESS NOTE ADULT - SUBJECTIVE AND OBJECTIVE BOX
ANESTHESIA POSTOP CHECK    78y Male POSTOP DAY 1     Vital Signs Last 24 Hrs  T(C): 37 (03 Jul 2019 08:56), Max: 37 (03 Jul 2019 08:56)  T(F): 98.6 (03 Jul 2019 08:56), Max: 98.6 (03 Jul 2019 08:56)  HR: 85 (03 Jul 2019 08:56) (71 - 92)  BP: 118/69 (03 Jul 2019 08:56) (93/67 - 134/51)  BP(mean): 80 (03 Jul 2019 03:00) (75 - 87)  RR: 17 (03 Jul 2019 08:56) (16 - 26)  SpO2: 95% (03 Jul 2019 08:56) (95% - 100%)  I&O's Summary    02 Jul 2019 07:01  -  03 Jul 2019 07:00  --------------------------------------------------------  IN: 455 mL / OUT: 25.5 mL / NET: 429.5 mL    03 Jul 2019 07:01  -  03 Jul 2019 10:21  --------------------------------------------------------  IN: 0 mL / OUT: 605 mL / NET: -605 mL        NO APPARENT ANESTHESIA COMPLICATIONS

## 2019-07-12 ENCOUNTER — LABORATORY RESULT (OUTPATIENT)
Age: 79
End: 2019-07-12

## 2019-07-12 ENCOUNTER — RESULT REVIEW (OUTPATIENT)
Age: 79
End: 2019-07-12

## 2019-07-12 ENCOUNTER — APPOINTMENT (OUTPATIENT)
Age: 79
End: 2019-07-12

## 2019-07-12 ENCOUNTER — APPOINTMENT (OUTPATIENT)
Dept: HEMATOLOGY ONCOLOGY | Facility: CLINIC | Age: 79
End: 2019-07-12
Payer: MEDICARE

## 2019-07-12 LAB
BASOPHILS # BLD AUTO: 0.1 K/UL — SIGNIFICANT CHANGE UP (ref 0–0.2)
BASOPHILS NFR BLD AUTO: 0.7 % — SIGNIFICANT CHANGE UP (ref 0–2)
EOSINOPHIL # BLD AUTO: 0.3 K/UL — SIGNIFICANT CHANGE UP (ref 0–0.5)
EOSINOPHIL NFR BLD AUTO: 4.2 % — SIGNIFICANT CHANGE UP (ref 0–6)
HCT VFR BLD CALC: 26.3 % — LOW (ref 39–50)
HGB BLD-MCNC: 8.2 G/DL — LOW (ref 13–17)
LYMPHOCYTES # BLD AUTO: 1.2 K/UL — SIGNIFICANT CHANGE UP (ref 1–3.3)
LYMPHOCYTES # BLD AUTO: 16.7 % — SIGNIFICANT CHANGE UP (ref 13–44)
MCHC RBC-ENTMCNC: 27 PG — SIGNIFICANT CHANGE UP (ref 27–34)
MCHC RBC-ENTMCNC: 31.3 G/DL — LOW (ref 32–36)
MCV RBC AUTO: 86.3 FL — SIGNIFICANT CHANGE UP (ref 80–100)
MONOCYTES # BLD AUTO: 0.7 K/UL — SIGNIFICANT CHANGE UP (ref 0–0.9)
MONOCYTES NFR BLD AUTO: 8.9 % — SIGNIFICANT CHANGE UP (ref 2–14)
NEUTROPHILS # BLD AUTO: 5.1 K/UL — SIGNIFICANT CHANGE UP (ref 1.8–7.4)
NEUTROPHILS NFR BLD AUTO: 69.5 % — SIGNIFICANT CHANGE UP (ref 43–77)
PLATELET # BLD AUTO: 343 K/UL — SIGNIFICANT CHANGE UP (ref 150–400)
RBC # BLD: 3.04 M/UL — LOW (ref 4.2–5.8)
RBC # FLD: 16.4 % — HIGH (ref 10.3–14.5)
WBC # BLD: 7.3 K/UL — SIGNIFICANT CHANGE UP (ref 3.8–10.5)
WBC # FLD AUTO: 7.3 K/UL — SIGNIFICANT CHANGE UP (ref 3.8–10.5)

## 2019-07-12 PROCEDURE — 99213 OFFICE O/P EST LOW 20 MIN: CPT

## 2019-07-12 RX ORDER — ERGOCALCIFEROL 1.25 MG/1
1.25 CAPSULE ORAL
Qty: 0 | Refills: 0 | DISCHARGE

## 2019-07-12 RX ORDER — FUROSEMIDE 40 MG
1 TABLET ORAL
Qty: 0 | Refills: 0 | DISCHARGE

## 2019-07-12 RX ORDER — LEVOTHYROXINE SODIUM 125 MCG
1 TABLET ORAL
Qty: 0 | Refills: 0 | DISCHARGE

## 2019-07-12 RX ORDER — CALCITRIOL 0.5 UG/1
1 CAPSULE ORAL
Qty: 0 | Refills: 0 | DISCHARGE

## 2019-07-12 RX ORDER — GABAPENTIN 400 MG/1
1 CAPSULE ORAL
Qty: 0 | Refills: 0 | DISCHARGE

## 2019-07-12 RX ORDER — ALPRAZOLAM 0.25 MG
1 TABLET ORAL
Qty: 0 | Refills: 0 | DISCHARGE

## 2019-07-12 RX ORDER — FLUOXETINE HCL 10 MG
1 CAPSULE ORAL
Qty: 0 | Refills: 0 | DISCHARGE

## 2019-07-12 RX ORDER — FENTANYL CITRATE 50 UG/ML
1 INJECTION INTRAVENOUS
Qty: 0 | Refills: 0 | DISCHARGE

## 2019-07-12 RX ORDER — INSULIN LISPRO 100/ML
10 VIAL (ML) SUBCUTANEOUS
Qty: 0 | Refills: 0 | DISCHARGE

## 2019-07-12 RX ORDER — ENOXAPARIN SODIUM 100 MG/ML
30 INJECTION SUBCUTANEOUS
Qty: 0 | Refills: 0 | DISCHARGE

## 2019-07-12 RX ORDER — PROCHLORPERAZINE MALEATE 5 MG
1 TABLET ORAL
Qty: 0 | Refills: 0 | DISCHARGE

## 2019-07-12 RX ORDER — SIMVASTATIN 20 MG/1
1 TABLET, FILM COATED ORAL
Qty: 0 | Refills: 0 | DISCHARGE

## 2019-07-12 RX ORDER — ALBUTEROL 90 UG/1
2 AEROSOL, METERED ORAL
Qty: 0 | Refills: 0 | DISCHARGE

## 2019-07-12 RX ORDER — DARBEPOETIN ALFA IN POLYSORBAT 200MCG/0.4
0 PEN INJECTOR (ML) SUBCUTANEOUS
Qty: 0 | Refills: 0 | DISCHARGE

## 2019-07-12 NOTE — ASSESSMENT
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 On Nivolumab immunotherapy, and now S/P surgery Right Humerus, with improvement in function and pain, Wife was asking about using oxycodone for pain, he is due to see the surgeon on 7-15-19, will stay with Dilauid for now, until evaluated by surgeon and then should contact Dr. Miranda regarding switching analgesic. He has an appt  to see Dr. Deng on 7-30-19, Today he received aranesp, xgeva and Nivolumab. HGb is 8.2 but not significantly symptomatic, will hold off on transfusion and monitor on Aranesp. Told to call if gets symptomatic and if another MD checks his CBC

## 2019-07-12 NOTE — PHYSICAL EXAM
[Ambulatory and capable of all self care but unable to carry out any work activities] : Status 2- Ambulatory and capable of all self care but unable to carry out any work activities. Up and about more than 50% of waking hours [Normal] : affect appropriate [de-identified] : multiple staples in place right upper arm, wound healing nicely, no swelling noted

## 2019-07-12 NOTE — HISTORY OF PRESENT ILLNESS
[de-identified] : Mr. 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 Denny quit smoking in 1996. He is a retired  with asbestos exposure and pleural plaques on CXR.\par In August 2017 he completed 5000 cGy for a [id number] adenocarcinoma of the RUL lung. A PET scan in 12/17 showed the RUL nodule smaller and less PET-avid, but a new RUL nodule and new right paratracheal adenopathy were seen as well as a new right proximal humerus lesion. Bronchoscopy and endobronchial ultrasound could not confirm malignancy on cytopathology, but nuclear bone scan confirmed the humerus lesion as metastatic. Initiated Nivolumab immunotherapy with good tolerance, along with radiation therapy to right humerus as per Dr. Ellison (planned 2000 cGy). However, right upper extremity still with significant pain.  [de-identified] : He is 10 days post op , orthopedic procedure for Rt humurus metastatic lesion. His pain is better and able to move his right arm more. Wife was asking about switching to oxycodone for pain, ( He had this post op). He is being followed by Dr. Miranda for pain management.

## 2019-07-15 ENCOUNTER — RX RENEWAL (OUTPATIENT)
Age: 79
End: 2019-07-15

## 2019-07-15 ENCOUNTER — APPOINTMENT (OUTPATIENT)
Dept: INTERNAL MEDICINE | Facility: CLINIC | Age: 79
End: 2019-07-15
Payer: MEDICARE

## 2019-07-15 VITALS — BODY MASS INDEX: 28.06 KG/M2 | HEIGHT: 70 IN | WEIGHT: 196 LBS

## 2019-07-15 VITALS — HEART RATE: 80 BPM | DIASTOLIC BLOOD PRESSURE: 72 MMHG | RESPIRATION RATE: 12 BRPM | SYSTOLIC BLOOD PRESSURE: 136 MMHG

## 2019-07-15 DIAGNOSIS — E87.1 HYPO-OSMOLALITY AND HYPONATREMIA: ICD-10-CM

## 2019-07-15 DIAGNOSIS — Z51.11 ENCOUNTER FOR ANTINEOPLASTIC CHEMOTHERAPY: ICD-10-CM

## 2019-07-15 DIAGNOSIS — Z91.81 HISTORY OF FALLING: ICD-10-CM

## 2019-07-15 DIAGNOSIS — J43.9 EMPHYSEMA, UNSPECIFIED: ICD-10-CM

## 2019-07-15 DIAGNOSIS — N18.3 CHRONIC KIDNEY DISEASE, STAGE 3 (MODERATE): ICD-10-CM

## 2019-07-15 PROCEDURE — 99214 OFFICE O/P EST MOD 30 MIN: CPT | Mod: 25

## 2019-07-15 PROCEDURE — 36415 COLL VENOUS BLD VENIPUNCTURE: CPT

## 2019-07-15 NOTE — HISTORY OF PRESENT ILLNESS
[FreeTextEntry1] : s/p orif\par post op anemia\par had hyponatremia prior to that [de-identified] : s/p ORIF, patient surgery done evening of July 2nd\par he has been stable, he has no chest pain sob nvd \par did fall yesterday,\par he needs cbc and basic profile to follow up anemia and sodium

## 2019-07-15 NOTE — ASSESSMENT
[FreeTextEntry1] : fall risk safety precautions, use a cane\par ortho follow up\par check lytes and cbc\par pain control\par lung cancer follow up with heme onc

## 2019-07-15 NOTE — PHYSICAL EXAM
[Normal] : no posterior cervical lymphadenopathy and no anterior cervical lymphadenopathy [de-identified] : frail a bit sleepy [de-identified] : right arm wound is heeling well

## 2019-07-16 ENCOUNTER — APPOINTMENT (OUTPATIENT)
Dept: ORTHOPEDIC SURGERY | Facility: CLINIC | Age: 79
End: 2019-07-16
Payer: MEDICARE

## 2019-07-16 ENCOUNTER — MEDICATION RENEWAL (OUTPATIENT)
Age: 79
End: 2019-07-16

## 2019-07-16 LAB
ANION GAP SERPL CALC-SCNC: 13 MMOL/L
BASOPHILS # BLD AUTO: 0.05 K/UL
BASOPHILS NFR BLD AUTO: 0.5 %
BUN SERPL-MCNC: 28 MG/DL
CALCIUM SERPL-MCNC: 9.3 MG/DL
CHLORIDE SERPL-SCNC: 94 MMOL/L
CO2 SERPL-SCNC: 25 MMOL/L
CREAT SERPL-MCNC: 1.58 MG/DL
EOSINOPHIL # BLD AUTO: 0.16 K/UL
EOSINOPHIL NFR BLD AUTO: 1.6 %
HCT VFR BLD CALC: 28.5 %
HGB BLD-MCNC: 8.3 G/DL
IMM GRANULOCYTES NFR BLD AUTO: 0.8 %
LYMPHOCYTES # BLD AUTO: 1.17 K/UL
LYMPHOCYTES NFR BLD AUTO: 11.9 %
MAN DIFF?: NORMAL
MCHC RBC-ENTMCNC: 26.2 PG
MCHC RBC-ENTMCNC: 29.1 GM/DL
MCV RBC AUTO: 89.9 FL
MONOCYTES # BLD AUTO: 0.72 K/UL
MONOCYTES NFR BLD AUTO: 7.3 %
NEUTROPHILS # BLD AUTO: 7.63 K/UL
NEUTROPHILS NFR BLD AUTO: 77.9 %
PLATELET # BLD AUTO: 332 K/UL
POTASSIUM SERPL-SCNC: 5.4 MMOL/L
RBC # BLD: 3.17 M/UL
RBC # FLD: 17.2 %
SODIUM SERPL-SCNC: 132 MMOL/L
T4 FREE SERPL-MCNC: 1.1 NG/DL
TSH SERPL-ACNC: 6.84 UIU/ML
WBC # FLD AUTO: 9.81 K/UL

## 2019-07-16 PROCEDURE — 73060 X-RAY EXAM OF HUMERUS: CPT | Mod: RT

## 2019-07-16 PROCEDURE — 99024 POSTOP FOLLOW-UP VISIT: CPT

## 2019-07-16 NOTE — HISTORY OF PRESENT ILLNESS
[de-identified] : Right Humerus  [de-identified] : Patient was seen today in followup 2 weeks following exploration wide resection of metastatic lung carcinoma from the right proximal humerus and internal fixation using a locking plate the surgical procedure was without any complications [de-identified] : On examination today his surgical wound healed nice staples were removed patient regained good motion of the shoulder and elbow and at this stage patient was recommended to be followed and to be seen again in 4 months for followup

## 2019-07-17 LAB — SURGICAL PATHOLOGY STUDY: SIGNIFICANT CHANGE UP

## 2019-07-18 ENCOUNTER — CHART COPY (OUTPATIENT)
Age: 79
End: 2019-07-18

## 2019-07-19 ENCOUNTER — APPOINTMENT (OUTPATIENT)
Dept: RADIATION ONCOLOGY | Facility: CLINIC | Age: 79
End: 2019-07-19
Payer: MEDICARE

## 2019-07-19 VITALS
OXYGEN SATURATION: 92 % | HEIGHT: 70 IN | DIASTOLIC BLOOD PRESSURE: 82 MMHG | WEIGHT: 195 LBS | HEART RATE: 77 BPM | RESPIRATION RATE: 16 BRPM | BODY MASS INDEX: 27.92 KG/M2 | SYSTOLIC BLOOD PRESSURE: 142 MMHG

## 2019-07-19 PROCEDURE — 99214 OFFICE O/P EST MOD 30 MIN: CPT

## 2019-07-19 NOTE — PHYSICAL EXAM
[Normal] : normal skin color and pigmentation and no rash [de-identified] : right arm in sling, well healing scar, c/d/i; tenderness to palpation of right proximal humerus; right shoulder ROM limited by discomfort, though able to raise >90'

## 2019-07-19 NOTE — HISTORY OF PRESENT ILLNESS
[FreeTextEntry1] : 79 year old gentleman with stage IV adenocarcinoma of lung, initially treated SBRT to the right lung 50 Gy / 5 fx in 8/2017 for stage I disease, noted metastatic disease in 1/2018.  Previously completed palliative 8 Gy / 1 fx to the right humerus on 1/14/19, and re-treatment of the right humerus 20 Gy / 5 fx completed 4/16/19.\par \par Interim PET/CT 6/17/19 showed slight decreased FDG activity in the proximal right humeral diaphaseal metastasis, decreased size/FDG of right paratracheal/hilar lymph nodes, and unchanged RUL subcentimeter opacity.\par \par However, right arm pain was unbearable, and he elected to undergo localized resection as a palliative procedure with internal fixation.  He underwent exploration and wide resection of metastatic lesion of the right proximal humerus, and internal fixation on 7/2/19.  He reports recovering well post-operatively, though suffered a fall this past Sunday.\par \par Reports current 3/10 right arm pain with Oxycodone, Fentanyl 75mcg and gabapentin .  Denies edema, dyspnea, cough, headaches, nausea, vomiting, fatigue or weight loss. \par \par Follows with Dr. Miranda for pain control.  Saw Dr Daly 5/14/19, Dr. Peña 5/15/19.  Continues on nivolumab q4 weeks.\par

## 2019-07-19 NOTE — REASON FOR VISIT
[Routine Follow-Up] : routine follow-up visit for [Bone Metastasis] : bone metastasis [Lung Cancer] : lung cancer [Spouse] : spouse

## 2019-07-19 NOTE — VITALS
[Maximal Pain Intensity: 3/10] : 3/10 [Least Pain Intensity: 3/10] : 3/10 [Pain Description/Quality: ___] : Pain description/quality: [unfilled] [Pain Duration: ___] : Pain duration: [unfilled] [Pain Location: ___] : Pain Location: [unfilled] [Pain Interferes with ADLs] : Pain interferes with activities of daily living. [Opioid] : opioid [70: Cares for self; unalbe to carry on normal activity or do active work.] : 70: Cares for self; unable to carry on normal activity or do active work.

## 2019-07-19 NOTE — DISEASE MANAGEMENT
[Clinical] : TNM Stage: c [IV] : IV [FreeTextEntry4] : metastatic poorly differentiated carcinoma [TTNM] : 1b [NTNM] : x [MTNM] : 1

## 2019-07-19 NOTE — REVIEW OF SYSTEMS
[Negative] : Allergic/Immunologic [FreeTextEntry9] : right arm [Nausea: Grade 0] : Nausea: Grade 0 [Vomiting: Grade 0] : Vomiting: Grade 0 [Edema Limbs: Grade 0] : Edema Limbs: Grade 0  [Localized Edema: Grade 0] : Localized Edema: Grade 0  [Neck Edema: Grade 0] : Neck Edema: Grade 0 [Headache: Grade 0] : Headache: Grade 0 [Cough: Grade 0] : Cough: Grade 0 [Dyspnea: Grade 0] : Dyspnea: Grade 0 [Skin Hyperpigmentation: Grade 0] : Skin Hyperpigmentation: Grade 0 [Fatigue: Grade 1 - Fatigue relieved by rest] : Fatigue: Grade 1 - Fatigue relieved by rest [Dermatitis Radiation: Grade 0] : Dermatitis Radiation: Grade 0

## 2019-07-30 ENCOUNTER — APPOINTMENT (OUTPATIENT)
Dept: HEMATOLOGY ONCOLOGY | Facility: CLINIC | Age: 79
End: 2019-07-30
Payer: MEDICARE

## 2019-07-30 ENCOUNTER — APPOINTMENT (OUTPATIENT)
Age: 79
End: 2019-07-30

## 2019-07-30 ENCOUNTER — RESULT REVIEW (OUTPATIENT)
Age: 79
End: 2019-07-30

## 2019-07-30 VITALS
DIASTOLIC BLOOD PRESSURE: 68 MMHG | OXYGEN SATURATION: 95 % | WEIGHT: 193.25 LBS | HEIGHT: 70 IN | HEART RATE: 78 BPM | BODY MASS INDEX: 27.67 KG/M2 | TEMPERATURE: 98.4 F | SYSTOLIC BLOOD PRESSURE: 154 MMHG

## 2019-07-30 DIAGNOSIS — N18.9 CHRONIC KIDNEY DISEASE, UNSPECIFIED: ICD-10-CM

## 2019-07-30 DIAGNOSIS — D63.1 CHRONIC KIDNEY DISEASE, UNSPECIFIED: ICD-10-CM

## 2019-07-30 LAB
ALBUMIN SERPL ELPH-MCNC: 3.1 G/DL
ALP BLD-CCNC: 132 U/L
ALT SERPL-CCNC: 22 U/L
ANION GAP SERPL CALC-SCNC: 13 MMOL/L
AST SERPL-CCNC: 20 U/L
BASOPHILS # BLD AUTO: 0 K/UL — SIGNIFICANT CHANGE UP (ref 0–0.2)
BASOPHILS NFR BLD AUTO: 0.3 % — SIGNIFICANT CHANGE UP (ref 0–2)
BILIRUB SERPL-MCNC: 0.4 MG/DL
BUN SERPL-MCNC: 23 MG/DL
CALCIUM SERPL-MCNC: 8.1 MG/DL
CHLORIDE SERPL-SCNC: 94 MMOL/L
CO2 SERPL-SCNC: 21 MMOL/L
CREAT SERPL-MCNC: 1.32 MG/DL
EOSINOPHIL # BLD AUTO: 0 K/UL — SIGNIFICANT CHANGE UP (ref 0–0.5)
EOSINOPHIL NFR BLD AUTO: 0.5 % — SIGNIFICANT CHANGE UP (ref 0–6)
GLUCOSE SERPL-MCNC: 129 MG/DL
HCT VFR BLD CALC: 30.6 % — LOW (ref 39–50)
HGB BLD-MCNC: 9.4 G/DL — LOW (ref 13–17)
LYMPHOCYTES # BLD AUTO: 1.1 K/UL — SIGNIFICANT CHANGE UP (ref 1–3.3)
LYMPHOCYTES # BLD AUTO: 10.7 % — LOW (ref 13–44)
MCHC RBC-ENTMCNC: 26.1 PG — LOW (ref 27–34)
MCHC RBC-ENTMCNC: 30.5 G/DL — LOW (ref 32–36)
MCV RBC AUTO: 85.5 FL — SIGNIFICANT CHANGE UP (ref 80–100)
MONOCYTES # BLD AUTO: 0.7 K/UL — SIGNIFICANT CHANGE UP (ref 0–0.9)
MONOCYTES NFR BLD AUTO: 6.6 % — SIGNIFICANT CHANGE UP (ref 2–14)
NEUTROPHILS # BLD AUTO: 8.5 K/UL — HIGH (ref 1.8–7.4)
NEUTROPHILS NFR BLD AUTO: 82 % — HIGH (ref 43–77)
PLATELET # BLD AUTO: 408 K/UL — HIGH (ref 150–400)
POTASSIUM SERPL-SCNC: 4.7 MMOL/L
PROT SERPL-MCNC: 6.4 G/DL
RBC # BLD: 3.58 M/UL — LOW (ref 4.2–5.8)
RBC # FLD: 16.6 % — HIGH (ref 10.3–14.5)
SODIUM SERPL-SCNC: 128 MMOL/L
WBC # BLD: 10.3 K/UL — SIGNIFICANT CHANGE UP (ref 3.8–10.5)
WBC # FLD AUTO: 10.3 K/UL — SIGNIFICANT CHANGE UP (ref 3.8–10.5)

## 2019-07-30 PROCEDURE — 99214 OFFICE O/P EST MOD 30 MIN: CPT

## 2019-07-30 RX ORDER — FENTANYL 75 UG/H
75 PATCH, EXTENDED RELEASE TRANSDERMAL
Qty: 5 | Refills: 0 | Status: ACTIVE | COMMUNITY
Start: 2019-07-30 | End: 1900-01-01

## 2019-07-30 NOTE — PHYSICAL EXAM
[Normal] : affect appropriate [de-identified] : Right arm sling following recent resection of metastatic right proximal humerus lesion [de-identified] : Dysesthesia/pain right hand, with mild  weakness

## 2019-07-30 NOTE — ASSESSMENT
[FreeTextEntry1] : Adenocarcinoma of the lung, metastatic to bone, with recent successful, surgery, to resect painful metastatic disease in the right proximal humerus.

## 2019-07-30 NOTE — REVIEW OF SYSTEMS
[Fatigue] : fatigue [Fever] : no fever [Negative] : Heme/Lymph [FreeTextEntry9] : Gradually improving pain in the right arm following surgery

## 2019-07-30 NOTE — HISTORY OF PRESENT ILLNESS
[de-identified] : Markedly fatigued.\par Pain is gradually decreasing, following the recent wide resection metastatic adenocarcinoma of the lung to the proximal right humerus, and the internal fixation and locking plate performed by .\par We continue with nivolumab immunotherapy as well as intermittent doses of Aranesp for renal anemia.\par

## 2019-07-31 ENCOUNTER — OUTPATIENT (OUTPATIENT)
Dept: OUTPATIENT SERVICES | Facility: HOSPITAL | Age: 79
LOS: 1 days | Discharge: HOME | End: 2019-07-31

## 2019-07-31 DIAGNOSIS — C79.51 SECONDARY MALIGNANT NEOPLASM OF BONE: ICD-10-CM

## 2019-07-31 DIAGNOSIS — C34.90 MALIGNANT NEOPLASM OF UNSPECIFIED PART OF UNSPECIFIED BRONCHUS OR LUNG: ICD-10-CM

## 2019-07-31 DIAGNOSIS — N18.3 CHRONIC KIDNEY DISEASE, STAGE 3 (MODERATE): ICD-10-CM

## 2019-07-31 DIAGNOSIS — C32.0 MALIGNANT NEOPLASM OF GLOTTIS: Chronic | ICD-10-CM

## 2019-07-31 DIAGNOSIS — Z96.659 PRESENCE OF UNSPECIFIED ARTIFICIAL KNEE JOINT: Chronic | ICD-10-CM

## 2019-07-31 DIAGNOSIS — D63.1 ANEMIA IN CHRONIC KIDNEY DISEASE: ICD-10-CM

## 2019-07-31 DIAGNOSIS — Z90.49 ACQUIRED ABSENCE OF OTHER SPECIFIED PARTS OF DIGESTIVE TRACT: Chronic | ICD-10-CM

## 2019-08-06 ENCOUNTER — APPOINTMENT (OUTPATIENT)
Dept: HEMATOLOGY ONCOLOGY | Facility: CLINIC | Age: 79
End: 2019-08-06
Payer: MEDICARE

## 2019-08-06 ENCOUNTER — APPOINTMENT (OUTPATIENT)
Age: 79
End: 2019-08-06

## 2019-08-06 ENCOUNTER — RESULT REVIEW (OUTPATIENT)
Age: 79
End: 2019-08-06

## 2019-08-06 ENCOUNTER — LABORATORY RESULT (OUTPATIENT)
Age: 79
End: 2019-08-06

## 2019-08-06 LAB
BASOPHILS # BLD AUTO: 0.1 K/UL — SIGNIFICANT CHANGE UP (ref 0–0.2)
BASOPHILS NFR BLD AUTO: 0.6 % — SIGNIFICANT CHANGE UP (ref 0–2)
EOSINOPHIL # BLD AUTO: 0.3 K/UL — SIGNIFICANT CHANGE UP (ref 0–0.5)
EOSINOPHIL NFR BLD AUTO: 2.7 % — SIGNIFICANT CHANGE UP (ref 0–6)
HCT VFR BLD CALC: 27.6 % — LOW (ref 39–50)
HGB BLD-MCNC: 9.1 G/DL — LOW (ref 13–17)
LYMPHOCYTES # BLD AUTO: 1.2 K/UL — SIGNIFICANT CHANGE UP (ref 1–3.3)
LYMPHOCYTES # BLD AUTO: 12 % — LOW (ref 13–44)
MCHC RBC-ENTMCNC: 27.5 PG — SIGNIFICANT CHANGE UP (ref 27–34)
MCHC RBC-ENTMCNC: 33.1 G/DL — SIGNIFICANT CHANGE UP (ref 32–36)
MCV RBC AUTO: 82.9 FL — SIGNIFICANT CHANGE UP (ref 80–100)
MONOCYTES # BLD AUTO: 0.6 K/UL — SIGNIFICANT CHANGE UP (ref 0–0.9)
MONOCYTES NFR BLD AUTO: 6.3 % — SIGNIFICANT CHANGE UP (ref 2–14)
NEUTROPHILS # BLD AUTO: 7.7 K/UL — HIGH (ref 1.8–7.4)
NEUTROPHILS NFR BLD AUTO: 78.4 % — HIGH (ref 43–77)
PLATELET # BLD AUTO: 393 K/UL — SIGNIFICANT CHANGE UP (ref 150–400)
RBC # BLD: 3.33 M/UL — LOW (ref 4.2–5.8)
RBC # FLD: 16.4 % — HIGH (ref 10.3–14.5)
WBC # BLD: 9.8 K/UL — SIGNIFICANT CHANGE UP (ref 3.8–10.5)
WBC # FLD AUTO: 9.8 K/UL — SIGNIFICANT CHANGE UP (ref 3.8–10.5)

## 2019-08-06 PROCEDURE — 99213 OFFICE O/P EST LOW 20 MIN: CPT

## 2019-08-07 ENCOUNTER — APPOINTMENT (OUTPATIENT)
Dept: ORTHOPEDIC SURGERY | Facility: CLINIC | Age: 79
End: 2019-08-07
Payer: MEDICARE

## 2019-08-07 PROCEDURE — 73060 X-RAY EXAM OF HUMERUS: CPT | Mod: RT

## 2019-08-07 PROCEDURE — 99024 POSTOP FOLLOW-UP VISIT: CPT

## 2019-08-07 NOTE — HISTORY OF PRESENT ILLNESS
[de-identified] : Patient was seen today in followup several weeks post exploration resection intima fixation of a metastatic lesion involving the right proximal [facility] having localized pain and tenderness. [de-identified] : Physical exam reveals a fully alert oriented patient complaining of localized pain and tenderness over the right arm on exam there is some fullness and enlargement of the surrounding soft tissue over the proximal surgical wound he denies no swelling no discharge\par X-ray of the right humerus demonstrate a stable internal fixation and dysphagia patient and most likely related to a tumor pain patient was recommended to continue to be seen by the pain service for pain control

## 2019-08-08 ENCOUNTER — APPOINTMENT (OUTPATIENT)
Dept: PHYSICAL MEDICINE AND REHAB | Facility: CLINIC | Age: 79
End: 2019-08-08
Payer: MEDICARE

## 2019-08-08 DIAGNOSIS — M79.2 NEURALGIA AND NEURITIS, UNSPECIFIED: ICD-10-CM

## 2019-08-08 DIAGNOSIS — G89.3 NEOPLASM RELATED PAIN (ACUTE) (CHRONIC): ICD-10-CM

## 2019-08-08 DIAGNOSIS — C79.51 MALIGNANT NEOPLASM OF UNSPECIFIED PART OF UNSPECIFIED BRONCHUS OR LUNG: ICD-10-CM

## 2019-08-08 DIAGNOSIS — C34.90 MALIGNANT NEOPLASM OF UNSPECIFIED PART OF UNSPECIFIED BRONCHUS OR LUNG: ICD-10-CM

## 2019-08-08 PROCEDURE — 99214 OFFICE O/P EST MOD 30 MIN: CPT

## 2019-08-12 ENCOUNTER — APPOINTMENT (OUTPATIENT)
Dept: OPHTHALMOLOGY | Facility: CLINIC | Age: 79
End: 2019-08-12
Payer: MEDICARE

## 2019-08-12 ENCOUNTER — NON-APPOINTMENT (OUTPATIENT)
Age: 79
End: 2019-08-12

## 2019-08-12 PROCEDURE — 92014 COMPRE OPH EXAM EST PT 1/>: CPT

## 2019-08-12 PROCEDURE — 92020 GONIOSCOPY: CPT

## 2019-08-12 PROCEDURE — 92134 CPTRZ OPH DX IMG PST SGM RTA: CPT

## 2019-08-13 ENCOUNTER — MEDICATION RENEWAL (OUTPATIENT)
Age: 79
End: 2019-08-13

## 2019-08-13 ENCOUNTER — APPOINTMENT (OUTPATIENT)
Age: 79
End: 2019-08-13

## 2019-08-13 RX ORDER — FUROSEMIDE 20 MG/1
20 TABLET ORAL
Qty: 30 | Refills: 2 | Status: ACTIVE | COMMUNITY
Start: 2019-04-16 | End: 1900-01-01

## 2019-08-15 ENCOUNTER — RECORD ABSTRACTING (OUTPATIENT)
Age: 79
End: 2019-08-15

## 2019-08-15 VITALS — RESPIRATION RATE: 14 BRPM | HEART RATE: 80 BPM | WEIGHT: 193 LBS | BODY MASS INDEX: 27.63 KG/M2 | HEIGHT: 70 IN

## 2019-08-15 PROBLEM — G89.3 CANCER-RELATED PAIN: Status: ACTIVE | Noted: 2018-12-20

## 2019-08-15 PROBLEM — M79.2 NEUROPATHIC PAIN: Status: ACTIVE | Noted: 2019-05-20

## 2019-08-15 PROBLEM — C34.90 LUNG CANCER METASTATIC TO BONE: Status: ACTIVE | Noted: 2018-02-01

## 2019-08-15 NOTE — ASSESSMENT
[FreeTextEntry1] : Mr. UGARTE is a 79 year old man here for evaluation of right arm pain secondary metastatic bone lesion with likely related radial neuropathy, tumor expansion. There is associated swelling/color change which can be associated with sympathetic/vascular component. \par \par 1. Right arm pain, nociceptive: likely 2/2 metastatic lesion\par - consider RT\par 2. Neuropathic Pain: Continue gabapentin 800mg PO TID\par 3 Opioid Analgesia: Continue with fentanyl 75mg TD, oxycodone prn. Monitor for adverse effects. ISTOP # appropiate\par 4. Follow up with RT\par 5. Consider stim trial vs peripheral trial vs intrathecal\par 6. Follow up in 1 month.\par

## 2019-08-15 NOTE — HISTORY OF PRESENT ILLNESS
[FreeTextEntry1] : Mr. KARLI UGARTE is a 79 year old male here for follow up of right arm pain. He has history of lung Ca. The pain started at the right lateral upper arm, progressed over the past couple of months, found to have metastatic lesion with neuropathic pain over the arm and hand. He was seen by Dr. Richard, ultimately proceeded to RT. Since last visit, had surgical resection with fixation. Overall was doing well post-op until recently. He had followed up with Dr. Richard with XR demonstrating stable fixation. Radicular pain is slightly better.\par \par Location: right lateral arm radiating to his forearm and radial distribution of his hand\par Duration: months\par Inciting Event/Context: no specific inciting event or trauma\par Onset/Timing: constant\par Quality: burning, sharp\par Severity: 8/10\par Exacerbating factors: worse with certain times of the day, not related to activity\par Relieving factors: neuropathic pain medications\par Numbness/Tingling: over dorsum of the hand radial distribution\par Bowel/Bladder neurological incontinence: denies\par upper ext. weakness: clumsiness on the right hand\par \par Current Treatments:\par Rad: s/p 5,000cGy to the lung completed 3/16/17 and 800cGy to the right humerus completed 1/14/19 and re treatment right humerus 2000 cGy completed 4/16/19. \par 7/2/19: wide resection of metastatic lesion right humerus, internal fixation\par Medications: fentanyl 75mcg/hr TD, oxycodone - has some constipation, hallucinations\par Imaging: PET/CT appreciated

## 2019-08-21 ENCOUNTER — RESULT REVIEW (OUTPATIENT)
Age: 79
End: 2019-08-21

## 2019-08-21 ENCOUNTER — APPOINTMENT (OUTPATIENT)
Age: 79
End: 2019-08-21

## 2019-08-21 LAB
BASOPHILS # BLD AUTO: 0.1 K/UL — SIGNIFICANT CHANGE UP (ref 0–0.2)
BASOPHILS NFR BLD AUTO: 0.5 % — SIGNIFICANT CHANGE UP (ref 0–2)
EOSINOPHIL # BLD AUTO: 0.1 K/UL — SIGNIFICANT CHANGE UP (ref 0–0.5)
EOSINOPHIL NFR BLD AUTO: 0.8 % — SIGNIFICANT CHANGE UP (ref 0–6)
HCT VFR BLD CALC: 29.4 % — LOW (ref 39–50)
HGB BLD-MCNC: 9.4 G/DL — LOW (ref 13–17)
LYMPHOCYTES # BLD AUTO: 1.4 K/UL — SIGNIFICANT CHANGE UP (ref 1–3.3)
LYMPHOCYTES # BLD AUTO: 8.9 % — LOW (ref 13–44)
MCHC RBC-ENTMCNC: 26.8 PG — LOW (ref 27–34)
MCHC RBC-ENTMCNC: 31.8 G/DL — LOW (ref 32–36)
MCV RBC AUTO: 84 FL — SIGNIFICANT CHANGE UP (ref 80–100)
MONOCYTES # BLD AUTO: 0.8 K/UL — SIGNIFICANT CHANGE UP (ref 0–0.9)
MONOCYTES NFR BLD AUTO: 5.4 % — SIGNIFICANT CHANGE UP (ref 2–14)
NEUTROPHILS # BLD AUTO: 13 K/UL — HIGH (ref 1.8–7.4)
NEUTROPHILS NFR BLD AUTO: 84.3 % — HIGH (ref 43–77)
PLATELET # BLD AUTO: 392 K/UL — SIGNIFICANT CHANGE UP (ref 150–400)
RBC # BLD: 3.5 M/UL — LOW (ref 4.2–5.8)
RBC # FLD: 16.7 % — HIGH (ref 10.3–14.5)
WBC # BLD: 15.4 K/UL — HIGH (ref 3.8–10.5)
WBC # FLD AUTO: 15.4 K/UL — HIGH (ref 3.8–10.5)

## 2019-08-22 ENCOUNTER — RX RENEWAL (OUTPATIENT)
Age: 79
End: 2019-08-22

## 2019-08-22 ENCOUNTER — INPATIENT (INPATIENT)
Facility: HOSPITAL | Age: 79
LOS: 5 days | Discharge: NURSING, REHAB OR LONG-TERM CARE | DRG: 884 | End: 2019-08-28
Attending: HOSPITALIST | Admitting: INTERNAL MEDICINE
Payer: MEDICARE

## 2019-08-22 VITALS
WEIGHT: 190.04 LBS | SYSTOLIC BLOOD PRESSURE: 114 MMHG | HEIGHT: 70 IN | RESPIRATION RATE: 20 BRPM | DIASTOLIC BLOOD PRESSURE: 53 MMHG | HEART RATE: 90 BPM | OXYGEN SATURATION: 97 % | TEMPERATURE: 98 F

## 2019-08-22 DIAGNOSIS — Z90.49 ACQUIRED ABSENCE OF OTHER SPECIFIED PARTS OF DIGESTIVE TRACT: Chronic | ICD-10-CM

## 2019-08-22 DIAGNOSIS — Z96.659 PRESENCE OF UNSPECIFIED ARTIFICIAL KNEE JOINT: Chronic | ICD-10-CM

## 2019-08-22 DIAGNOSIS — G89.29 OTHER CHRONIC PAIN: ICD-10-CM

## 2019-08-22 DIAGNOSIS — C32.0 MALIGNANT NEOPLASM OF GLOTTIS: Chronic | ICD-10-CM

## 2019-08-22 LAB
ALBUMIN SERPL ELPH-MCNC: 2.5 G/DL — LOW (ref 3.3–5.2)
ALP SERPL-CCNC: 142 U/L — HIGH (ref 40–120)
ALT FLD-CCNC: 15 U/L — SIGNIFICANT CHANGE UP
ANION GAP SERPL CALC-SCNC: 15 MMOL/L — SIGNIFICANT CHANGE UP (ref 5–17)
APPEARANCE UR: CLEAR — SIGNIFICANT CHANGE UP
AST SERPL-CCNC: 18 U/L — SIGNIFICANT CHANGE UP
BASOPHILS # BLD AUTO: 0.02 K/UL — SIGNIFICANT CHANGE UP (ref 0–0.2)
BASOPHILS NFR BLD AUTO: 0.2 % — SIGNIFICANT CHANGE UP (ref 0–2)
BILIRUB SERPL-MCNC: 0.7 MG/DL — SIGNIFICANT CHANGE UP (ref 0.4–2)
BILIRUB UR-MCNC: NEGATIVE — SIGNIFICANT CHANGE UP
BUN SERPL-MCNC: 32 MG/DL — HIGH (ref 8–20)
CALCIUM SERPL-MCNC: 8.5 MG/DL — LOW (ref 8.6–10.2)
CHLORIDE SERPL-SCNC: 97 MMOL/L — LOW (ref 98–107)
CO2 SERPL-SCNC: 21 MMOL/L — LOW (ref 22–29)
COLOR SPEC: YELLOW — SIGNIFICANT CHANGE UP
CREAT SERPL-MCNC: 1.51 MG/DL — HIGH (ref 0.5–1.3)
DIFF PNL FLD: NEGATIVE — SIGNIFICANT CHANGE UP
EOSINOPHIL # BLD AUTO: 0.03 K/UL — SIGNIFICANT CHANGE UP (ref 0–0.5)
EOSINOPHIL NFR BLD AUTO: 0.2 % — SIGNIFICANT CHANGE UP (ref 0–6)
GLUCOSE BLDC GLUCOMTR-MCNC: 187 MG/DL — HIGH (ref 70–99)
GLUCOSE SERPL-MCNC: 113 MG/DL — SIGNIFICANT CHANGE UP (ref 70–115)
GLUCOSE UR QL: NEGATIVE MG/DL — SIGNIFICANT CHANGE UP
HCT VFR BLD CALC: 27.4 % — LOW (ref 39–50)
HGB BLD-MCNC: 8.2 G/DL — LOW (ref 13–17)
IMM GRANULOCYTES NFR BLD AUTO: 1 % — SIGNIFICANT CHANGE UP (ref 0–1.5)
KETONES UR-MCNC: NEGATIVE — SIGNIFICANT CHANGE UP
LEUKOCYTE ESTERASE UR-ACNC: NEGATIVE — SIGNIFICANT CHANGE UP
LYMPHOCYTES # BLD AUTO: 0.81 K/UL — LOW (ref 1–3.3)
LYMPHOCYTES # BLD AUTO: 6.4 % — LOW (ref 13–44)
MCHC RBC-ENTMCNC: 25.9 PG — LOW (ref 27–34)
MCHC RBC-ENTMCNC: 29.9 GM/DL — LOW (ref 32–36)
MCV RBC AUTO: 86.7 FL — SIGNIFICANT CHANGE UP (ref 80–100)
MONOCYTES # BLD AUTO: 0.75 K/UL — SIGNIFICANT CHANGE UP (ref 0–0.9)
MONOCYTES NFR BLD AUTO: 5.9 % — SIGNIFICANT CHANGE UP (ref 2–14)
NEUTROPHILS # BLD AUTO: 10.94 K/UL — HIGH (ref 1.8–7.4)
NEUTROPHILS NFR BLD AUTO: 86.3 % — HIGH (ref 43–77)
NITRITE UR-MCNC: NEGATIVE — SIGNIFICANT CHANGE UP
NT-PROBNP SERPL-SCNC: 1343 PG/ML — HIGH (ref 0–300)
PH UR: 5 — SIGNIFICANT CHANGE UP (ref 5–8)
PLATELET # BLD AUTO: 360 K/UL — SIGNIFICANT CHANGE UP (ref 150–400)
POTASSIUM SERPL-MCNC: 4.2 MMOL/L — SIGNIFICANT CHANGE UP (ref 3.5–5.3)
POTASSIUM SERPL-SCNC: 4.2 MMOL/L — SIGNIFICANT CHANGE UP (ref 3.5–5.3)
PROT SERPL-MCNC: 6.4 G/DL — LOW (ref 6.6–8.7)
PROT UR-MCNC: NEGATIVE MG/DL — SIGNIFICANT CHANGE UP
RBC # BLD: 3.16 M/UL — LOW (ref 4.2–5.8)
RBC # FLD: 17.8 % — HIGH (ref 10.3–14.5)
SODIUM SERPL-SCNC: 133 MMOL/L — LOW (ref 135–145)
SP GR SPEC: 1.01 — SIGNIFICANT CHANGE UP (ref 1.01–1.02)
TROPONIN T SERPL-MCNC: 0.04 NG/ML — SIGNIFICANT CHANGE UP (ref 0–0.06)
TROPONIN T SERPL-MCNC: 0.05 NG/ML — SIGNIFICANT CHANGE UP (ref 0–0.06)
UROBILINOGEN FLD QL: NEGATIVE MG/DL — SIGNIFICANT CHANGE UP
WBC # BLD: 12.68 K/UL — HIGH (ref 3.8–10.5)
WBC # FLD AUTO: 12.68 K/UL — HIGH (ref 3.8–10.5)

## 2019-08-22 PROCEDURE — 99218: CPT

## 2019-08-22 PROCEDURE — 71045 X-RAY EXAM CHEST 1 VIEW: CPT | Mod: 26

## 2019-08-22 PROCEDURE — 93970 EXTREMITY STUDY: CPT | Mod: 26

## 2019-08-22 PROCEDURE — 70450 CT HEAD/BRAIN W/O DYE: CPT | Mod: 26

## 2019-08-22 RX ORDER — SODIUM CHLORIDE 9 MG/ML
1000 INJECTION, SOLUTION INTRAVENOUS ONCE
Refills: 0 | Status: COMPLETED | OUTPATIENT
Start: 2019-08-22 | End: 2019-08-22

## 2019-08-22 RX ORDER — SODIUM CHLORIDE 9 MG/ML
1000 INJECTION INTRAMUSCULAR; INTRAVENOUS; SUBCUTANEOUS ONCE
Refills: 0 | Status: COMPLETED | OUTPATIENT
Start: 2019-08-22 | End: 2019-08-22

## 2019-08-22 RX ORDER — GLUCAGON INJECTION, SOLUTION 0.5 MG/.1ML
1 INJECTION, SOLUTION SUBCUTANEOUS ONCE
Refills: 0 | Status: DISCONTINUED | OUTPATIENT
Start: 2019-08-22 | End: 2019-08-28

## 2019-08-22 RX ORDER — DEXTROSE 50 % IN WATER 50 %
12.5 SYRINGE (ML) INTRAVENOUS ONCE
Refills: 0 | Status: DISCONTINUED | OUTPATIENT
Start: 2019-08-22 | End: 2019-08-28

## 2019-08-22 RX ORDER — DEXTROSE 50 % IN WATER 50 %
25 SYRINGE (ML) INTRAVENOUS ONCE
Refills: 0 | Status: DISCONTINUED | OUTPATIENT
Start: 2019-08-22 | End: 2019-08-28

## 2019-08-22 RX ORDER — DEXTROSE 50 % IN WATER 50 %
15 SYRINGE (ML) INTRAVENOUS ONCE
Refills: 0 | Status: DISCONTINUED | OUTPATIENT
Start: 2019-08-22 | End: 2019-08-28

## 2019-08-22 RX ORDER — FLUOXETINE HCL 10 MG
20 CAPSULE ORAL DAILY
Refills: 0 | Status: DISCONTINUED | OUTPATIENT
Start: 2019-08-22 | End: 2019-08-28

## 2019-08-22 RX ORDER — PROCHLORPERAZINE MALEATE 5 MG
10 TABLET ORAL EVERY 8 HOURS
Refills: 0 | Status: DISCONTINUED | OUTPATIENT
Start: 2019-08-22 | End: 2019-08-28

## 2019-08-22 RX ORDER — OXYCODONE HYDROCHLORIDE 5 MG/1
10 TABLET ORAL EVERY 6 HOURS
Refills: 0 | Status: DISCONTINUED | OUTPATIENT
Start: 2019-08-22 | End: 2019-08-23

## 2019-08-22 RX ORDER — ALPRAZOLAM 0.25 MG
0.5 TABLET ORAL THREE TIMES A DAY
Refills: 0 | Status: DISCONTINUED | OUTPATIENT
Start: 2019-08-22 | End: 2019-08-28

## 2019-08-22 RX ORDER — GABAPENTIN 400 MG/1
800 CAPSULE ORAL THREE TIMES A DAY
Refills: 0 | Status: DISCONTINUED | OUTPATIENT
Start: 2019-08-22 | End: 2019-08-28

## 2019-08-22 RX ORDER — CALCITRIOL 0.5 UG/1
0.25 CAPSULE ORAL DAILY
Refills: 0 | Status: DISCONTINUED | OUTPATIENT
Start: 2019-08-22 | End: 2019-08-28

## 2019-08-22 RX ORDER — TAMSULOSIN HYDROCHLORIDE 0.4 MG/1
0.4 CAPSULE ORAL AT BEDTIME
Refills: 0 | Status: DISCONTINUED | OUTPATIENT
Start: 2019-08-22 | End: 2019-08-28

## 2019-08-22 RX ORDER — INSULIN GLARGINE 100 [IU]/ML
30 INJECTION, SOLUTION SUBCUTANEOUS AT BEDTIME
Refills: 0 | Status: DISCONTINUED | OUTPATIENT
Start: 2019-08-22 | End: 2019-08-25

## 2019-08-22 RX ORDER — FENTANYL CITRATE 50 UG/ML
1 INJECTION INTRAVENOUS
Refills: 0 | Status: DISCONTINUED | OUTPATIENT
Start: 2019-08-22 | End: 2019-08-28

## 2019-08-22 RX ORDER — INSULIN LISPRO 100/ML
VIAL (ML) SUBCUTANEOUS
Refills: 0 | Status: DISCONTINUED | OUTPATIENT
Start: 2019-08-22 | End: 2019-08-28

## 2019-08-22 RX ORDER — OXYCODONE 5 MG/1
5 TABLET ORAL
Qty: 80 | Refills: 0 | Status: ACTIVE | COMMUNITY
Start: 2019-07-15 | End: 1900-01-01

## 2019-08-22 RX ORDER — FERROUS SULFATE 325(65) MG
325 TABLET ORAL
Refills: 0 | Status: DISCONTINUED | OUTPATIENT
Start: 2019-08-22 | End: 2019-08-28

## 2019-08-22 RX ORDER — FUROSEMIDE 40 MG
20 TABLET ORAL DAILY
Refills: 0 | Status: DISCONTINUED | OUTPATIENT
Start: 2019-08-22 | End: 2019-08-28

## 2019-08-22 RX ORDER — LEVOTHYROXINE SODIUM 125 MCG
125 TABLET ORAL DAILY
Refills: 0 | Status: DISCONTINUED | OUTPATIENT
Start: 2019-08-22 | End: 2019-08-28

## 2019-08-22 RX ORDER — SODIUM CHLORIDE 9 MG/ML
1000 INJECTION, SOLUTION INTRAVENOUS
Refills: 0 | Status: DISCONTINUED | OUTPATIENT
Start: 2019-08-22 | End: 2019-08-28

## 2019-08-22 RX ORDER — SIMVASTATIN 20 MG/1
20 TABLET, FILM COATED ORAL AT BEDTIME
Refills: 0 | Status: DISCONTINUED | OUTPATIENT
Start: 2019-08-22 | End: 2019-08-28

## 2019-08-22 RX ORDER — ALBUTEROL 90 UG/1
2 AEROSOL, METERED ORAL EVERY 6 HOURS
Refills: 0 | Status: DISCONTINUED | OUTPATIENT
Start: 2019-08-22 | End: 2019-08-28

## 2019-08-22 RX ORDER — AMLODIPINE BESYLATE 2.5 MG/1
5 TABLET ORAL DAILY
Refills: 0 | Status: DISCONTINUED | OUTPATIENT
Start: 2019-08-22 | End: 2019-08-23

## 2019-08-22 RX ADMIN — OXYCODONE HYDROCHLORIDE 10 MILLIGRAM(S): 5 TABLET ORAL at 18:17

## 2019-08-22 RX ADMIN — Medication 325 MILLIGRAM(S): at 21:24

## 2019-08-22 RX ADMIN — INSULIN GLARGINE 30 UNIT(S): 100 INJECTION, SOLUTION SUBCUTANEOUS at 21:25

## 2019-08-22 RX ADMIN — Medication 0.5 MILLIGRAM(S): at 21:24

## 2019-08-22 RX ADMIN — TAMSULOSIN HYDROCHLORIDE 0.4 MILLIGRAM(S): 0.4 CAPSULE ORAL at 21:24

## 2019-08-22 RX ADMIN — SODIUM CHLORIDE 250 MILLILITER(S): 9 INJECTION, SOLUTION INTRAVENOUS at 17:26

## 2019-08-22 RX ADMIN — FENTANYL CITRATE 1 PATCH: 50 INJECTION INTRAVENOUS at 23:30

## 2019-08-22 RX ADMIN — SODIUM CHLORIDE 500 MILLILITER(S): 9 INJECTION INTRAMUSCULAR; INTRAVENOUS; SUBCUTANEOUS at 13:15

## 2019-08-22 RX ADMIN — SIMVASTATIN 20 MILLIGRAM(S): 20 TABLET, FILM COATED ORAL at 21:23

## 2019-08-22 RX ADMIN — SODIUM CHLORIDE 1000 MILLILITER(S): 9 INJECTION, SOLUTION INTRAVENOUS at 22:03

## 2019-08-22 RX ADMIN — GABAPENTIN 800 MILLIGRAM(S): 400 CAPSULE ORAL at 21:24

## 2019-08-22 NOTE — PROVIDER CONTACT NOTE (OTHER) - RECOMMENDATIONS
Pt will benefit from Acute vs MANUEL. If pt opts for home, will need 24/7 Assist/PT, (Aide coverage?) Pt not amenable to MANUEL spouse states she was injured trying to assist. Spouse to speak w pt re: rehab

## 2019-08-22 NOTE — ED CDU PROVIDER INITIAL DAY NOTE - PROGRESS NOTE DETAILS
Sign out Rispoli generalized weakness x 5 days hx of lung cancer and mets to bone. pending US bilateral and MR head  and repeat trop

## 2019-08-22 NOTE — ED ADULT NURSE NOTE - OBJECTIVE STATEMENT
Pt awake, alert and oriented x3 c/o  right arm pain and difficulty walking.  Pts wife states he has lung ca mets to bone.  Had surgery to right arm to remove tumor Aug 2, increasing pain since surgery.  Fentanyl patch 75 mcg to left deltoid.  Took 2 5mg oxycodones immediately prior to coming to ED with no relief.  Wife concerned because pt can normally ambulate well independently but now is unable to walk without assistance.  respirations even and unlabored, denies chest pain or shortness of breath.  Sinus rhythm on cardiac monitor.  skin warm and dry.  Sling to right arm upon arrival.

## 2019-08-22 NOTE — PHYSICAL THERAPY INITIAL EVALUATION ADULT - ADDITIONAL COMMENTS
Pt lives in a private home with his wife. 2-3 steps to enter with handrails, 1 small steps inside without handrails to bathroom. Pt was independent up to 2 weeks ago without assist device. Pt owns SAC only, rarely uses it per spouse and has been sponge bathing for 2 weeks 2*2 unsafe to use shower.

## 2019-08-22 NOTE — PHYSICAL THERAPY INITIAL EVALUATION ADULT - THERAPY FREQUENCY, PT EVAL
Future Appointments  Date Time Provider Romi Jose   7/6/2017 2:15 PM Michelle Garcia MD [facility] [place], 06/21/2017, 1:02 PM 3-5x/week

## 2019-08-22 NOTE — ED ADULT NURSE NOTE - INTERVENTIONS DEFINITIONS
Call bell, personal items and telephone within reach/Instruct patient to call for assistance/[place] to call system [place] to call system/Instruct patient to call for assistance/Monitor gait and stability/Call bell, personal items and telephone within reach/Room bathroom lighting operational

## 2019-08-22 NOTE — PROVIDER CONTACT NOTE (OTHER) - BACKGROUND
77 yo M PMH HTN, hyperlipidemia, hypothyroidism, COPD/Empysema, CKD, DM2, vocal cord CA s/p RT 1996, lung CA c chemo/RT mets to right humerus confirmed by PET/CT, s/p mass removal and orif 7/19 at [facility]

## 2019-08-22 NOTE — ED PROVIDER NOTE - CLINICAL SUMMARY MEDICAL DECISION MAKING FREE TEXT BOX
several days weakness/off balance, no other focal neuro deficits. otherwise well, chronic pain from cancer. will check ct head, urine, basic labs/ekg, PT consult. possible mri for subacute stroke outside of any window.

## 2019-08-22 NOTE — ED CDU PROVIDER INITIAL DAY NOTE - OBJECTIVE STATEMENT
79 yo male with PMH hypertension, hyperlipidemia, hypothyroidism, COPD/Empysema, CKD, DM2, vocal cord CA s/p RT 1996, lung cancer s/p chemo and RT, lung cancer metastasized to the right humerus confirmed by PET/CT, s/p mass removal and orif in early july at [facility], c/o several days of waxing and waning off balance/generalized weakness b/l LE. Denies any acute change. Pt had a fall onto left arm 1 week ago, without significant pain to that arm (the good arm). No other focal deficits. Denies a/c, no headache/neck pain, cp, sob, abd pain. Pt ambulates without cane/walker due to right arm pain since surgery. Pt on narcotics for chronic bone pain from cancer.   Labs reviewed, seen by PT recommends MANUEL, Ct head obtained, MRI and US doppler LE pending.

## 2019-08-22 NOTE — ED CDU PROVIDER INITIAL DAY NOTE - ATTENDING CONTRIBUTION TO CARE
I, Alfredito Irby, have personally performed a face to face diagnostic evaluation on this patient. I have reviewed the ACP note and agree with the history, exam and plan of care, except as noted.    77 yo F hx of hypertension, hyperlipidemia, hypothyroidism, COPD/Empysema, CKD, DM2, vocal cord CA s/p RT 1996, lung cancer s/p chemo and RT, lung cancer metastasized to the right humerus confirmed by PET/CT, s/p mass removal and orif p/w difficulty walking and frequent fall. Patient seen by PT will need rehab. Patient placed in Obs pending SW for placement.

## 2019-08-22 NOTE — PHYSICAL THERAPY INITIAL EVALUATION ADULT - TRANSFER SAFETY CONCERNS NOTED: SIT/STAND, REHAB EVAL
decreased balance during turns/decreased sequencing ability/decreased proprioception/decreased weight-shifting ability

## 2019-08-22 NOTE — ED PROVIDER NOTE - OBJECTIVE STATEMENT
77 yo male with PMH hypertension, hyperlipidemia, hypothyroidism, COPD/Empysema, CKD, DM2, vocal cord CA s/p RT 1996, lung cancer s/p chemo and RT, lung cancer metastasized to the right humerus confirmed by PET/CT, s/p mass removal and orif in early july at [facility], c/o several days of waxing and waning off balance/generalized weakness b/l LE. Denies any acute change. Pt had a fall onto left arm 1 week ago, without signfiicant pain to that arm (the good arm). No other focal deficits. Denies a/c, no headache/neck pain, cp, sob, abd pain. Pt ambulates without cane/walker due to right arm pain since surgery. Pt on narcotics for chronic bone pain from cancer.     ROS: No fever/chills. No eye pain/changes in vision, No ear pain/sore throat/dysphagia, No chest pain/palpitations. No SOB/cough/. No abdominal pain, N/V/D, no black/bloody bm. No dysuria/frequency/discharge, No headache. No Dizziness.    No rashes or breaks in skin.

## 2019-08-22 NOTE — PHYSICAL THERAPY INITIAL EVALUATION ADULT - PERTINENT HX OF CURRENT PROBLEM, REHAB EVAL
Per ED provider: 77 yo male with PMH hypertension, hyperlipidemia, hypothyroidism, COPD/Empysema, CKD, DM2, vocal cord CA s/p RT 1996, lung cancer s/p chemo and RT, lung cancer metastasized to the right humerus confirmed by PET/CT, s/p mass removal and orif in early july at [facility], c/o several days of waxing and waning off balance/generalized weakness b/l LE.

## 2019-08-22 NOTE — ED PROVIDER NOTE - PROGRESS NOTE DETAILS
Yimi: pt labs wnl, seen by pt, needs rehab. placed in obs for mr brain and for case management assistance with rehab. Dr. Irby and Katty maxwell.

## 2019-08-22 NOTE — ED CDU PROVIDER INITIAL DAY NOTE - MEDICAL DECISION MAKING DETAILS
80 y/o M with lung ca metastasized to the bone  presents with progressive generalized weakness and inability to ambulate over the past 5 days.  PT recommends rehab.  Will gently hydrate, serial troponin, mri and dopplers pending.

## 2019-08-22 NOTE — ED PROVIDER NOTE - PHYSICAL EXAMINATION
Gen: comfortably laying, right arm in sling.   HEENT: Mucous membranes moist, pink conjunctivae, EOMI  CV: RRR, nl s1/s2.  Resp: CTAB, normal rate and effort  GI: Abdomen soft, NT, ND. No rebound, no guarding  : No CVAT  Neuro: A&O x 3, moving all 4 extremities, nl strength b/l LE and LUE, chronic pain with rue limits evaluation. nl speech, cn 2-12 wnl. nl finger to nose on left  MSK: No spine or joint tenderness to palpation. right arm in sling, no erythema/warmth/swollen compartment, chronic pain limiting movement. scab to left forearm/elbow without significant pain/swelling and with nl rom.   Skin: No rashes. intact and perfused.

## 2019-08-22 NOTE — CHART NOTE - NSCHARTNOTEFT_GEN_A_CORE
Per PT eval Pt will need [facility] faxed clinicals to [facility], KANE circulated to SNF's in network. SW will continue to follow

## 2019-08-22 NOTE — ED ADULT TRIAGE NOTE - CHIEF COMPLAINT QUOTE
Patient reports generalized weakness, difficulty walking and right arm pain that's progressively getting worse since Saturday.  Patient on a lot of pain medications from surgery.

## 2019-08-23 DIAGNOSIS — R53.1 WEAKNESS: ICD-10-CM

## 2019-08-23 LAB
ANION GAP SERPL CALC-SCNC: 12 MMOL/L — SIGNIFICANT CHANGE UP (ref 5–17)
BUN SERPL-MCNC: 26 MG/DL — HIGH (ref 8–20)
CALCIUM SERPL-MCNC: 7.8 MG/DL — LOW (ref 8.6–10.2)
CHLORIDE SERPL-SCNC: 96 MMOL/L — LOW (ref 98–107)
CO2 SERPL-SCNC: 25 MMOL/L — SIGNIFICANT CHANGE UP (ref 22–29)
CREAT SERPL-MCNC: 1.23 MG/DL — SIGNIFICANT CHANGE UP (ref 0.5–1.3)
GLUCOSE BLDC GLUCOMTR-MCNC: 117 MG/DL — HIGH (ref 70–99)
GLUCOSE BLDC GLUCOMTR-MCNC: 145 MG/DL — HIGH (ref 70–99)
GLUCOSE BLDC GLUCOMTR-MCNC: 177 MG/DL — HIGH (ref 70–99)
GLUCOSE BLDC GLUCOMTR-MCNC: 244 MG/DL — HIGH (ref 70–99)
GLUCOSE SERPL-MCNC: 149 MG/DL — HIGH (ref 70–115)
HCT VFR BLD CALC: 27.8 % — LOW (ref 39–50)
HGB BLD-MCNC: 8.5 G/DL — LOW (ref 13–17)
MCHC RBC-ENTMCNC: 26.6 PG — LOW (ref 27–34)
MCHC RBC-ENTMCNC: 30.6 GM/DL — LOW (ref 32–36)
MCV RBC AUTO: 86.9 FL — SIGNIFICANT CHANGE UP (ref 80–100)
PLATELET # BLD AUTO: 338 K/UL — SIGNIFICANT CHANGE UP (ref 150–400)
POTASSIUM SERPL-MCNC: 4.2 MMOL/L — SIGNIFICANT CHANGE UP (ref 3.5–5.3)
POTASSIUM SERPL-SCNC: 4.2 MMOL/L — SIGNIFICANT CHANGE UP (ref 3.5–5.3)
RBC # BLD: 3.2 M/UL — LOW (ref 4.2–5.8)
RBC # FLD: 17.8 % — HIGH (ref 10.3–14.5)
SODIUM SERPL-SCNC: 133 MMOL/L — LOW (ref 135–145)
TROPONIN T SERPL-MCNC: 0.03 NG/ML — SIGNIFICANT CHANGE UP (ref 0–0.06)
WBC # BLD: 11.74 K/UL — HIGH (ref 3.8–10.5)
WBC # FLD AUTO: 11.74 K/UL — HIGH (ref 3.8–10.5)

## 2019-08-23 PROCEDURE — 99217: CPT

## 2019-08-23 PROCEDURE — 99223 1ST HOSP IP/OBS HIGH 75: CPT

## 2019-08-23 PROCEDURE — 71275 CT ANGIOGRAPHY CHEST: CPT | Mod: 26

## 2019-08-23 RX ORDER — OXYCODONE HYDROCHLORIDE 5 MG/1
10 TABLET ORAL EVERY 4 HOURS
Refills: 0 | Status: DISCONTINUED | OUTPATIENT
Start: 2019-08-23 | End: 2019-08-28

## 2019-08-23 RX ORDER — HEPARIN SODIUM 5000 [USP'U]/ML
5000 INJECTION INTRAVENOUS; SUBCUTANEOUS EVERY 8 HOURS
Refills: 0 | Status: DISCONTINUED | OUTPATIENT
Start: 2019-08-23 | End: 2019-08-28

## 2019-08-23 RX ORDER — VERAPAMIL HCL 240 MG
40 CAPSULE, EXTENDED RELEASE PELLETS 24 HR ORAL DAILY
Refills: 0 | Status: DISCONTINUED | OUTPATIENT
Start: 2019-08-23 | End: 2019-08-28

## 2019-08-23 RX ORDER — IPRATROPIUM/ALBUTEROL SULFATE 18-103MCG
3 AEROSOL WITH ADAPTER (GRAM) INHALATION EVERY 4 HOURS
Refills: 0 | Status: DISCONTINUED | OUTPATIENT
Start: 2019-08-23 | End: 2019-08-28

## 2019-08-23 RX ORDER — OXYCODONE HYDROCHLORIDE 5 MG/1
5 TABLET ORAL EVERY 4 HOURS
Refills: 0 | Status: DISCONTINUED | OUTPATIENT
Start: 2019-08-23 | End: 2019-08-28

## 2019-08-23 RX ADMIN — OXYCODONE HYDROCHLORIDE 10 MILLIGRAM(S): 5 TABLET ORAL at 06:15

## 2019-08-23 RX ADMIN — GABAPENTIN 800 MILLIGRAM(S): 400 CAPSULE ORAL at 22:31

## 2019-08-23 RX ADMIN — HEPARIN SODIUM 5000 UNIT(S): 5000 INJECTION INTRAVENOUS; SUBCUTANEOUS at 22:31

## 2019-08-23 RX ADMIN — FENTANYL CITRATE 1 PATCH: 50 INJECTION INTRAVENOUS at 21:07

## 2019-08-23 RX ADMIN — SIMVASTATIN 20 MILLIGRAM(S): 20 TABLET, FILM COATED ORAL at 22:31

## 2019-08-23 RX ADMIN — GABAPENTIN 800 MILLIGRAM(S): 400 CAPSULE ORAL at 06:15

## 2019-08-23 RX ADMIN — OXYCODONE HYDROCHLORIDE 10 MILLIGRAM(S): 5 TABLET ORAL at 07:30

## 2019-08-23 RX ADMIN — FENTANYL CITRATE 1 PATCH: 50 INJECTION INTRAVENOUS at 07:30

## 2019-08-23 RX ADMIN — OXYCODONE HYDROCHLORIDE 10 MILLIGRAM(S): 5 TABLET ORAL at 01:30

## 2019-08-23 RX ADMIN — Medication 325 MILLIGRAM(S): at 09:24

## 2019-08-23 RX ADMIN — Medication 20 MILLIGRAM(S): at 06:15

## 2019-08-23 RX ADMIN — Medication 325 MILLIGRAM(S): at 16:09

## 2019-08-23 RX ADMIN — CALCITRIOL 0.25 MICROGRAM(S): 0.5 CAPSULE ORAL at 12:20

## 2019-08-23 RX ADMIN — OXYCODONE HYDROCHLORIDE 10 MILLIGRAM(S): 5 TABLET ORAL at 12:20

## 2019-08-23 RX ADMIN — Medication 2: at 12:20

## 2019-08-23 RX ADMIN — GABAPENTIN 800 MILLIGRAM(S): 400 CAPSULE ORAL at 16:03

## 2019-08-23 RX ADMIN — OXYCODONE HYDROCHLORIDE 10 MILLIGRAM(S): 5 TABLET ORAL at 00:30

## 2019-08-23 RX ADMIN — Medication 20 MILLIGRAM(S): at 12:20

## 2019-08-23 RX ADMIN — TAMSULOSIN HYDROCHLORIDE 0.4 MILLIGRAM(S): 0.4 CAPSULE ORAL at 22:31

## 2019-08-23 RX ADMIN — AMLODIPINE BESYLATE 5 MILLIGRAM(S): 2.5 TABLET ORAL at 06:15

## 2019-08-23 RX ADMIN — OXYCODONE HYDROCHLORIDE 10 MILLIGRAM(S): 5 TABLET ORAL at 19:03

## 2019-08-23 RX ADMIN — Medication 5 MILLIGRAM(S): at 12:20

## 2019-08-23 RX ADMIN — Medication 125 MICROGRAM(S): at 06:15

## 2019-08-23 RX ADMIN — INSULIN GLARGINE 30 UNIT(S): 100 INJECTION, SOLUTION SUBCUTANEOUS at 22:51

## 2019-08-23 RX ADMIN — Medication 0.5 MILLIGRAM(S): at 17:10

## 2019-08-23 NOTE — H&P ADULT - ASSESSMENT
78M with PMH of hypertension, hyperlipidemia, hypothyroidism, COPD/ Empysema, CKD, DM2, vocal cord CA s/p RT 1996, lung cancer s/p chemo and RT, lung cancer metastasized to the right humerus s/p    > Debility / Unable to Ambulate - PT evaluation.  Likely due to above.  Pt receptive to MANUEL placement if required. MR brain pending.   > Hypoxia / COPD / Emphysema - unclear baseline SaO2.  Start on O2 via nc.  Duonebs prn.  In setting of immobility and atrial tachycardia, would consider thromboembolic disease.  Agree with CTA Chest.  > Hyperlipidemia - diet modification.  Continue Statin.  > Essential HTN - Monitor BP.  Continue oral antihypertensives.  > Acquired Hypothyroidism - continue Synthroid.  > Diabetes Type II - monitor fingersticks.  Insulin coverage for hyperglycemia.  > Anemia - monitor H/H.  Check stool OB, Fe panel.  > DVT Prophylaxis - Heparin subcut.

## 2019-08-23 NOTE — CONSULT NOTE ADULT - SUBJECTIVE AND OBJECTIVE BOX
[facility]                                                    [address] [facility], [address], [place] [address]                                                         PHONE: [phone]    FAX: [phone] [address] [facility] [address] [facility], [address], [place] [address]                                                 PHONE: [phone]    FAX: [phone]  *******************************************************************************    Reason for Consult: Atrial tachycardia     HPI:  KARLI UGARTE is a 79y Male with PMH of hypertension, hyperlipidemia, hypothyroidism, COPD/ Empysema, CKD, DM2, vocal cord CA s/p RT 1996, lung cancer s/p chemo and RT, lung cancer metastasized to the right humerus s/p ORIF of right humerus on 7/2/19 presents c/o generalized fatigue and unable to ambulate x several weeks.  Symptoms progressively worsening.  No focal weakness / numbness.  States he has been unable to mobilize with wife at home.  Noted hypoxic with SaO2 89% on RA.  Episodic atrial tachycardia in ED.  Cardiology consult was called.      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 1000 milliLiter(s) (50 mL/Hr) IV Continuous <Continuous>  dextrose 50% Injectable 12.5 Gram(s) IV Push once  dextrose 50% Injectable 25 Gram(s) IV Push once  dextrose 50% Injectable 25 Gram(s) IV Push once  fentaNYL   Patch  75 MICROgram(s)/Hr. 1 Patch Transdermal every 48 hours  ferrous sulfate Oral Tab/Cap - Peds 325 milliGRAM(s) Oral two times a day with meals  FLUoxetine 20 milliGRAM(s) Oral daily  furosemide    Tablet 20 milliGRAM(s) Oral daily  gabapentin 800 milliGRAM(s) Oral three times a day  heparin  Injectable 5000 Unit(s) SubCutaneous every 8 hours  insulin glargine Injectable (LANTUS) 30 Unit(s) SubCutaneous at bedtime  insulin lispro (HumaLOG) corrective regimen sliding scale   SubCutaneous three times a day before meals  levothyroxine 125 MICROGram(s) Oral daily  simvastatin 20 milliGRAM(s) Oral at bedtime  tamsulosin 0.4 milliGRAM(s) Oral at bedtime    MEDICATIONS  (PRN):  ALBUTerol    90 MICROgram(s) HFA Inhaler 2 Puff(s) Inhalation every 6 hours PRN Wheezing  ALBUTerol/ipratropium for Nebulization 3 milliLiter(s) Nebulizer every 4 hours PRN Shortness of Breath and/or Wheezing  ALPRAZolam 0.5 milliGRAM(s) Oral three times a day PRN anxiety  dextrose 40% Gel 15 Gram(s) Oral once PRN Blood Glucose LESS THAN 70 milliGRAM(s)/deciliter  glucagon  Injectable 1 milliGRAM(s) IntraMuscular once PRN Glucose LESS THAN 70 milligrams/deciliter  oxyCODONE    IR 5 milliGRAM(s) Oral every 4 hours PRN mild - moderate pain  oxyCODONE    IR 10 milliGRAM(s) Oral every 4 hours PRN Severe Pain (7 - 10)  prochlorperazine   Tablet 10 milliGRAM(s) Oral every 8 hours PRN nausea      Social History: no active tobacco / EtOH / IVDA    Family History: Family history of esophageal cancer  Family history of liver disease  No pertinent family history in first degree relatives      ROS: As noted above, otherwise unremarkable.    Vital Signs Last 24 Hrs  T(C): 37.2 (23 Aug 2019 11:10), Max: 37.6 (22 Aug 2019 23:15)  T(F): 99 (23 Aug 2019 11:10), Max: 99.6 (22 Aug 2019 23:15)  HR: 80 (23 Aug 2019 11:10) (76 - 89)  BP: 114/69 (23 Aug 2019 11:10) (97/58 - 129/62)  BP(mean): --  RR: 18 (23 Aug 2019 11:10) (18 - 20)  SpO2: 97% (23 Aug 2019 11:10) (95% - 98%)    I&O's Detail    22 Aug 2019 07:01  -  23 Aug 2019 07:00  --------------------------------------------------------  IN:  Total IN: 0 mL    OUT:    Voided: 1000 mL  Total OUT: 1000 mL    Total NET: -1000 mL      23 Aug 2019 07:01  -  23 Aug 2019 17:51  --------------------------------------------------------  IN:  Total IN: 0 mL    OUT:    Voided: 675 mL  Total OUT: 675 mL    Total NET: -675 mL        I&O's Summary    22 Aug 2019 07:01  -  23 Aug 2019 07:00  --------------------------------------------------------  IN: 0 mL / OUT: 1000 mL / NET: -1000 mL    23 Aug 2019 07:01  -  23 Aug 2019 17:51  --------------------------------------------------------  IN: 0 mL / OUT: 675 mL / NET: -675 mL            PHYSICAL EXAM:  General: Appears well developed, well nourished, no acute distress  HEENT: Head: normocephalic, atraumatic  Eyes: Pupils equal and reactive  Neck: Supple, no carotid bruit, no JVD, no HJR  CARDIOVASCULAR: Normal S1 and S2, no murmur, rub, or gallop  LUNGS: Clear to auscultation bilaterally, no rales, rhonchi or wheeze  ABDOMEN: Soft, nontender, non-distended, positive bowel sounds, no mass or bruit  EXTREMITIES: b/l LE trace edema, distal pulses WNL  SKIN: Warm and dry with normal turgor  NEURO: Alert & oriented x 3, grossly intact  PSYCH: normal mood and affect    LABS:                        8.2    12.68 )-----------( 360      ( 22 Aug 2019 12:51 )             27.4     08-22    133<L>  |  97<L>  |  32.0<H>  ----------------------------<  113  4.2   |  21.0<L>  |  1.51<H>    Ca    8.5<L>      22 Aug 2019 12:51    TPro  6.4<L>  /  Alb  2.5<L>  /  TBili  0.7  /  DBili  x   /  AST  18  /  ALT  15  /  AlkPhos  142<H>  08-22    CARDIAC MARKERS ( 23 Aug 2019 01:26 )  x     / 0.03 ng/mL / x     / x     / x      CARDIAC MARKERS ( 22 Aug 2019 19:21 )  x     / 0.04 ng/mL / x     / x     / x      CARDIAC MARKERS ( 22 Aug 2019 12:51 )  x     / 0.05 ng/mL / x     / x     / x                RADIOLOGY & ADDITIONAL STUDIES:    ECG:    ECHO:    STRESS TEST:    CARDIAC CATHETERIZATION:    Assessment and Plan:  In summary, KARLI UGARTE is a 79y Male with past medical history significant for HTN, HL, hypothyroidism, COPD/ Empysema, CKD, DM2, vocal cord CA s/p RT 1996, lung cancer s/p chemo and RT, lung cancer metastasized to the right humerus s/p ORIF of right humerus on 7/2/19 presents c/o generalized fatigue and unable to ambulate x several weeks.  Symptoms progressively worsening.  Noted hypoxic with SaO2 89% on RA.  Episodic atrial tachycardia in ED. Currently maintains normal sinus rhythm.       - Monitor on telemetry  - No evidence of ischemia or CHF clinically  - Rhythm/hemodynamics stable   - Keep K > 4, Mg > 2  - Will change amlodipine to verapamil 40 mg daily to control atrial arrhythmias, can increase to twice daily if clinically necessary.    - No further diagnostic intervention is necessary from cardiac stand point  - Consider palliative care        - Will follow PRN, call us any time if any questions      Thank you for allowing me to participate in the care of your patient.      Sincerely,

## 2019-08-23 NOTE — ED CDU PROVIDER SUBSEQUENT DAY NOTE - HISTORY
80 yo male generalized weakness x 5 days with frequent falls at home. lung ca with mets to bone. pending MR head and placement for subacute rehab.

## 2019-08-23 NOTE — H&P ADULT - NSHPPHYSICALEXAM_GEN_ALL_CORE
PHYSICAL EXAMINATION:  GENERAL: NAD, Alert and Oriented x 3 HEENT:  Normocephalic and atraumatic.  Extraocular muscles are intact.  NECK: Supple.  - JVD.  CARDIOVASCULAR: RRR S1, S2.  LUNGS: CTAB, - rales, - wheezing, - rhonchi.  BACK: - CVA tenderness.  ABDOMEN: Soft, - tenderness, - distension, + BS.  EXTREMITIES: - cyanosis, - clubbing.  + RLE edema  NEUROLOGIC: strength is symmetric, sensation intact, speech fluent.  PSYCHIATRIC: Calm.  - agitation.    SKIN: - rashes, - lesions.

## 2019-08-23 NOTE — ED CDU PROVIDER DISPOSITION NOTE - CLINICAL COURSE
78 y/o M with pmh of lung ca with mets to bone c/o progressively worsening generalized weakness x 4 days.  Pt found is 86% room air, with episodes of atrial tachycardia - worsening CA vs PE - will obtain CTA, cards consult ([facility] - already called)

## 2019-08-23 NOTE — H&P ADULT - HISTORY OF PRESENT ILLNESS
78M with PMH of hypertension, hyperlipidemia, hypothyroidism, COPD/ Empysema, CKD, DM2, vocal cord CA s/p RT 1996, lung cancer s/p chemo and RT, lung cancer metastasized to the right humerus s/p ORIF of right humerus on 7/2/19 presents c/o generalized fatigue and unable to ambulate x several weeks.  Symptoms progressively worsening.  No focal weakness / numbness.  States he has been unable to mobilize with wife at home.  Noted hypoxic with SaO2 89% on RA.  Episodic atrial tachycardia in ED.  Denies cough / fever / chills.  No additional complaints.     FAMILY HISTORY: reviewed and negative for thromboembolism.   SOCIAL HISTORY: - alcohol, - IVDA, + smoking has quit  REVIEW OF SYSTEMS: General: + fatigue, - weight loss, - fevers, - chills.  HEENT: - headache, - hearing disturbances.  EYES: - visual disturbances, - diplopia.  CARDIOVASCULAR: - chest pain, - palpitations.  PULMONARY: - SOB, - cough, - hemoptysis.  GI: - abdominal pain, - nausea, - vomiting, - diarrhea, - constipation.  : - urinary urgency, - frequency, - dysuria.  MUSCULOSKELETAL: - arthralgias, - myalgias.  NEURO:  - weakness, - numbness.  PSYCH: - depression, - anxiety, - suicidal thoughts. SKIN: - rashes, - lesions.  All remaining ROS are negative

## 2019-08-23 NOTE — ED ADULT NURSE REASSESSMENT NOTE - GENERAL PATIENT STATE
comfortable appearance/cooperative/resting/sleeping/smiling/interactive
smiling/interactive/cooperative/comfortable appearance
comfortable appearance

## 2019-08-23 NOTE — PROVIDER CONTACT NOTE (OTHER) - ASSESSMENT
8/22 - CM MET W/ PT AND WIFE REGARDING TP.  PT WOULD PREFER TO GO TO HOME BUT WILL THINK AND DISCUSS GOING TO [facility] W/ WIFE.  CM / SW starting the process of auth for poss [facility].     8/23 - CM RE-MET W/ PT AND WIFE AT .  PT IS REFUSING [facility] AT THIS TIME AND CHOOSING [facility].  CHOICE LIST GIVEN.  CHOOSING [facility], PRIVATE HIRE LIST GIVEN AS WELL.  SW CONTINUING TO OBTAIN AUTH IN CASE PT CHANGES HIS MIND.  CARDIO EVAL PENDING, POSS DC LATER TODAY
Pt requires Mod A for transfers and bed mobility, Min A for ambulation with HHA on Left UE 2*2 spouse states pt is not coordinated with SAC and it becomes more of a hindrance to use. (+) posterior lean and ataxia noted throughout gait assessment.

## 2019-08-23 NOTE — H&P ADULT - NSHPLABSRESULTS_GEN_ALL_CORE
VITALS:  Vital Signs Last 24 Hrs  T(C): 37.2 (23 Aug 2019 11:10), Max: 37.6 (22 Aug 2019 23:15)  T(F): 99 (23 Aug 2019 11:10), Max: 99.6 (22 Aug 2019 23:15)  HR: 80 (23 Aug 2019 11:10) (76 - 89)  BP: 114/69 (23 Aug 2019 11:10) (97/58 - 129/62)  BP(mean): --  RR: 18 (23 Aug 2019 11:10) (18 - 20)  SpO2: 97% (23 Aug 2019 11:10) (95% - 98%) Daily     Daily   CAPILLARY BLOOD GLUCOSE      POCT Blood Glucose.: 177 mg/dL (23 Aug 2019 11:32)  POCT Blood Glucose.: 117 mg/dL (23 Aug 2019 08:55)  POCT Blood Glucose.: 187 mg/dL (22 Aug 2019 21:23)  POCT Blood Glucose.: 122 mg/dL (22 Aug 2019 17:38)    I&O's Summary    22 Aug 2019 07:  -  23 Aug 2019 07:00  --------------------------------------------------------  IN: 0 mL / OUT: 1000 mL / NET: -1000 mL    23 Aug 2019 07:01  -  23 Aug 2019 14:49  --------------------------------------------------------  IN: 0 mL / OUT: 675 mL / NET: -675 mL        LABS:                        8.2    12.68 )-----------( 360      ( 22 Aug 2019 12:51 )             27.4     08-22    133<L>  |  97<L>  |  32.0<H>  ----------------------------<  113  4.2   |  21.0<L>  |  1.51<H>    Ca    8.5<L>      22 Aug 2019 12:51    TPro  6.4<L>  /  Alb  2.5<L>  /  TBili  0.7  /  DBili  x   /  AST  18  /  ALT  15  /  AlkPhos  142<H>  08      LIVER FUNCTIONS - ( 22 Aug 2019 12:51 )  Alb: 2.5 g/dL / Pro: 6.4 g/dL / ALK PHOS: 142 U/L / ALT: 15 U/L / AST: 18 U/L / GGT: x           Urinalysis Basic - ( 22 Aug 2019 21:12 )    Color: Yellow / Appearance: Clear / S.010 / pH: x  Gluc: x / Ketone: Negative  / Bili: Negative / Urobili: Negative mg/dL   Blood: x / Protein: Negative mg/dL / Nitrite: Negative   Leuk Esterase: Negative / RBC: x / WBC x   Sq Epi: x / Non Sq Epi: x / Bacteria: x      CARDIAC MARKERS ( 23 Aug 2019 01:26 )  x     / 0.03 ng/mL / x     / x     / x      CARDIAC MARKERS ( 22 Aug 2019 19:21 )  x     / 0.04 ng/mL / x     / x     / x      CARDIAC MARKERS ( 22 Aug 2019 12:51 )  x     / 0.05 ng/mL / x     / x     / x              MEDICATIONS:  ALBUTerol    90 MICROgram(s) HFA Inhaler 2 Puff(s) Inhalation every 6 hours PRN  ALPRAZolam 0.5 milliGRAM(s) Oral three times a day PRN  amLODIPine   Tablet 5 milliGRAM(s) Oral daily  bisacodyl 5 milliGRAM(s) Oral daily  calcitriol   Capsule 0.25 MICROGram(s) Oral daily  dextrose 40% Gel 15 Gram(s) Oral once PRN  dextrose 5%. 1000 milliLiter(s) IV Continuous <Continuous>  dextrose 50% Injectable 12.5 Gram(s) IV Push once  dextrose 50% Injectable 25 Gram(s) IV Push once  dextrose 50% Injectable 25 Gram(s) IV Push once  fentaNYL   Patch  75 MICROgram(s)/Hr. 1 Patch Transdermal every 48 hours  ferrous sulfate Oral Tab/Cap - Peds 325 milliGRAM(s) Oral two times a day with meals  FLUoxetine 20 milliGRAM(s) Oral daily  furosemide    Tablet 20 milliGRAM(s) Oral daily  gabapentin 800 milliGRAM(s) Oral three times a day  glucagon  Injectable 1 milliGRAM(s) IntraMuscular once PRN  insulin glargine Injectable (LANTUS) 30 Unit(s) SubCutaneous at bedtime  insulin lispro (HumaLOG) corrective regimen sliding scale   SubCutaneous three times a day before meals  levothyroxine 125 MICROGram(s) Oral daily  oxyCODONE    IR 5 milliGRAM(s) Oral every 4 hours PRN  oxyCODONE    IR 10 milliGRAM(s) Oral every 4 hours PRN  prochlorperazine   Tablet 10 milliGRAM(s) Oral every 8 hours PRN  simvastatin 20 milliGRAM(s) Oral at bedtime  tamsulosin 0.4 milliGRAM(s) Oral at bedtime    < from: US Duplex Venous Lower Ext Complete, Bilateral (19 @ 19:06) >    No evidence of deep venous thrombosis in either lower extremity.    < end of copied text >

## 2019-08-23 NOTE — ED CDU PROVIDER DISPOSITION NOTE - ATTENDING CONTRIBUTION TO CARE
Tom: I performed a face to face bedside interview with patient regarding history of present illness, review of symptoms and past medical history. I completed an independent physical exam.  I have discussed patient's plan of care with advanced care provider.   I agree with note as stated above including HISTORY OF PRESENT ILLNESS, HIV, PAST MEDICAL/SURGICAL/FAMILY/SOCIAL HISTORY, ALLERGIES AND HOME MEDICATIONS, REVIEW OF SYSTEMS, PHYSICAL EXAM, MEDICAL DECISION MAKING and any PROGRESS NOTES during the time I functioned as the attending physician for this patient  unless otherwise noted. My brief assessment is as follows: Pt with lung cancer with mets to the bone placed in CDU for worsening generalized weakness, worse in legs with multiple falls. Pt desatting to mid 80s with multiple runs of atrial tachycardia. CTPA ordered, [place] consulted. Patient admitted to the hospital.

## 2019-08-23 NOTE — ED ADULT NURSE REASSESSMENT NOTE - NS ED NURSE REASSESS COMMENT FT1
Care endorsed to Jayda Reardon RN Obs. Patient in no distress. VSS. IV bolus infusing as per orders. Patient awaiting MRI testing. Resting comfortably.
Patient present with fentanyl patch to Left arm 975mcg from home.
Pt alert and oriented. resting in stretcher, no signs of distress noted. Handoff report given to Romeo Escobar RN.  pt's safety maintained. RN with no questions or concerns at this time
Pt made aware of plan of care, repositioned for comfort.
Pt sleeping at this time, called by monitor tech, pt having a run of atrial tach and O2 sat had dropped to 86% on RA.  2LNC placed, pt O2 sat now is 97%, Abel ZEE called and made aware.  Pt awake at this time, denies any chest pain/discomfort or sob at this time.  Wife at the bedside.
Pt transported to cdu 7, placed on cardiac monitor.  handoff report given to RN CHOLO.
physical therapy at bedside to eval.
pt noted with L arm Fentanyl patch from home. SAADIA Flores made aware and per PA will place orders for new patch placement.
pt sleeping in stretcher, no apparent distress noted. bed in lowest position and safety maintained.
Assumed care of the patient at 1600. Patient transferred to observation unit CDU 7, placed on CM. Wife at the bedside. Patient A&Ox3. No s/s of distress. Right arm/shoulder pain persists, Sling present to R arm. Appears weak. VSS. PIV patent. NSR on CM. Patient awaiting MRI testing. Patient and wife in understanding of plan of care. Patient with no further questions for the nurse. Will continue to monitor.
Pt received @0730, A&OX3, denies pain at this time, fentanyl patch noted on LCW.  VSS.  Clear bsb, abd soft nondistended, nontender, moving all ext well, except RUE that has sling in place.  Incision on RUE is clean and intact, redness noted on upper area of incision in shoulder.
pt A&ox4. resting in stretcher with no complaints of pain or discomfort. remains hemodynamically stable.  awaiting MRA head/ c-xray. plan of care reviewed, pt verbalizes *(+) understanding. call bell within reach, bed in lowest position and safety maintained. Will continue to monitor.
resting in stretcher with no complaints of pain or discomfort. remains hemodynamically stable.  call bell within reach, bed in lowest position and safety maintained. Will continue to monitor.

## 2019-08-23 NOTE — ED CDU PROVIDER SUBSEQUENT DAY NOTE - ATTENDING CONTRIBUTION TO CARE
Tom: I performed a face to face bedside interview with patient regarding history of present illness, review of symptoms and past medical history. I completed an independent physical exam.  I have discussed patient's plan of care with advanced care provider.   I agree with note as stated above including HISTORY OF PRESENT ILLNESS, HIV, PAST MEDICAL/SURGICAL/FAMILY/SOCIAL HISTORY, ALLERGIES AND HOME MEDICATIONS, REVIEW OF SYSTEMS, PHYSICAL EXAM, MEDICAL DECISION MAKING and any PROGRESS NOTES during the time I functioned as the attending physician for this patient  unless otherwise noted. My brief assessment is as follows: 79M h/o lung cancer with mets to the bone p/w generalized weakness, worse in the legs, and multiple falls. Pt placed in CDU for MRI Head and SW/case management consult for possible aide at home vs rehab placement.

## 2019-08-23 NOTE — CHART NOTE - NSCHARTNOTEFT_GEN_A_CORE
SOCIAL WORK NOTE:  KANE CIRCULATED  TO ADDITIONAL FACILITES THIS MORNING TO CAPTURE FACILITIES CLOSER TO [place] WHERE HE LIVES AS PATIENT IS A BIT RESISTANT TO [facility].  ALL CLINICALS SUBMITTED TO [employer] VIA [employer].  KANE CIRCUALTING.  AS OF THIS MORNING, PATIENT IS REFUSING TO GO TO [facility].  WILL STILL CONTINUE PROCESS IN CASE PATIENT CHANGES HIS MIND WHILE DUAL PLANNING FOR [facility].

## 2019-08-23 NOTE — PATIENT PROFILE ADULT - NSASFUNCLEVELADLTRANSFER_GEN_A_NUR
Woke up with Headache and fever this am.  Temp 101.1 oral upon Arrival  
3 = assistive equipment and person

## 2019-08-23 NOTE — ED ADULT NURSE REASSESSMENT NOTE - COMFORT CARE
voiding in urinal
plan of care explained
plan of care explained/repositioned
plan of care explained/wait time explained

## 2019-08-24 LAB
ANION GAP SERPL CALC-SCNC: 10 MMOL/L — SIGNIFICANT CHANGE UP (ref 5–17)
BUN SERPL-MCNC: 24 MG/DL — HIGH (ref 8–20)
CALCIUM SERPL-MCNC: 8.5 MG/DL — LOW (ref 8.6–10.2)
CHLORIDE SERPL-SCNC: 99 MMOL/L — SIGNIFICANT CHANGE UP (ref 98–107)
CO2 SERPL-SCNC: 25 MMOL/L — SIGNIFICANT CHANGE UP (ref 22–29)
CREAT SERPL-MCNC: 1.28 MG/DL — SIGNIFICANT CHANGE UP (ref 0.5–1.3)
CULTURE RESULTS: NO GROWTH — SIGNIFICANT CHANGE UP
FERRITIN SERPL-MCNC: 591 NG/ML — HIGH (ref 30–400)
FOLATE SERPL-MCNC: 2.9 NG/ML — LOW
GLUCOSE BLDC GLUCOMTR-MCNC: 172 MG/DL — HIGH (ref 70–99)
GLUCOSE BLDC GLUCOMTR-MCNC: 189 MG/DL — HIGH (ref 70–99)
GLUCOSE BLDC GLUCOMTR-MCNC: 211 MG/DL — HIGH (ref 70–99)
GLUCOSE BLDC GLUCOMTR-MCNC: 227 MG/DL — HIGH (ref 70–99)
GLUCOSE SERPL-MCNC: 187 MG/DL — HIGH (ref 70–115)
HCT VFR BLD CALC: 28.6 % — LOW (ref 39–50)
HGB BLD-MCNC: 8.7 G/DL — LOW (ref 13–17)
IRON SATN MFR SERPL: 11 % — LOW (ref 16–55)
IRON SATN MFR SERPL: 17 UG/DL — LOW (ref 59–158)
MCHC RBC-ENTMCNC: 26.2 PG — LOW (ref 27–34)
MCHC RBC-ENTMCNC: 30.4 GM/DL — LOW (ref 32–36)
MCV RBC AUTO: 86.1 FL — SIGNIFICANT CHANGE UP (ref 80–100)
PLATELET # BLD AUTO: 350 K/UL — SIGNIFICANT CHANGE UP (ref 150–400)
POTASSIUM SERPL-MCNC: 5.1 MMOL/L — SIGNIFICANT CHANGE UP (ref 3.5–5.3)
POTASSIUM SERPL-SCNC: 5.1 MMOL/L — SIGNIFICANT CHANGE UP (ref 3.5–5.3)
RBC # BLD: 3.32 M/UL — LOW (ref 4.2–5.8)
RBC # BLD: 3.32 M/UL — LOW (ref 4.2–5.8)
RBC # FLD: 18.1 % — HIGH (ref 10.3–14.5)
RETICS #: 60.4 K/UL — SIGNIFICANT CHANGE UP (ref 25–125)
RETICS/RBC NFR: 1.8 % — SIGNIFICANT CHANGE UP (ref 0.5–2.5)
SODIUM SERPL-SCNC: 134 MMOL/L — LOW (ref 135–145)
SPECIMEN SOURCE: SIGNIFICANT CHANGE UP
TIBC SERPL-MCNC: 152 UG/DL — LOW (ref 220–430)
TRANSFERRIN SERPL-MCNC: 106 MG/DL — LOW (ref 180–329)
VIT B12 SERPL-MCNC: 333 PG/ML — SIGNIFICANT CHANGE UP (ref 232–1245)
WBC # BLD: 12.44 K/UL — HIGH (ref 3.8–10.5)
WBC # FLD AUTO: 12.44 K/UL — HIGH (ref 3.8–10.5)

## 2019-08-24 PROCEDURE — 99232 SBSQ HOSP IP/OBS MODERATE 35: CPT

## 2019-08-24 RX ORDER — BUDESONIDE AND FORMOTEROL FUMARATE DIHYDRATE 160; 4.5 UG/1; UG/1
2 AEROSOL RESPIRATORY (INHALATION)
Refills: 0 | Status: DISCONTINUED | OUTPATIENT
Start: 2019-08-24 | End: 2019-08-28

## 2019-08-24 RX ORDER — TIOTROPIUM BROMIDE 18 UG/1
1 CAPSULE ORAL; RESPIRATORY (INHALATION) DAILY
Refills: 0 | Status: DISCONTINUED | OUTPATIENT
Start: 2019-08-24 | End: 2019-08-28

## 2019-08-24 RX ORDER — PREGABALIN 225 MG/1
1000 CAPSULE ORAL DAILY
Refills: 0 | Status: COMPLETED | OUTPATIENT
Start: 2019-08-24 | End: 2019-08-28

## 2019-08-24 RX ORDER — FOLIC ACID 0.8 MG
1 TABLET ORAL DAILY
Refills: 0 | Status: DISCONTINUED | OUTPATIENT
Start: 2019-08-24 | End: 2019-08-28

## 2019-08-24 RX ORDER — FAMOTIDINE 10 MG/ML
20 INJECTION INTRAVENOUS DAILY
Refills: 0 | Status: DISCONTINUED | OUTPATIENT
Start: 2019-08-24 | End: 2019-08-28

## 2019-08-24 RX ORDER — ALBUTEROL 90 UG/1
2.5 AEROSOL, METERED ORAL ONCE
Refills: 0 | Status: COMPLETED | OUTPATIENT
Start: 2019-08-24 | End: 2019-08-24

## 2019-08-24 RX ADMIN — GABAPENTIN 800 MILLIGRAM(S): 400 CAPSULE ORAL at 22:31

## 2019-08-24 RX ADMIN — Medication 125 MICROGRAM(S): at 05:59

## 2019-08-24 RX ADMIN — INSULIN GLARGINE 30 UNIT(S): 100 INJECTION, SOLUTION SUBCUTANEOUS at 22:31

## 2019-08-24 RX ADMIN — HEPARIN SODIUM 5000 UNIT(S): 5000 INJECTION INTRAVENOUS; SUBCUTANEOUS at 05:58

## 2019-08-24 RX ADMIN — Medication 325 MILLIGRAM(S): at 16:57

## 2019-08-24 RX ADMIN — GABAPENTIN 800 MILLIGRAM(S): 400 CAPSULE ORAL at 12:24

## 2019-08-24 RX ADMIN — HEPARIN SODIUM 5000 UNIT(S): 5000 INJECTION INTRAVENOUS; SUBCUTANEOUS at 22:32

## 2019-08-24 RX ADMIN — Medication 20 MILLIGRAM(S): at 05:59

## 2019-08-24 RX ADMIN — HEPARIN SODIUM 5000 UNIT(S): 5000 INJECTION INTRAVENOUS; SUBCUTANEOUS at 12:24

## 2019-08-24 RX ADMIN — Medication 5 MILLIGRAM(S): at 12:24

## 2019-08-24 RX ADMIN — FENTANYL CITRATE 1 PATCH: 50 INJECTION INTRAVENOUS at 20:12

## 2019-08-24 RX ADMIN — GABAPENTIN 800 MILLIGRAM(S): 400 CAPSULE ORAL at 05:58

## 2019-08-24 RX ADMIN — CALCITRIOL 0.25 MICROGRAM(S): 0.5 CAPSULE ORAL at 12:24

## 2019-08-24 RX ADMIN — FENTANYL CITRATE 1 PATCH: 50 INJECTION INTRAVENOUS at 22:43

## 2019-08-24 RX ADMIN — Medication 325 MILLIGRAM(S): at 08:30

## 2019-08-24 RX ADMIN — Medication 2: at 08:30

## 2019-08-24 RX ADMIN — Medication 2: at 16:57

## 2019-08-24 RX ADMIN — OXYCODONE HYDROCHLORIDE 5 MILLIGRAM(S): 5 TABLET ORAL at 04:15

## 2019-08-24 RX ADMIN — ALBUTEROL 2.5 MILLIGRAM(S): 90 AEROSOL, METERED ORAL at 20:11

## 2019-08-24 RX ADMIN — FENTANYL CITRATE 1 PATCH: 50 INJECTION INTRAVENOUS at 07:56

## 2019-08-24 RX ADMIN — SIMVASTATIN 20 MILLIGRAM(S): 20 TABLET, FILM COATED ORAL at 22:32

## 2019-08-24 RX ADMIN — OXYCODONE HYDROCHLORIDE 10 MILLIGRAM(S): 5 TABLET ORAL at 20:28

## 2019-08-24 RX ADMIN — Medication 4: at 12:13

## 2019-08-24 RX ADMIN — PREGABALIN 1000 MICROGRAM(S): 225 CAPSULE ORAL at 12:14

## 2019-08-24 RX ADMIN — OXYCODONE HYDROCHLORIDE 5 MILLIGRAM(S): 5 TABLET ORAL at 03:21

## 2019-08-24 RX ADMIN — Medication 20 MILLIGRAM(S): at 12:24

## 2019-08-24 RX ADMIN — BUDESONIDE AND FORMOTEROL FUMARATE DIHYDRATE 2 PUFF(S): 160; 4.5 AEROSOL RESPIRATORY (INHALATION) at 19:56

## 2019-08-24 RX ADMIN — TAMSULOSIN HYDROCHLORIDE 0.4 MILLIGRAM(S): 0.4 CAPSULE ORAL at 22:31

## 2019-08-24 RX ADMIN — Medication 40 MILLIGRAM(S): at 05:58

## 2019-08-24 RX ADMIN — OXYCODONE HYDROCHLORIDE 10 MILLIGRAM(S): 5 TABLET ORAL at 19:28

## 2019-08-24 RX ADMIN — FAMOTIDINE 20 MILLIGRAM(S): 10 INJECTION INTRAVENOUS at 20:09

## 2019-08-24 RX ADMIN — FENTANYL CITRATE 1 PATCH: 50 INJECTION INTRAVENOUS at 22:44

## 2019-08-24 NOTE — PROGRESS NOTE ADULT - ASSESSMENT
79M with PMH of hypertension, hyperlipidemia, hypothyroidism, COPD/ Empysema, CKD, DM2, vocal cord CA s/p RT 1996, lung cancer s/p chemo and RT, lung cancer metastasized to the right humerus s/p ORIF of right humerus on 7/2/19 presents c/o generalized fatigue and unable to ambulate x several weeks.  Symptoms progressively worsening.       >Debility / Unable to Ambulate - PT eval recommending [facility] or [facility]    PMR consulted     CT head negative.  metallic clip in chest--MRI contraindicated   >Hypoxia / COPD / Emphysema - unclear baseline SaO2.  c/w O2 via nc.  Duonebs prn.      negative CTA for pulmonary embolism    add symbicort/spiriva  > Hyperlipidemia - diet modification.  Continue Statin.  > Essential HTN - Monitor BP.  Continue oral antihypertensives.  > Acquired Hypothyroidism - continue Synthroid.    TSH  > Diabetes Type II - monitor fingersticks.  Insulin coverage for hyperglycemia.    Hga1c  > Anemia - b12/folate/iron deficiency--start all supplements  > DVT Prophylaxis - Heparin subcut.       dispo: pending Pmr eval. 79M with PMH of hypertension, hyperlipidemia, hypothyroidism, COPD/ Empysema, CKD, DM2, vocal cord CA s/p RT 1996, lung cancer s/p chemo and RT, lung cancer metastasized to the right humerus s/p ORIF of right humerus on 7/2/19 presents c/o generalized fatigue and unable to ambulate x several weeks.  Symptoms progressively worsening.       >Debility / Unable to Ambulate - PT eval recommending [facility] or [facility]    PMR consulted     CT head negative.  metallic clip in chest--MRI contraindicated   >Hypoxia / COPD / Emphysema - unclear baseline SaO2.  c/w O2 via nc.  Duonebs prn.      negative CTA for pulmonary embolism    add symbicort/spiriva  >atrial tach: verapamil,  hypoxia induced?. check TSH   >lung CA: oncology follow up as outpatient   >Hyperlipidemia - diet modification.  Continue Statin.  >Essential HTN - Monitor BP.  Continue oral antihypertensives.  > Acquired Hypothyroidism - continue Synthroid.    TSH  > Diabetes Type II - monitor fingersticks.  Insulin coverage for hyperglycemia.    Hga1c  > Anemia - b12/folate/iron deficiency--start all supplements  > DVT Prophylaxis - Heparin subcut.       dispo: pending Pmr eval.

## 2019-08-24 NOTE — CHART NOTE - NSCHARTNOTEFT_GEN_A_CORE
called to see pt who's wife is concerned about onset of reflux and that pt is unable to clear secretions from upper airway.  Pt confirms both issues.  Pt states he has cough and that despite efforts to expectorate he cannot clear them.  Pt confirms past h/o GERD as well.    On exam: VS stable  Lungs- clear bilaterally but diminished bilaterally without rales/rhonchi or wheezes  Heart- s1s2 heard, RRR no murmur  Extremities no cyanosis clubbing or edema noted; capillary refill <3 sec.    Impression: 1- GERD                    2- airway mucous  Plan: 1- stat nebulizer x one          2- Protonix ordered          3- d/w pt and wife, all questions answered          4- d/w RN, agrees with plan of care and all questions answered

## 2019-08-24 NOTE — PROGRESS NOTE ADULT - SUBJECTIVE AND OBJECTIVE BOX
seen for debility    pain ok in right arm, sling present  minimal intermittent cough. no cp/sob  ros otherwise negative     MEDICATIONS  (STANDING):  bisacodyl 5 milliGRAM(s) Oral daily  buDESOnide 160 MICROgram(s)/formoterol 4.5 MICROgram(s) Inhaler 2 Puff(s) Inhalation two times a day  calcitriol   Capsule 0.25 MICROGram(s) Oral daily  cyanocobalamin Injectable 1000 MICROGram(s) SubCutaneous daily  dextrose 5%. 1000 milliLiter(s) (50 mL/Hr) IV Continuous <Continuous>  dextrose 50% Injectable 12.5 Gram(s) IV Push once  dextrose 50% Injectable 25 Gram(s) IV Push once  dextrose 50% Injectable 25 Gram(s) IV Push once  fentaNYL   Patch  75 MICROgram(s)/Hr. 1 Patch Transdermal every 48 hours  ferrous sulfate Oral Tab/Cap - Peds 325 milliGRAM(s) Oral two times a day with meals  FLUoxetine 20 milliGRAM(s) Oral daily  folic acid 1 milliGRAM(s) Oral daily  furosemide    Tablet 20 milliGRAM(s) Oral daily  gabapentin 800 milliGRAM(s) Oral three times a day  heparin  Injectable 5000 Unit(s) SubCutaneous every 8 hours  insulin glargine Injectable (LANTUS) 30 Unit(s) SubCutaneous at bedtime  insulin lispro (HumaLOG) corrective regimen sliding scale   SubCutaneous three times a day before meals  levothyroxine 125 MICROGram(s) Oral daily  simvastatin 20 milliGRAM(s) Oral at bedtime  tamsulosin 0.4 milliGRAM(s) Oral at bedtime  tiotropium 18 MICROgram(s) Capsule 1 Capsule(s) Inhalation daily  verapamil 40 milliGRAM(s) Oral daily    MEDICATIONS  (PRN):  ALBUTerol    90 MICROgram(s) HFA Inhaler 2 Puff(s) Inhalation every 6 hours PRN Wheezing  ALBUTerol/ipratropium for Nebulization 3 milliLiter(s) Nebulizer every 4 hours PRN Shortness of Breath and/or Wheezing  ALPRAZolam 0.5 milliGRAM(s) Oral three times a day PRN anxiety  dextrose 40% Gel 15 Gram(s) Oral once PRN Blood Glucose LESS THAN 70 milliGRAM(s)/deciliter  glucagon  Injectable 1 milliGRAM(s) IntraMuscular once PRN Glucose LESS THAN 70 milligrams/deciliter  oxyCODONE    IR 5 milliGRAM(s) Oral every 4 hours PRN mild - moderate pain  oxyCODONE    IR 10 milliGRAM(s) Oral every 4 hours PRN Severe Pain (7 - 10)  prochlorperazine   Tablet 10 milliGRAM(s) Oral every 8 hours PRN nausea      Allergies    No Known Allergies    Vital Signs Last 24 Hrs  T(C): 36.9 (24 Aug 2019 07:48), Max: 37.3 (23 Aug 2019 20:44)  T(F): 98.4 (24 Aug 2019 07:48), Max: 99.2 (23 Aug 2019 20:44)  HR: 80 (24 Aug 2019 07:48) (74 - 84)  BP: 109/62 (24 Aug 2019 07:48) (109/62 - 136/70)  BP(mean): --  RR: 19 (24 Aug 2019 07:48) (18 - 19)  SpO2: 93% (24 Aug 2019 08:00) (93% - 97%)    PHYSICAL EXAM:    GENERAL: NAD  CHEST/LUNG: ctab but diminished diffusely, no wheezing   HEART: Regular rate and rhythm; S1 S2  ABDOMEN: Soft,  Bowel sounds present  EXTREMITIES: no LE edema   right arm in sling  NERVOUS SYSTEM:  Alert & Oriented X3,  gen weakness Motor Strength 5/5 B/L upper and lower extremities  PSYCH: normal mood, appropriate response.    LABS:                        8.7    12.44 )-----------( 350      ( 24 Aug 2019 06:36 )             28.6     08-24    134<L>  |  99  |  24.0<H>  ----------------------------<  187<H>  5.1   |  25.0  |  1.28    Ca    8.5<L>      24 Aug 2019 06:36    TPro  6.4<L>  /  Alb  2.5<L>  /  TBili  0.7  /  DBili  x   /  AST  18  /  ALT  15  /  AlkPhos  142<H>  08-22      Urinalysis Basic - ( 22 Aug 2019 21:12 )    Color: Yellow / Appearance: Clear / S.010 / pH: x  Gluc: x / Ketone: Negative  / Bili: Negative / Urobili: Negative mg/dL   Blood: x / Protein: Negative mg/dL / Nitrite: Negative   Leuk Esterase: Negative / RBC: x / WBC x   Sq Epi: x / Non Sq Epi: x / Bacteria: x        CAPILLARY BLOOD GLUCOSE      POCT Blood Glucose.: 227 mg/dL (24 Aug 2019 11:28)  POCT Blood Glucose.: 189 mg/dL (24 Aug 2019 08:08)  POCT Blood Glucose.: 244 mg/dL (23 Aug 2019 22:39)  POCT Blood Glucose.: 145 mg/dL (23 Aug 2019 17:49)        RADIOLOGY & ADDITIONAL TESTS:

## 2019-08-25 LAB
ANION GAP SERPL CALC-SCNC: 11 MMOL/L — SIGNIFICANT CHANGE UP (ref 5–17)
BASOPHILS # BLD AUTO: 0.03 K/UL — SIGNIFICANT CHANGE UP (ref 0–0.2)
BASOPHILS NFR BLD AUTO: 0.3 % — SIGNIFICANT CHANGE UP (ref 0–2)
BLD GP AB SCN SERPL QL: SIGNIFICANT CHANGE UP
BUN SERPL-MCNC: 24 MG/DL — HIGH (ref 8–20)
CALCIUM SERPL-MCNC: 7.9 MG/DL — LOW (ref 8.6–10.2)
CHLORIDE SERPL-SCNC: 97 MMOL/L — LOW (ref 98–107)
CO2 SERPL-SCNC: 24 MMOL/L — SIGNIFICANT CHANGE UP (ref 22–29)
CREAT SERPL-MCNC: 1.16 MG/DL — SIGNIFICANT CHANGE UP (ref 0.5–1.3)
EOSINOPHIL # BLD AUTO: 0.02 K/UL — SIGNIFICANT CHANGE UP (ref 0–0.5)
EOSINOPHIL NFR BLD AUTO: 0.2 % — SIGNIFICANT CHANGE UP (ref 0–6)
GLUCOSE BLDC GLUCOMTR-MCNC: 197 MG/DL — HIGH (ref 70–99)
GLUCOSE BLDC GLUCOMTR-MCNC: 204 MG/DL — HIGH (ref 70–99)
GLUCOSE BLDC GLUCOMTR-MCNC: 206 MG/DL — HIGH (ref 70–99)
GLUCOSE BLDC GLUCOMTR-MCNC: 209 MG/DL — HIGH (ref 70–99)
GLUCOSE SERPL-MCNC: 204 MG/DL — HIGH (ref 70–115)
HBA1C BLD-MCNC: 5.8 % — HIGH (ref 4–5.6)
HCT VFR BLD CALC: 26.3 % — LOW (ref 39–50)
HGB BLD-MCNC: 8 G/DL — LOW (ref 13–17)
IMM GRANULOCYTES NFR BLD AUTO: 0.9 % — SIGNIFICANT CHANGE UP (ref 0–1.5)
LYMPHOCYTES # BLD AUTO: 0.69 K/UL — LOW (ref 1–3.3)
LYMPHOCYTES # BLD AUTO: 5.8 % — LOW (ref 13–44)
MCHC RBC-ENTMCNC: 26.2 PG — LOW (ref 27–34)
MCHC RBC-ENTMCNC: 30.4 GM/DL — LOW (ref 32–36)
MCV RBC AUTO: 86.2 FL — SIGNIFICANT CHANGE UP (ref 80–100)
MONOCYTES # BLD AUTO: 0.68 K/UL — SIGNIFICANT CHANGE UP (ref 0–0.9)
MONOCYTES NFR BLD AUTO: 5.7 % — SIGNIFICANT CHANGE UP (ref 2–14)
NEUTROPHILS # BLD AUTO: 10.4 K/UL — HIGH (ref 1.8–7.4)
NEUTROPHILS NFR BLD AUTO: 87.1 % — HIGH (ref 43–77)
OB PNL STL: NEGATIVE — SIGNIFICANT CHANGE UP
PLATELET # BLD AUTO: 323 K/UL — SIGNIFICANT CHANGE UP (ref 150–400)
POTASSIUM SERPL-MCNC: 4.6 MMOL/L — SIGNIFICANT CHANGE UP (ref 3.5–5.3)
POTASSIUM SERPL-SCNC: 4.6 MMOL/L — SIGNIFICANT CHANGE UP (ref 3.5–5.3)
PROCALCITONIN SERPL-MCNC: 0.31 NG/ML — HIGH (ref 0.02–0.1)
RBC # BLD: 3.05 M/UL — LOW (ref 4.2–5.8)
RBC # FLD: 18.1 % — HIGH (ref 10.3–14.5)
SODIUM SERPL-SCNC: 132 MMOL/L — LOW (ref 135–145)
TSH SERPL-MCNC: 5.19 UIU/ML — HIGH (ref 0.27–4.2)
WBC # BLD: 11.93 K/UL — HIGH (ref 3.8–10.5)
WBC # FLD AUTO: 11.93 K/UL — HIGH (ref 3.8–10.5)

## 2019-08-25 PROCEDURE — 99233 SBSQ HOSP IP/OBS HIGH 50: CPT

## 2019-08-25 RX ORDER — INSULIN GLARGINE 100 [IU]/ML
25 INJECTION, SOLUTION SUBCUTANEOUS AT BEDTIME
Refills: 0 | Status: DISCONTINUED | OUTPATIENT
Start: 2019-08-25 | End: 2019-08-26

## 2019-08-25 RX ADMIN — TIOTROPIUM BROMIDE 1 CAPSULE(S): 18 CAPSULE ORAL; RESPIRATORY (INHALATION) at 08:13

## 2019-08-25 RX ADMIN — Medication 20 MILLIGRAM(S): at 06:06

## 2019-08-25 RX ADMIN — Medication 0.5 MILLIGRAM(S): at 18:09

## 2019-08-25 RX ADMIN — FENTANYL CITRATE 1 PATCH: 50 INJECTION INTRAVENOUS at 08:12

## 2019-08-25 RX ADMIN — BUDESONIDE AND FORMOTEROL FUMARATE DIHYDRATE 2 PUFF(S): 160; 4.5 AEROSOL RESPIRATORY (INHALATION) at 08:14

## 2019-08-25 RX ADMIN — Medication 4: at 17:09

## 2019-08-25 RX ADMIN — TAMSULOSIN HYDROCHLORIDE 0.4 MILLIGRAM(S): 0.4 CAPSULE ORAL at 21:43

## 2019-08-25 RX ADMIN — GABAPENTIN 800 MILLIGRAM(S): 400 CAPSULE ORAL at 06:06

## 2019-08-25 RX ADMIN — Medication 5 MILLIGRAM(S): at 12:41

## 2019-08-25 RX ADMIN — Medication 125 MICROGRAM(S): at 06:06

## 2019-08-25 RX ADMIN — PREGABALIN 1000 MICROGRAM(S): 225 CAPSULE ORAL at 11:55

## 2019-08-25 RX ADMIN — HEPARIN SODIUM 5000 UNIT(S): 5000 INJECTION INTRAVENOUS; SUBCUTANEOUS at 12:41

## 2019-08-25 RX ADMIN — Medication 325 MILLIGRAM(S): at 17:09

## 2019-08-25 RX ADMIN — FAMOTIDINE 20 MILLIGRAM(S): 10 INJECTION INTRAVENOUS at 12:41

## 2019-08-25 RX ADMIN — GABAPENTIN 800 MILLIGRAM(S): 400 CAPSULE ORAL at 12:41

## 2019-08-25 RX ADMIN — Medication 1 MILLIGRAM(S): at 12:41

## 2019-08-25 RX ADMIN — SIMVASTATIN 20 MILLIGRAM(S): 20 TABLET, FILM COATED ORAL at 21:43

## 2019-08-25 RX ADMIN — Medication 325 MILLIGRAM(S): at 08:10

## 2019-08-25 RX ADMIN — Medication 20 MILLIGRAM(S): at 12:41

## 2019-08-25 RX ADMIN — HEPARIN SODIUM 5000 UNIT(S): 5000 INJECTION INTRAVENOUS; SUBCUTANEOUS at 06:06

## 2019-08-25 RX ADMIN — Medication 4: at 08:11

## 2019-08-25 RX ADMIN — Medication 40 MILLIGRAM(S): at 06:22

## 2019-08-25 RX ADMIN — HEPARIN SODIUM 5000 UNIT(S): 5000 INJECTION INTRAVENOUS; SUBCUTANEOUS at 21:42

## 2019-08-25 RX ADMIN — Medication 4: at 11:55

## 2019-08-25 RX ADMIN — BUDESONIDE AND FORMOTEROL FUMARATE DIHYDRATE 2 PUFF(S): 160; 4.5 AEROSOL RESPIRATORY (INHALATION) at 20:45

## 2019-08-25 RX ADMIN — CALCITRIOL 0.25 MICROGRAM(S): 0.5 CAPSULE ORAL at 12:41

## 2019-08-25 RX ADMIN — GABAPENTIN 800 MILLIGRAM(S): 400 CAPSULE ORAL at 21:49

## 2019-08-25 RX ADMIN — FENTANYL CITRATE 1 PATCH: 50 INJECTION INTRAVENOUS at 20:17

## 2019-08-25 RX ADMIN — INSULIN GLARGINE 25 UNIT(S): 100 INJECTION, SOLUTION SUBCUTANEOUS at 21:42

## 2019-08-25 NOTE — PROGRESS NOTE ADULT - ASSESSMENT
78 y.o. Male with PMH of HTN, HLD,  Hypothyroidism, COPD/ Empysema, CKD, DMII, vocal cord CA s/p RT 1996, lung cancer s/p chemo and RT, lung cancer metastasized to the right humerus s/p ORIF of right humerus on 7/2/19 presents c/o generalized fatigue and unable to ambulate x several weeks with Symptoms progressively worsening.       >Debility/Unable to Ambulate/weakness - multifactorial with recent surgery, anemia, atrial tachycardia - PT eval recommending MANUEL or AR, PMR consulted, CT head negative.      >Hypoxia / COPD / Emphysema - O2, BDs, no evidence of PE, added symbicort/spiriv    >atrial tach: verapamil    >lung CA: oncology follow up as outpatient     >Hyperlipidemia - diet modification.  Continue Statin.    >Essential HTN - Monitor BP.  Continue oral antihypertensives.    > Acquired Hypothyroidism - continue Synthroid, TSH mildly elevated, cont current dose    > Diabetes Type II - with hyperglycemia, lantus adjusted, cont HISS    > Anemia - b12/folate/iron deficiency--started all supplements   - transfuse 1 unit PRBCs due to symptomatic anemia    > DVT Prophylaxis - Heparin subcut.

## 2019-08-25 NOTE — PROGRESS NOTE ADULT - SUBJECTIVE AND OBJECTIVE BOX
CC: Fatigue (24 Aug 2019 12:45)    HPI: 78 y.o. Male with PMH of hypertension, hyperlipidemia, hypothyroidism, COPD/ Empysema, CKD, DM2, vocal cord CA s/p RT 1996, lung cancer s/p chemo and RT, lung cancer metastasized to the right humerus s/p ORIF of right humerus on 7/2/19 presents c/o generalized fatigue and unable to ambulate x several weeks.  Symptoms progressively worsening.  No focal weakness / numbness.  States he has been unable to mobilize with wife at home.  Noted hypoxic with SaO2 89% on RA.  Episodic atrial tachycardia in ED.  Denies cough / fever / chills.  No additional complaints.     FAMILY HISTORY: reviewed and negative for thromboembolism.   SOCIAL HISTORY: - alcohol, - IVDA, + smoking has quit  REVIEW OF SYSTEMS: General: + fatigue, - weight loss, - fevers, - chills.  HEENT: - headache, - hearing disturbances.  EYES: - visual disturbances, - diplopia.  CARDIOVASCULAR: - chest pain, - palpitations.  PULMONARY: - SOB, - cough, - hemoptysis.  GI: - abdominal pain, - nausea, - vomiting, - diarrhea, - constipation.  : - urinary urgency, - frequency, - dysuria.  MUSCULOSKELETAL: - arthralgias, - myalgias.  NEURO:  - weakness, - numbness.  PSYCH: - depression, - anxiety, - suicidal thoughts. SKIN: - rashes, - lesions.  All remaining ROS are negative (23 Aug 2019 14:45)    INTERVAL HPI/OVERNIGHT EVENTS:    Vital Signs Last 24 Hrs  T(C): 37.2 (25 Aug 2019 17:20), Max: 37.3 (25 Aug 2019 05:04)  T(F): 98.9 (25 Aug 2019 17:20), Max: 99.1 (25 Aug 2019 05:04)  HR: 74 (25 Aug 2019 17:20) (68 - 81)  BP: 112/56 (25 Aug 2019 17:20) (96/54 - 129/62)  BP(mean): --  RR: 18 (25 Aug 2019 17:20) (17 - 18)  SpO2: 92% (25 Aug 2019 17:20) (91% - 98%)  I&O's Detail    24 Aug 2019 07:01  -  25 Aug 2019 07:00  --------------------------------------------------------  IN:    Oral Fluid: 560 mLTotal IN: 560 mL    OUT:    Voided: 1200 mL  Total OUT: 1200 mL    Total NET: -640 mL      25 Aug 2019 07:01  -  25 Aug 2019 17:57  --------------------------------------------------------  IN:  Total IN: 0 mL    OUT:    Voided: 850 mL  Total OUT: 850 mL    Total NET: -850 mL                                    8.0    11.93 )-----------( 323      ( 25 Aug 2019 06:54 )             26.3     25 Aug 2019 06:54    132    |  97     |  24.0   ----------------------------<  204    4.6     |  24.0   |  1.16     Ca    7.9        25 Aug 2019 06:54        CAPILLARY BLOOD GLUCOSE      POCT Blood Glucose.: 204 mg/dL (25 Aug 2019 16:40)  POCT Blood Glucose.: 206 mg/dL (25 Aug 2019 11:52)  POCT Blood Glucose.: 209 mg/dL (25 Aug 2019 07:48)  POCT Blood Glucose.: 211 mg/dL (24 Aug 2019 22:30)        Hemoglobin A1C, Whole Blood: 5.8 % (08-25-19 @ 06:54)    MEDICATIONS  (STANDING):  bisacodyl 5 milliGRAM(s) Oral daily  buDESOnide 160 MICROgram(s)/formoterol 4.5 MICROgram(s) Inhaler 2 Puff(s) Inhalation two times a day  calcitriol   Capsule 0.25 MICROGram(s) Oral daily  cyanocobalamin Injectable 1000 MICROGram(s) SubCutaneous daily  dextrose 5%. 1000 milliLiter(s) (50 mL/Hr) IV Continuous <Continuous>  dextrose 50% Injectable 12.5 Gram(s) IV Push once  dextrose 50% Injectable 25 Gram(s) IV Push once  dextrose 50% Injectable 25 Gram(s) IV Push once  famotidine    Tablet 20 milliGRAM(s) Oral daily  fentaNYL   Patch  75 MICROgram(s)/Hr. 1 Patch Transdermal every 48 hours  ferrous sulfate Oral Tab/Cap - Peds 325 milliGRAM(s) Oral two times a day with meals  FLUoxetine 20 milliGRAM(s) Oral daily  folic acid 1 milliGRAM(s) Oral daily  furosemide    Tablet 20 milliGRAM(s) Oral daily  gabapentin 800 milliGRAM(s) Oral three times a day  heparin  Injectable 5000 Unit(s) SubCutaneous every 8 hours  insulin glargine Injectable (LANTUS) 25 Unit(s) SubCutaneous at bedtime  insulin lispro (HumaLOG) corrective regimen sliding scale   SubCutaneous three times a day before meals  levothyroxine 125 MICROGram(s) Oral daily  simvastatin 20 milliGRAM(s) Oral at bedtime  tamsulosin 0.4 milliGRAM(s) Oral at bedtime  tiotropium 18 MICROgram(s) Capsule 1 Capsule(s) Inhalation daily  verapamil 40 milliGRAM(s) Oral daily    MEDICATIONS  (PRN):  ALBUTerol    90 MICROgram(s) HFA Inhaler 2 Puff(s) Inhalation every 6 hours PRN Wheezing  ALBUTerol/ipratropium for Nebulization 3 milliLiter(s) Nebulizer every 4 hours PRN Shortness of Breath and/or Wheezing  ALPRAZolam 0.5 milliGRAM(s) Oral three times a day PRN anxiety  dextrose 40% Gel 15 Gram(s) Oral once PRN Blood Glucose LESS THAN 70 milliGRAM(s)/deciliter  glucagon  Injectable 1 milliGRAM(s) IntraMuscular once PRN Glucose LESS THAN 70 milligrams/deciliter  oxyCODONE    IR 5 milliGRAM(s) Oral every 4 hours PRN mild - moderate pain  oxyCODONE    IR 10 milliGRAM(s) Oral every 4 hours PRN Severe Pain (7 - 10)  prochlorperazine   Tablet 10 milliGRAM(s) Oral every 8 hours PRN nausea      RADIOLOGY & ADDITIONAL TESTS: CC: Fatigue (24 Aug 2019 12:45)    HPI: 78 y.o. Male with PMH of hypertension, hyperlipidemia, hypothyroidism, COPD/ Empysema, CKD, DM2, vocal cord CA s/p RT 1996, lung cancer s/p chemo and RT, lung cancer metastasized to the right humerus s/p ORIF of right humerus on 7/2/19 presents c/o generalized fatigue and unable to ambulate x several weeks.  Symptoms progressively worsening.  No focal weakness / numbness.  States he has been unable to mobilize with wife at home.  Noted hypoxic with SaO2 89% on RA.  Episodic atrial tachycardia in ED.  Denies cough / fever / chills.  No additional complaints.     INTERVAL HPI/OVERNIGHT EVENTS: + weakness, unable to stand on his own, + BLAKELY/SOB  Other ROS reviewed and neg     Vital Signs Last 24 Hrs  T(C): 37.2 (25 Aug 2019 17:20), Max: 37.3 (25 Aug 2019 05:04)  T(F): 98.9 (25 Aug 2019 17:20), Max: 99.1 (25 Aug 2019 05:04)  HR: 74 (25 Aug 2019 17:20) (68 - 81)  BP: 112/56 (25 Aug 2019 17:20) (96/54 - 129/62)  RR: 18 (25 Aug 2019 17:20) (17 - 18)  SpO2: 92% (25 Aug 2019 17:20) (91% - 98%)  I&O's Detail    24 Aug 2019 07:01  -  25 Aug 2019 07:00  --------------------------------------------------------  IN:    Oral Fluid: 560 mLTotal IN: 560 mL    OUT:    Voided: 1200 mL  Total OUT: 1200 mL    Total NET: -640 mL      25 Aug 2019 07:01  -  25 Aug 2019 17:57  --------------------------------------------------------  IN:  Total IN: 0 mL    OUT:    Voided: 850 mL  Total OUT: 850 mL    Total NET: -850 mL                            8.0    11.93 )-----------( 323      ( 25 Aug 2019 06:54 )             26.3     25 Aug 2019 06:54    132    |  97     |  24.0   ----------------------------<  204    4.6     |  24.0   |  1.16     Ca    7.9        25 Aug 2019 06:54    CAPILLARY BLOOD GLUCOSE    POCT Blood Glucose.: 204 mg/dL (25 Aug 2019 16:40)  POCT Blood Glucose.: 206 mg/dL (25 Aug 2019 11:52)  POCT Blood Glucose.: 209 mg/dL (25 Aug 2019 07:48)  POCT Blood Glucose.: 211 mg/dL (24 Aug 2019 22:30)    Hemoglobin A1C, Whole Blood: 5.8 % (08-25-19 @ 06:54)    MEDICATIONS  (STANDING):  bisacodyl 5 milliGRAM(s) Oral daily  buDESOnide 160 MICROgram(s)/formoterol 4.5 MICROgram(s) Inhaler 2 Puff(s) Inhalation two times a day  calcitriol   Capsule 0.25 MICROGram(s) Oral daily  cyanocobalamin Injectable 1000 MICROGram(s) SubCutaneous daily  dextrose 5%. 1000 milliLiter(s) (50 mL/Hr) IV Continuous <Continuous>  dextrose 50% Injectable 12.5 Gram(s) IV Push once  dextrose 50% Injectable 25 Gram(s) IV Push once  dextrose 50% Injectable 25 Gram(s) IV Push once  famotidine    Tablet 20 milliGRAM(s) Oral daily  fentaNYL   Patch  75 MICROgram(s)/Hr. 1 Patch Transdermal every 48 hours  ferrous sulfate Oral Tab/Cap - Peds 325 milliGRAM(s) Oral two times a day with meals  FLUoxetine 20 milliGRAM(s) Oral daily  folic acid 1 milliGRAM(s) Oral daily  furosemide    Tablet 20 milliGRAM(s) Oral daily  gabapentin 800 milliGRAM(s) Oral three times a day  heparin  Injectable 5000 Unit(s) SubCutaneous every 8 hours  insulin glargine Injectable (LANTUS) 25 Unit(s) SubCutaneous at bedtime  insulin lispro (HumaLOG) corrective regimen sliding scale   SubCutaneous three times a day before meals  levothyroxine 125 MICROGram(s) Oral daily  simvastatin 20 milliGRAM(s) Oral at bedtime  tamsulosin 0.4 milliGRAM(s) Oral at bedtime  tiotropium 18 MICROgram(s) Capsule 1 Capsule(s) Inhalation daily  verapamil 40 milliGRAM(s) Oral daily    MEDICATIONS  (PRN):  ALBUTerol    90 MICROgram(s) HFA Inhaler 2 Puff(s) Inhalation every 6 hours PRN Wheezing  ALBUTerol/ipratropium for Nebulization 3 milliLiter(s) Nebulizer every 4 hours PRN Shortness of Breath and/or Wheezing  ALPRAZolam 0.5 milliGRAM(s) Oral three times a day PRN anxiety  dextrose 40% Gel 15 Gram(s) Oral once PRN Blood Glucose LESS THAN 70 milliGRAM(s)/deciliter  glucagon  Injectable 1 milliGRAM(s) IntraMuscular once PRN Glucose LESS THAN 70 milligrams/deciliter  oxyCODONE    IR 5 milliGRAM(s) Oral every 4 hours PRN mild - moderate pain  oxyCODONE    IR 10 milliGRAM(s) Oral every 4 hours PRN Severe Pain (7 - 10)  prochlorperazine   Tablet 10 milliGRAM(s) Oral every 8 hours PRN nausea    RADIOLOGY & ADDITIONAL TESTS: personally visualized    PHYSICAL EXAM:    General: elderly debilitated male in no acute distress  Eyes: PERRLA, EOMI; conjunctiva and sclera clear  Head: Normocephalic; atraumatic  ENMT: No nasal discharge; airway clear  Neck: Supple; non tender; no masses  Respiratory: No wheezes, rales or rhonchi, decreased BS at bases BL  Cardiovascular: Regular rate and rhythm. S1 and S2   Gastrointestinal: Soft non-tender non-distended; Normal bowel sounds  Genitourinary: No costovertebral angle tenderness  Extremities: No clubbing, cyanosis or edema, RUE in splint  Vascular: Peripheral pulses palpable 2+ bilaterally  Neurological: Alert and oriented x4  Skin: Warm and dry. Pale  Musculoskeletal: Normal tone, without deformities  Psychiatric: Cooperative and appropriate

## 2019-08-26 ENCOUNTER — TRANSCRIPTION ENCOUNTER (OUTPATIENT)
Age: 79
End: 2019-08-26

## 2019-08-26 LAB
GLUCOSE BLDC GLUCOMTR-MCNC: 203 MG/DL — HIGH (ref 70–99)
GLUCOSE BLDC GLUCOMTR-MCNC: 216 MG/DL — HIGH (ref 70–99)
GLUCOSE BLDC GLUCOMTR-MCNC: 238 MG/DL — HIGH (ref 70–99)
GLUCOSE BLDC GLUCOMTR-MCNC: 252 MG/DL — HIGH (ref 70–99)
HCT VFR BLD CALC: 31.1 % — LOW (ref 39–50)
HGB BLD-MCNC: 9.7 G/DL — LOW (ref 13–17)
MCHC RBC-ENTMCNC: 26.9 PG — LOW (ref 27–34)
MCHC RBC-ENTMCNC: 31.2 GM/DL — LOW (ref 32–36)
MCV RBC AUTO: 86.1 FL — SIGNIFICANT CHANGE UP (ref 80–100)
PLATELET # BLD AUTO: 340 K/UL — SIGNIFICANT CHANGE UP (ref 150–400)
RBC # BLD: 3.61 M/UL — LOW (ref 4.2–5.8)
RBC # FLD: 17.9 % — HIGH (ref 10.3–14.5)
WBC # BLD: 13.38 K/UL — HIGH (ref 3.8–10.5)
WBC # FLD AUTO: 13.38 K/UL — HIGH (ref 3.8–10.5)

## 2019-08-26 PROCEDURE — 99223 1ST HOSP IP/OBS HIGH 75: CPT

## 2019-08-26 PROCEDURE — 99232 SBSQ HOSP IP/OBS MODERATE 35: CPT

## 2019-08-26 RX ORDER — METFORMIN HYDROCHLORIDE 850 MG/1
1 TABLET ORAL
Qty: 0 | Refills: 0 | DISCHARGE

## 2019-08-26 RX ORDER — AMLODIPINE BESYLATE 2.5 MG/1
1 TABLET ORAL
Qty: 0 | Refills: 0 | DISCHARGE

## 2019-08-26 RX ORDER — HYDROMORPHONE HYDROCHLORIDE 2 MG/ML
1 INJECTION INTRAMUSCULAR; INTRAVENOUS; SUBCUTANEOUS
Qty: 0 | Refills: 0 | DISCHARGE

## 2019-08-26 RX ORDER — VERAPAMIL HCL 240 MG
1 CAPSULE, EXTENDED RELEASE PELLETS 24 HR ORAL
Qty: 0 | Refills: 0 | DISCHARGE
Start: 2019-08-26

## 2019-08-26 RX ORDER — FAMOTIDINE 10 MG/ML
1 INJECTION INTRAVENOUS
Qty: 0 | Refills: 0 | DISCHARGE
Start: 2019-08-26

## 2019-08-26 RX ORDER — NIVOLUMAB 10 MG/ML
0 INJECTION INTRAVENOUS
Qty: 0 | Refills: 0 | DISCHARGE

## 2019-08-26 RX ORDER — PREGABALIN 225 MG/1
1 CAPSULE ORAL
Qty: 0 | Refills: 0 | DISCHARGE
Start: 2019-08-26

## 2019-08-26 RX ORDER — TIOTROPIUM BROMIDE 18 UG/1
1 CAPSULE ORAL; RESPIRATORY (INHALATION)
Qty: 0 | Refills: 0 | DISCHARGE
Start: 2019-08-26

## 2019-08-26 RX ORDER — FERROUS SULFATE 325(65) MG
1 TABLET ORAL
Qty: 0 | Refills: 0 | DISCHARGE
Start: 2019-08-26

## 2019-08-26 RX ORDER — INSULIN GLARGINE 100 [IU]/ML
30 INJECTION, SOLUTION SUBCUTANEOUS AT BEDTIME
Refills: 0 | Status: DISCONTINUED | OUTPATIENT
Start: 2019-08-26 | End: 2019-08-28

## 2019-08-26 RX ORDER — FOLIC ACID 0.8 MG
1 TABLET ORAL
Qty: 0 | Refills: 0 | DISCHARGE
Start: 2019-08-26

## 2019-08-26 RX ORDER — BUDESONIDE AND FORMOTEROL FUMARATE DIHYDRATE 160; 4.5 UG/1; UG/1
2 AEROSOL RESPIRATORY (INHALATION)
Qty: 0 | Refills: 0 | DISCHARGE
Start: 2019-08-26

## 2019-08-26 RX ORDER — DENOSUMAB 60 MG/ML
0 INJECTION SUBCUTANEOUS
Qty: 0 | Refills: 0 | DISCHARGE

## 2019-08-26 RX ADMIN — FENTANYL CITRATE 1 PATCH: 50 INJECTION INTRAVENOUS at 19:45

## 2019-08-26 RX ADMIN — OXYCODONE HYDROCHLORIDE 10 MILLIGRAM(S): 5 TABLET ORAL at 07:44

## 2019-08-26 RX ADMIN — Medication 4: at 16:59

## 2019-08-26 RX ADMIN — GABAPENTIN 800 MILLIGRAM(S): 400 CAPSULE ORAL at 05:47

## 2019-08-26 RX ADMIN — HEPARIN SODIUM 5000 UNIT(S): 5000 INJECTION INTRAVENOUS; SUBCUTANEOUS at 05:46

## 2019-08-26 RX ADMIN — Medication 20 MILLIGRAM(S): at 05:47

## 2019-08-26 RX ADMIN — INSULIN GLARGINE 30 UNIT(S): 100 INJECTION, SOLUTION SUBCUTANEOUS at 21:26

## 2019-08-26 RX ADMIN — PREGABALIN 1000 MICROGRAM(S): 225 CAPSULE ORAL at 11:47

## 2019-08-26 RX ADMIN — GABAPENTIN 800 MILLIGRAM(S): 400 CAPSULE ORAL at 13:30

## 2019-08-26 RX ADMIN — CALCITRIOL 0.25 MICROGRAM(S): 0.5 CAPSULE ORAL at 11:47

## 2019-08-26 RX ADMIN — Medication 1 MILLIGRAM(S): at 11:47

## 2019-08-26 RX ADMIN — Medication 325 MILLIGRAM(S): at 16:59

## 2019-08-26 RX ADMIN — FAMOTIDINE 20 MILLIGRAM(S): 10 INJECTION INTRAVENOUS at 11:47

## 2019-08-26 RX ADMIN — Medication 4: at 11:47

## 2019-08-26 RX ADMIN — BUDESONIDE AND FORMOTEROL FUMARATE DIHYDRATE 2 PUFF(S): 160; 4.5 AEROSOL RESPIRATORY (INHALATION) at 09:47

## 2019-08-26 RX ADMIN — Medication 325 MILLIGRAM(S): at 07:48

## 2019-08-26 RX ADMIN — HEPARIN SODIUM 5000 UNIT(S): 5000 INJECTION INTRAVENOUS; SUBCUTANEOUS at 21:26

## 2019-08-26 RX ADMIN — Medication 20 MILLIGRAM(S): at 11:47

## 2019-08-26 RX ADMIN — OXYCODONE HYDROCHLORIDE 10 MILLIGRAM(S): 5 TABLET ORAL at 08:40

## 2019-08-26 RX ADMIN — Medication 5 MILLIGRAM(S): at 11:47

## 2019-08-26 RX ADMIN — HEPARIN SODIUM 5000 UNIT(S): 5000 INJECTION INTRAVENOUS; SUBCUTANEOUS at 13:30

## 2019-08-26 RX ADMIN — Medication 40 MILLIGRAM(S): at 05:47

## 2019-08-26 RX ADMIN — Medication 4: at 07:45

## 2019-08-26 RX ADMIN — FENTANYL CITRATE 1 PATCH: 50 INJECTION INTRAVENOUS at 07:53

## 2019-08-26 RX ADMIN — GABAPENTIN 800 MILLIGRAM(S): 400 CAPSULE ORAL at 21:25

## 2019-08-26 RX ADMIN — OXYCODONE HYDROCHLORIDE 10 MILLIGRAM(S): 5 TABLET ORAL at 22:10

## 2019-08-26 RX ADMIN — OXYCODONE HYDROCHLORIDE 10 MILLIGRAM(S): 5 TABLET ORAL at 21:26

## 2019-08-26 RX ADMIN — Medication 125 MICROGRAM(S): at 05:47

## 2019-08-26 RX ADMIN — TIOTROPIUM BROMIDE 1 CAPSULE(S): 18 CAPSULE ORAL; RESPIRATORY (INHALATION) at 09:47

## 2019-08-26 RX ADMIN — BUDESONIDE AND FORMOTEROL FUMARATE DIHYDRATE 2 PUFF(S): 160; 4.5 AEROSOL RESPIRATORY (INHALATION) at 20:45

## 2019-08-26 RX ADMIN — SIMVASTATIN 20 MILLIGRAM(S): 20 TABLET, FILM COATED ORAL at 21:25

## 2019-08-26 RX ADMIN — TAMSULOSIN HYDROCHLORIDE 0.4 MILLIGRAM(S): 0.4 CAPSULE ORAL at 21:26

## 2019-08-26 RX ADMIN — FENTANYL CITRATE 1 PATCH: 50 INJECTION INTRAVENOUS at 22:40

## 2019-08-26 NOTE — CONSULT NOTE ADULT - SUBJECTIVE AND OBJECTIVE BOX
79yM was admitted on 08-23 with increased fatigue and progressive weakness to the point that he is no longer able to care for himself. Patient has history of lung CA s/p RT in 1996 with now mets to bones. He is currently receiving weekly infusions at [facility]. Patient was most recently injured with a right pathological humeral fracture on 7/2 and was s/p ORIF. Course was complicated by anemia s/p PRBCs.     Imaging showed (ind reviewed):  HEAD CT - No acute findings  Unable to have MRI due to metal    Patient reports he is depressed about the condition he is in. His wife has given up and cannot help anymore. he understands that he has been progressively worsening and realizes it is hard to keep going. Provided emotional support and asissted with meal prep this AM.     REVIEW OF SYSTEMS  Constitutional - No fever, No weight loss, +fatigue  HEENT - No eye pain, No visual disturbances, No difficulty hearing, No tinnitus, No vertigo, No neck pain  Respiratory - No cough, No wheezing, No shortness of breath  Cardiovascular - No chest pain, No palpitations  Gastrointestinal - No abdominal pain, No nausea, No vomiting, No diarrhea, No constipation  Genitourinary - No dysuria, No frequency, No hematuria, No incontinence  Neurological - No headaches, No memory loss, +loss of strength, No numbness, No tremors  Skin - No itching, No rashes, No lesions   Endocrine - No temperature intolerance  Musculoskeletal - +joint pain, No joint swelling, +muscle pain  Psychiatric - +depression, No anxiety    VITALS  T(C): 37.3 (08-26-19 @ 08:04), Max: 37.3 (08-25-19 @ 20:08)  HR: 76 (08-26-19 @ 08:04) (73 - 82)  BP: 107/63 (08-26-19 @ 08:04) (96/54 - 122/61)  RR: 18 (08-26-19 @ 08:04) (17 - 18)  SpO2: 92% (08-26-19 @ 08:04) (91% - 96%)  Wt(kg): --    PAST MEDICAL & SURGICAL HISTORY  Anxiety  Constricted pupils  Hypertension  Sleep apnea  High cholesterol  Nausea alone  Fever and chills  Pneumonia symptoms  Anxiety  ETOH abuse  Vocal cord cancer  Hypothyroid  Diabetes  Cough  Lung nodule  Cancer of vocal cord  S/P cholecystectomy  S/P knee replacement      SOCIAL HISTORY  Smoking - Denied  EtOH - Denied   Drugs - Denied    FUNCTIONAL HISTORY  Lives with wife, 3 YULIANA  Independent with [facility] -- but this progressively declined over weeks/month    CURRENT FUNCTIONAL STATUS  8/22  Bed Mobility: Rolling/Turning:     · Level of [place]	supervision	    Bed Mobility: Scooting/Bridging:     · Level of [place]	supervision	    Bed Mobility: Sit to Supine:     · Level of [place]	moderate assist (50% patients effort)	  · Physical Assist/Nonphysical Assist	1 person assist	    Bed Mobility: Supine to Sit:     · Level of [place]	moderate assist (50% patients effort)	  · Physical Assist/Nonphysical Assist	1 person assist	    Bed Mobility Analysis:     · Bed Mobility Limitations	decreased ability to use legs for bridging/pushing; impaired ability to control trunk for mobility	  · Impairments Contributing to Impaired Bed Mobility	impaired balance; impaired postural control; decreased strength; pain	    Transfer: Sit to Stand:     · Level of [place]	minimum assist (75% patients effort)	  · Physical Assist/Nonphysical Assist	1 person assist	  · Weight-Bearing Restrictions	nonweight-bearing; RUE	  · Assistive Device	HHA	    Transfer: Stand to Sit:     · Level of [place]	minimum assist (75% patients effort)	  · Physical Assist/Nonphysical Assist	1 person assist	  · Weight-Bearing Restrictions	nonweight-bearing; RUE	  · Assistive Device	HHA	    Sit/Stand Transfer Safety Analysis:     · Transfer Safety Concerns Noted	decreased proprioception; decreased balance during turns; decreased sequencing ability; decreased weight-shifting ability	  · Impairments Contributing to Impaired Transfers	impaired postural control; decreased strength; ataxic; impaired balance	    Gait Skills:     · Level of [place]	minimum assist (75% patients effort)	  · Physical Assist/Nonphysical Assist	1 person assist	  · Weight-Bearing Restrictions	nonweight-bearing; RUE	  · Assistive Device	HHA	  · Gait Distance	10 feet	      FAMILY HISTORY   Family history of esophageal cancer  Family history of liver disease  No pertinent family history in first degree relatives      RECENT LABS/IMAGING  CBC Full  -  ( 26 Aug 2019 06:28 )  WBC Count : 13.38 K/uL  RBC Count : 3.61 M/uL  Hemoglobin : 9.7 g/dL  Hematocrit : 31.1 %  Platelet Count - Automated : 340 K/uL  Mean Cell Volume : 86.1 fl  Mean Cell Hemoglobin : 26.9 pg  Mean Cell Hemoglobin Concentration : 31.2 gm/dL  Auto Neutrophil # : x  Auto Lymphocyte # : x  Auto Monocyte # : x  Auto Eosinophil # : x  Auto Basophil # : x  Auto Neutrophil % : x  Auto Lymphocyte % : x  Auto Monocyte % : x  Auto Eosinophil % : x  Auto Basophil % : x    08-25    132<L>  |  97<L>  |  24.0<H>  ----------------------------<  204<H>  4.6   |  24.0  |  1.16    Ca    7.9<L>      25 Aug 2019 06:54          ALLERGIES  No Known Allergies      MEDICATIONS   ALBUTerol    90 MICROgram(s) HFA Inhaler 2 Puff(s) Inhalation every 6 hours PRN  ALBUTerol/ipratropium for Nebulization 3 milliLiter(s) Nebulizer every 4 hours PRN  ALPRAZolam 0.5 milliGRAM(s) Oral three times a day PRN  bisacodyl 5 milliGRAM(s) Oral daily  buDESOnide 160 MICROgram(s)/formoterol 4.5 MICROgram(s) Inhaler 2 Puff(s) Inhalation two times a day  calcitriol   Capsule 0.25 MICROGram(s) Oral daily  cyanocobalamin Injectable 1000 MICROGram(s) SubCutaneous daily  dextrose 40% Gel 15 Gram(s) Oral once PRN  dextrose 5%. 1000 milliLiter(s) IV Continuous <Continuous>  dextrose 50% Injectable 12.5 Gram(s) IV Push once  dextrose 50% Injectable 25 Gram(s) IV Push once  dextrose 50% Injectable 25 Gram(s) IV Push once  famotidine    Tablet 20 milliGRAM(s) Oral daily  fentaNYL   Patch  75 MICROgram(s)/Hr. 1 Patch Transdermal every 48 hours  ferrous sulfate Oral Tab/Cap - Peds 325 milliGRAM(s) Oral two times a day with meals  FLUoxetine 20 milliGRAM(s) Oral daily  folic acid 1 milliGRAM(s) Oral daily  furosemide    Tablet 20 milliGRAM(s) Oral daily  gabapentin 800 milliGRAM(s) Oral three times a day  glucagon  Injectable 1 milliGRAM(s) IntraMuscular once PRN  heparin  Injectable 5000 Unit(s) SubCutaneous every 8 hours  insulin glargine Injectable (LANTUS) 25 Unit(s) SubCutaneous at bedtime  insulin lispro (HumaLOG) corrective regimen sliding scale   SubCutaneous three times a day before meals  levothyroxine 125 MICROGram(s) Oral daily  oxyCODONE    IR 5 milliGRAM(s) Oral every 4 hours PRN  oxyCODONE    IR 10 milliGRAM(s) Oral every 4 hours PRN  prochlorperazine   Tablet 10 milliGRAM(s) Oral every 8 hours PRN  simvastatin 20 milliGRAM(s) Oral at bedtime  tamsulosin 0.4 milliGRAM(s) Oral at bedtime  tiotropium 18 MICROgram(s) Capsule 1 Capsule(s) Inhalation daily  verapamil 40 milliGRAM(s) Oral daily      ----------------------------------------------------------------------------------------  PHYSICAL EXAM  Constitutional - NAD, Comfortable  HEENT - NCAT, EOMI  Neck - Supple, No limited ROM  Chest - Breathing comfortably, No wheezing  Cardiovascular - S1S2   Abdomen - Soft   Extremities - Right UE weakness related to pain in sling  Neurologic Exam -                    Cognitive - Awake, Alert, AAO to self, place, date, year, situation     Communication - Fluent, No dysarthria     Motor - Diffuse deficits to the BLE and right UE related to pain                    LEFT    UE - ShAB 4/5, EF 4/5, EE 4/5,  5/5                    RIGHT UE - ShAB -/5, EF 1/5, EE 1/5,  5/5                    LEFT    LE - HF 1/5, KE 1/5, DF 5/5                     RIGHT LE - HF 1/5, KE 1/5, DF 5/5      Sensory - Intact to LT  Psychiatric - Mood depressed	  ----------------------------------------------------------------------------------------  ASSESSMENT/PLAN  79yMale with functional deficits after progressive debility related to aging with metastatic lung CA  Pulm - Symbicort, Spiriva, Duoneb  DM2 - Lantus, ISS  Pain - Tylenol, Neurontin, Fentanyl  Mood - Xanax, Prozac  DVT PPX - SCDs, heparin  Rehab - Recommend MANUEL, patient DOES NOT meet acute inpatient rehabilitation criteria. Patient would not be able to tolerate 3 hours of intense rehab. In addition, patient is currently receiving weekly transfusions at [facility]. Recommend Palliative Care consult.     Continue bedside therapy as well as OOB throughout the day with mobilization by staff to maintain cardiopulmonary function and prevention of secondary complications related to debility.

## 2019-08-26 NOTE — PROGRESS NOTE ADULT - ASSESSMENT
78 y.o. Male with PMH of HTN, HLD,  Hypothyroidism, COPD/ Empysema, CKD, DMII, vocal cord CA s/p RT 1996, lung cancer s/p chemo and RT, lung cancer metastasized to the right humerus s/p ORIF of right humerus on 7/2/19 presents c/o generalized fatigue and unable to ambulate x several weeks with Symptoms progressively worsening.       >Debility/Unable to Ambulate/weakness - multifactorial with recent surgery, anemia, atrial tachycardia - PT eval recommending MANUEL or AR, PMR consulted, CT head negative.      >Hypoxia / COPD / Emphysema - O2, BDs, no evidence of PE, added symbicort/spiriv    >atrial tach: verapamil    >lung CA: discussed with Dr. Deng - only palliative immunotx with aptivo, can be held while in rehab, life expectancy 6-12 months    >Hyperlipidemia - diet modification.  Continue Statin.    >Essential HTN - Monitor BP.  Continue oral antihypertensives.    > Acquired Hypothyroidism - continue Synthroid, TSH mildly elevated, cont current dose    > Diabetes Type II - with hyperglycemia, lantus adjusted with mild improvement, will increase more, cont HISS    > Anemia - b12/folate/iron deficiency--started all supplements   - transfused 1 unit PRBCs due to symptomatic anemia with improved symptoms and HH    > DVT Prophylaxis - Heparin subcut.

## 2019-08-26 NOTE — DISCHARGE NOTE PROVIDER - NSDCFUSCHEDAPPT_GEN_ALL_CORE_FT
KARLI UGARTE ; 09/05/2019 ; NPP [facility]  KARLI UGARTE ; 09/05/2019 ; [facility]  KARLI UGARTE ; 09/11/2019 ; NPP [facility]  KARLI UGARTE ; 09/16/2019 ; NPP Intmed [address]  KARLI UGARTE ; 09/17/2019 ; [facility]PP Neuro [address] [facility]  KARLI UGARTE ; 09/18/2019 ; [facility]  AURELIORANKARLI ROGERS ; 10/02/2019 ; NPP [facility]  AURELIORANCOKARLI ; 10/25/2019 ; NPP Rad Med [address]   KARLI UGARTE ; 11/19/2019 ; NPP OrthoSurg [address] KARLI UGARTE ; 09/05/2019 ; NPP [facility]  KARLI UGARTE ; 09/05/2019 ; [facility]  KARLI UGARTE ; 09/11/2019 ; NPP [facility]  KARLI UGARTE ; 09/16/2019 ; NPP Intmed [address]  KARLI UGARTE ; 09/17/2019 ; [facility]PP Neuro [address] [facility] [address] [facility]  KARLI UGARTE ; 09/18/2019 ; [facility]PP [facility]  AURELIORANKARLI ROGERS ; 10/02/2019 ; [facility]  AURELIORANCOKARLI ; 10/25/2019 ; NPP Rad Med [address]   KARLI UGARTE ; 11/19/2019 ; NPP OrthoSurg [address] KARLI UGARTE ; 09/05/2019 ; NPP [facility]  KARLI UGARTE ; 09/05/2019 ; [facility]  KARLI UGARTE ; 09/11/2019 ; NPP [facility]  KARLI UGARTE ; 09/16/2019 ; NPP Intmed [address]  KARLI UGARTE ; 09/17/2019 ; [facility]PP Neuro [address] [facility]  KARLI UGARTE ; 09/18/2019 ; NPP [facility]  AURELIORANKARLI ROGERS ; 10/02/2019 ; NPP [facility]  AURELIORANCOKARLI ; 10/25/2019 ; NPP Rad Med [address]   KARLI UGARTE ; 11/19/2019 ; NPP OrthoSurg [address]

## 2019-08-26 NOTE — DISCHARGE NOTE PROVIDER - HOSPITAL COURSE
78 y.o. Male with PMH of HTN, HLD,  Hypothyroidism, COPD/ Empysema, CKD, DMII, vocal cord CA s/p RT 1996, lung cancer s/p chemo and RT, lung cancer metastasized to the right humerus s/p ORIF of right humerus on 7/2/19 presents c/o generalized fatigue and unable to ambulate x several weeks with Symptoms progressively worsening.     Pt was evaluated with PT and MANUEL recommended. Found to have multifactorial anemia and had 1 unit of PRBCs transfused with improved HH and strength.    Awaiting DC to rehab.            >Debility/Unable to Ambulate/weakness - multifactorial with recent surgery, anemia, atrial tachycardia - PT eval recommending MANUEL, PMR consulted, CT head negative.          >Hypoxia / COPD / Emphysema - O2, BDs, no evidence of PE, added symbicort/spiriva        >atrial tach: verapamil        >lung CA: discussed with Dr. Deng - only palliative immunotx with aptivo, can be held while in rehab, life expectancy 6-12 months        >Hyperlipidemia - diet modification.  Continue Statin.        >Essential HTN - Monitor BP.  Continue oral antihypertensives.        > Acquired Hypothyroidism - continue Synthroid, TSH mildly elevated, cont current dose        > Diabetes Type II - with hyperglycemia, lantus adjusted with mild improvement, will increase more, cont HISS and premeal coverage         > Anemia - b12/folate/iron deficiency--started all supplements     - transfused 1 unit PRBCs due to symptomatic anemia with improved symptoms and HH 78 y.o. Male with PMH of HTN, HLD,  Hypothyroidism, COPD/ Empysema, CKD, DMII, vocal cord CA s/p RT 1996, lung cancer s/p chemo and RT, lung cancer metastasized to the right humerus s/p ORIF of right humerus on 7/2/19 presents c/o generalized fatigue and unable to ambulate x several weeks with Symptoms progressively worsening.     Pt was evaluated with PT and MANUEL recommended. Found to have multifactorial anemia and had 1 unit of PRBCs transfused with improved HH and strength.    Awaiting DC to rehab.        # Debility/Unable to Ambulate/weakness     - multifactorial with recent surgery, anemia, atrial tachycardia     - PT eval recommending MANUEL    - PMR consult appreciated- MANUEL        # Hypoxia / COPD / Emphysema     - O2, BDs, no evidence of PE    - symbicort/spiriv        # atrial tach    - stable    - verapamil        # lung CA    - previously d/w Dr Deng by other hospitalists during admit - only palliative immunotx with aptivo, can be held while in rehab, life expectancy 6-12 months        # Hyperlipidemia     - diet modification    - statin        # Essential HTN     - monitor bp    - verapamil and Lasix         # Acquired Hypothyroidism     - Synthroid    - follow up with pmd outpatient as tsh mild elevation on 8/25 5.19         # Diabetes Type II - with hyperglycemia    - lantus    - iss    - monitor blood sugars         # Anemia - b12/folate/iron deficiency    - being supplemented      - transfused 1 unit PRBCs due to symptomatic anemia with improved symptoms and HH        # DVT Prophylaxis     - Heparin sc

## 2019-08-26 NOTE — DISCHARGE NOTE PROVIDER - CARE PROVIDER_API CALL
Leonel Deng)  Hematology; Internal Medicine; Medical Oncology  [address] [place] [address]  Phone: [phone]  Fax: [phone]  Follow Up Time:

## 2019-08-26 NOTE — DISCHARGE NOTE PROVIDER - NSDCCPCAREPLAN_GEN_ALL_CORE_FT
PRINCIPAL DISCHARGE DIAGNOSIS  Diagnosis: Weakness  Assessment and Plan of Treatment: PRINCIPAL DISCHARGE DIAGNOSIS  Diagnosis: Weakness  Assessment and Plan of Treatment: - take medications as rx  - follow up with pmd and heme/onc  - follow up tsh 6 weeks post d/c from hospital

## 2019-08-26 NOTE — PROGRESS NOTE ADULT - SUBJECTIVE AND OBJECTIVE BOX
CC: Fatigue (24 Aug 2019 12:45)    HPI: 78 y.o. Male with PMH of hypertension, hyperlipidemia, hypothyroidism, COPD/ Empysema, CKD, DM2, vocal cord CA s/p RT 1996, lung cancer s/p chemo and RT, lung cancer metastasized to the right humerus s/p ORIF of right humerus on 7/2/19 presents c/o generalized fatigue and unable to ambulate x several weeks.  Symptoms progressively worsening.  No focal weakness / numbness.  States he has been unable to mobilize with wife at home.  Noted hypoxic with SaO2 89% on RA.  Episodic atrial tachycardia in ED.  Denies cough / fever / chills.  No additional complaints.     INTERVAL HPI/OVERNIGHT EVENTS: + weakness, unable to stand on his own, + BLAKELY/SOB, feels stronger today, tolerated transfusion  Other ROS reviewed and neg     Vital Signs Last 24 Hrs  T(C): 37.3 (26 Aug 2019 08:04), Max: 37.3 (25 Aug 2019 20:08)  T(F): 99.1 (26 Aug 2019 08:04), Max: 99.1 (25 Aug 2019 20:08)  HR: 80 (26 Aug 2019 09:49) (73 - 82)  BP: 107/63 (26 Aug 2019 08:04) (107/63 - 122/61)  RR: 18 (26 Aug 2019 08:04) (17 - 18)  SpO2: 95% (26 Aug 2019 09:49) (91% - 96%)                        9.7    13.38 )-----------( 340      ( 26 Aug 2019 06:28 )             31.1     25 Aug 2019 06:54    132    |  97     |  24.0   ----------------------------<  204    4.6     |  24.0   |  1.16     Ca    7.9        25 Aug 2019 06:54    POCT Blood Glucose.: 203 mg/dL (26 Aug 2019 11:34)  POCT Blood Glucose.: 216 mg/dL (26 Aug 2019 07:37)  POCT Blood Glucose.: 197 mg/dL (25 Aug 2019 21:32)  POCT Blood Glucose.: 204 mg/dL (25 Aug 2019 16:40)    Hemoglobin A1C, Whole Blood: 5.8 % (08-25-19 @ 06:54)    MEDICATIONS  (STANDING):  bisacodyl 5 milliGRAM(s) Oral daily  buDESOnide 160 MICROgram(s)/formoterol 4.5 MICROgram(s) Inhaler 2 Puff(s) Inhalation two times a day  calcitriol   Capsule 0.25 MICROGram(s) Oral daily  cyanocobalamin Injectable 1000 MICROGram(s) SubCutaneous daily  dextrose 5%. 1000 milliLiter(s) (50 mL/Hr) IV Continuous <Continuous>  dextrose 50% Injectable 12.5 Gram(s) IV Push once  dextrose 50% Injectable 25 Gram(s) IV Push once  dextrose 50% Injectable 25 Gram(s) IV Push once  famotidine    Tablet 20 milliGRAM(s) Oral daily  fentaNYL   Patch  75 MICROgram(s)/Hr. 1 Patch Transdermal every 48 hours  ferrous sulfate Oral Tab/Cap - Peds 325 milliGRAM(s) Oral two times a day with meals  FLUoxetine 20 milliGRAM(s) Oral daily  folic acid 1 milliGRAM(s) Oral daily  furosemide    Tablet 20 milliGRAM(s) Oral daily  gabapentin 800 milliGRAM(s) Oral three times a day  heparin  Injectable 5000 Unit(s) SubCutaneous every 8 hours  insulin glargine Injectable (LANTUS) 25 Unit(s) SubCutaneous at bedtime  insulin lispro (HumaLOG) corrective regimen sliding scale   SubCutaneous three times a day before meals  levothyroxine 125 MICROGram(s) Oral daily  simvastatin 20 milliGRAM(s) Oral at bedtime  tamsulosin 0.4 milliGRAM(s) Oral at bedtime  tiotropium 18 MICROgram(s) Capsule 1 Capsule(s) Inhalation daily  verapamil 40 milliGRAM(s) Oral daily    MEDICATIONS  (PRN):  ALBUTerol    90 MICROgram(s) HFA Inhaler 2 Puff(s) Inhalation every 6 hours PRN Wheezing  ALBUTerol/ipratropium for Nebulization 3 milliLiter(s) Nebulizer every 4 hours PRN Shortness of Breath and/or Wheezing  ALPRAZolam 0.5 milliGRAM(s) Oral three times a day PRN anxiety  dextrose 40% Gel 15 Gram(s) Oral once PRN Blood Glucose LESS THAN 70 milliGRAM(s)/deciliter  glucagon  Injectable 1 milliGRAM(s) IntraMuscular once PRN Glucose LESS THAN 70 milligrams/deciliter  oxyCODONE    IR 5 milliGRAM(s) Oral every 4 hours PRN mild - moderate pain  oxyCODONE    IR 10 milliGRAM(s) Oral every 4 hours PRN Severe Pain (7 - 10)  prochlorperazine   Tablet 10 milliGRAM(s) Oral every 8 hours PRN nausea    RADIOLOGY & ADDITIONAL TESTS: personally visualized    PHYSICAL EXAM:    General: elderly debilitated male in no acute distress  Eyes: PERRLA, EOMI; conjunctiva and sclera clear  Head: Normocephalic; atraumatic  ENMT: No nasal discharge; airway clear  Neck: Supple; non tender; no masses  Respiratory: No wheezes, rales or rhonchi, decreased BS at bases BL  Cardiovascular: Regular rate and rhythm. S1 and S2   Gastrointestinal: Soft non-tender non-distended; Normal bowel sounds  Genitourinary: No costovertebral angle tenderness  Extremities: No clubbing, cyanosis or edema, RUE in splint  Vascular: Peripheral pulses palpable 2+ bilaterally  Neurological: Alert and oriented x4  Skin: Warm and dry. Pale  Musculoskeletal: Normal tone, without deformities  Psychiatric: Cooperative and appropriate

## 2019-08-27 ENCOUNTER — APPOINTMENT (OUTPATIENT)
Age: 79
End: 2019-08-27

## 2019-08-27 LAB
GLUCOSE BLDC GLUCOMTR-MCNC: 222 MG/DL — HIGH (ref 70–99)
GLUCOSE BLDC GLUCOMTR-MCNC: 226 MG/DL — HIGH (ref 70–99)
GLUCOSE BLDC GLUCOMTR-MCNC: 227 MG/DL — HIGH (ref 70–99)
GLUCOSE BLDC GLUCOMTR-MCNC: 245 MG/DL — HIGH (ref 70–99)
HCT VFR BLD CALC: 29.6 % — LOW (ref 39–50)
HGB BLD-MCNC: 9.2 G/DL — LOW (ref 13–17)
MCHC RBC-ENTMCNC: 26.8 PG — LOW (ref 27–34)
MCHC RBC-ENTMCNC: 31.1 GM/DL — LOW (ref 32–36)
MCV RBC AUTO: 86.3 FL — SIGNIFICANT CHANGE UP (ref 80–100)
PLATELET # BLD AUTO: 328 K/UL — SIGNIFICANT CHANGE UP (ref 150–400)
RBC # BLD: 3.43 M/UL — LOW (ref 4.2–5.8)
RBC # FLD: 18.1 % — HIGH (ref 10.3–14.5)
WBC # BLD: 12.37 K/UL — HIGH (ref 3.8–10.5)
WBC # FLD AUTO: 12.37 K/UL — HIGH (ref 3.8–10.5)

## 2019-08-27 PROCEDURE — 99232 SBSQ HOSP IP/OBS MODERATE 35: CPT

## 2019-08-27 RX ADMIN — OXYCODONE HYDROCHLORIDE 10 MILLIGRAM(S): 5 TABLET ORAL at 13:24

## 2019-08-27 RX ADMIN — GABAPENTIN 800 MILLIGRAM(S): 400 CAPSULE ORAL at 22:44

## 2019-08-27 RX ADMIN — Medication 4: at 12:25

## 2019-08-27 RX ADMIN — Medication 20 MILLIGRAM(S): at 06:07

## 2019-08-27 RX ADMIN — TAMSULOSIN HYDROCHLORIDE 0.4 MILLIGRAM(S): 0.4 CAPSULE ORAL at 22:44

## 2019-08-27 RX ADMIN — Medication 125 MICROGRAM(S): at 06:07

## 2019-08-27 RX ADMIN — Medication 20 MILLIGRAM(S): at 11:36

## 2019-08-27 RX ADMIN — OXYCODONE HYDROCHLORIDE 10 MILLIGRAM(S): 5 TABLET ORAL at 14:24

## 2019-08-27 RX ADMIN — HEPARIN SODIUM 5000 UNIT(S): 5000 INJECTION INTRAVENOUS; SUBCUTANEOUS at 06:06

## 2019-08-27 RX ADMIN — OXYCODONE HYDROCHLORIDE 10 MILLIGRAM(S): 5 TABLET ORAL at 06:06

## 2019-08-27 RX ADMIN — Medication 5 MILLIGRAM(S): at 11:36

## 2019-08-27 RX ADMIN — TIOTROPIUM BROMIDE 1 CAPSULE(S): 18 CAPSULE ORAL; RESPIRATORY (INHALATION) at 09:20

## 2019-08-27 RX ADMIN — Medication 4: at 08:28

## 2019-08-27 RX ADMIN — Medication 1 MILLIGRAM(S): at 11:36

## 2019-08-27 RX ADMIN — Medication 0.5 MILLIGRAM(S): at 11:40

## 2019-08-27 RX ADMIN — BUDESONIDE AND FORMOTEROL FUMARATE DIHYDRATE 2 PUFF(S): 160; 4.5 AEROSOL RESPIRATORY (INHALATION) at 09:20

## 2019-08-27 RX ADMIN — GABAPENTIN 800 MILLIGRAM(S): 400 CAPSULE ORAL at 06:06

## 2019-08-27 RX ADMIN — Medication 325 MILLIGRAM(S): at 08:28

## 2019-08-27 RX ADMIN — FAMOTIDINE 20 MILLIGRAM(S): 10 INJECTION INTRAVENOUS at 11:36

## 2019-08-27 RX ADMIN — BUDESONIDE AND FORMOTEROL FUMARATE DIHYDRATE 2 PUFF(S): 160; 4.5 AEROSOL RESPIRATORY (INHALATION) at 20:58

## 2019-08-27 RX ADMIN — CALCITRIOL 0.25 MICROGRAM(S): 0.5 CAPSULE ORAL at 11:36

## 2019-08-27 RX ADMIN — FENTANYL CITRATE 1 PATCH: 50 INJECTION INTRAVENOUS at 00:12

## 2019-08-27 RX ADMIN — FENTANYL CITRATE 1 PATCH: 50 INJECTION INTRAVENOUS at 13:53

## 2019-08-27 RX ADMIN — INSULIN GLARGINE 30 UNIT(S): 100 INJECTION, SOLUTION SUBCUTANEOUS at 22:44

## 2019-08-27 RX ADMIN — OXYCODONE HYDROCHLORIDE 10 MILLIGRAM(S): 5 TABLET ORAL at 23:30

## 2019-08-27 RX ADMIN — Medication 4: at 17:34

## 2019-08-27 RX ADMIN — HEPARIN SODIUM 5000 UNIT(S): 5000 INJECTION INTRAVENOUS; SUBCUTANEOUS at 22:44

## 2019-08-27 RX ADMIN — FENTANYL CITRATE 1 PATCH: 50 INJECTION INTRAVENOUS at 20:33

## 2019-08-27 RX ADMIN — OXYCODONE HYDROCHLORIDE 10 MILLIGRAM(S): 5 TABLET ORAL at 22:42

## 2019-08-27 RX ADMIN — Medication 40 MILLIGRAM(S): at 06:06

## 2019-08-27 RX ADMIN — HEPARIN SODIUM 5000 UNIT(S): 5000 INJECTION INTRAVENOUS; SUBCUTANEOUS at 13:24

## 2019-08-27 RX ADMIN — SIMVASTATIN 20 MILLIGRAM(S): 20 TABLET, FILM COATED ORAL at 22:44

## 2019-08-27 RX ADMIN — OXYCODONE HYDROCHLORIDE 10 MILLIGRAM(S): 5 TABLET ORAL at 06:45

## 2019-08-27 RX ADMIN — GABAPENTIN 800 MILLIGRAM(S): 400 CAPSULE ORAL at 13:25

## 2019-08-27 RX ADMIN — Medication 325 MILLIGRAM(S): at 17:34

## 2019-08-27 RX ADMIN — PREGABALIN 1000 MICROGRAM(S): 225 CAPSULE ORAL at 13:51

## 2019-08-27 NOTE — PROGRESS NOTE ADULT - SUBJECTIVE AND OBJECTIVE BOX
Patient in bed, reports he feels weak and cannot move well.  He reports generalized fatigue and debility.  He is frustrated with recovery and feels his body is giving up.  Provided emotional support and encouragement in setting of going to rehab.     REVIEW OF SYSTEMS  Constitutional - No fever,  +fatigue  Neurological - +loss of strength    FUNCTIONAL PROGRESS  8/26  Bed Mobility  Bed Mobility Training Rolling/Turning: verbal cues;  moderate assist (50% patient effort);  2 person assist  Bed Mobility Training Scooting: maximum assist (25% patient effort);  verbal cues;  2 person assist  Bed Mobility Training Sit-to-Supine: maximum assist (25% patient effort);  2 person assist;  verbal cues  Bed Mobility Training Supine-to-Sit: maximum assist (25% patient effort);  2 person assist;  verbal cues  Bed Mobility Training Limitations: decreased ability to use arms for pushing/pulling;  decreased strength;  impaired postural control;  decreased flexibility    Sit-Stand Transfer Training  Transfer Training Sit-to-Stand Transfer: maximum assist (25% patient effort);  verbal cues;  2 person assist;  nonweight-bearing   NWB  R UE  Transfer Training Stand-to-Sit Transfer: maximum assist (25% patient effort);  2 person assist;  verbal cues;  nonweight-bearing   NWB R UE   HHA X 2  Sit-to-Stand Transfer Training Transfer Safety Analysis: decreased sequencing ability;  decreased balance;  decreased weight-shifting ability;  decreased strength;  impaired coordination;  impaired postural control;  pain;  HHA x 2    Gait Training  Gait Training: maximum assist (25% patient effort);  2 person assist;  verbal cues;  nonweight-bearing   NWB RUE   HHA x 2;  10 feet  Gait Analysis: 3-point gait   decreased milly;  decreased step length;  decreased stride length;  decreased strength;  impaired coordination;  pain;  10 feet;  HHA x 2  Gait Number of Times:: x 2      VITALS  T(C): 37 (08-27-19 @ 07:54), Max: 37 (08-27-19 @ 07:54)  HR: 77 (08-27-19 @ 07:54) (74 - 80)  BP: 103/58 (08-27-19 @ 07:54) (103/58 - 112/56)  RR: 18 (08-27-19 @ 07:54) (17 - 18)  SpO2: 91% (08-27-19 @ 07:54) (91% - 95%)  Wt(kg): --    MEDICATIONS   ALBUTerol    90 MICROgram(s) HFA Inhaler 2 Puff(s) every 6 hours PRN  ALBUTerol/ipratropium for Nebulization 3 milliLiter(s) every 4 hours PRN  ALPRAZolam 0.5 milliGRAM(s) three times a day PRN  bisacodyl 5 milliGRAM(s) daily  buDESOnide 160 MICROgram(s)/formoterol 4.5 MICROgram(s) Inhaler 2 Puff(s) two times a day  calcitriol   Capsule 0.25 MICROGram(s) daily  cyanocobalamin Injectable 1000 MICROGram(s) daily  dextrose 40% Gel 15 Gram(s) once PRN  dextrose 5%. 1000 milliLiter(s) <Continuous>  dextrose 50% Injectable 12.5 Gram(s) once  dextrose 50% Injectable 25 Gram(s) once  dextrose 50% Injectable 25 Gram(s) once  famotidine    Tablet 20 milliGRAM(s) daily  fentaNYL   Patch  75 MICROgram(s)/Hr. 1 Patch every 48 hours  ferrous sulfate Oral Tab/Cap - Peds 325 milliGRAM(s) two times a day with meals  FLUoxetine 20 milliGRAM(s) daily  folic acid 1 milliGRAM(s) daily  furosemide    Tablet 20 milliGRAM(s) daily  gabapentin 800 milliGRAM(s) three times a day  glucagon  Injectable 1 milliGRAM(s) once PRN  heparin  Injectable 5000 Unit(s) every 8 hours  insulin glargine Injectable (LANTUS) 30 Unit(s) at bedtime  insulin lispro (HumaLOG) corrective regimen sliding scale   three times a day before meals  levothyroxine 125 MICROGram(s) daily  oxyCODONE    IR 5 milliGRAM(s) every 4 hours PRN  oxyCODONE    IR 10 milliGRAM(s) every 4 hours PRN  prochlorperazine   Tablet 10 milliGRAM(s) every 8 hours PRN  simvastatin 20 milliGRAM(s) at bedtime  tamsulosin 0.4 milliGRAM(s) at bedtime  tiotropium 18 MICROgram(s) Capsule 1 Capsule(s) daily  verapamil 40 milliGRAM(s) daily      RECENT LABS - Reviewed                        9.2    12.37 )-----------( 328      ( 27 Aug 2019 06:45 )             29.6                   ----------------------------------------------------------------------------------------  PHYSICAL EXAM  Constitutional - NAD, Comfortable  HEENT - NCAT, EOMI  Neck - Supple, No limited ROM  Chest - Breathing comfortably, No wheezing  Cardiovascular - S1S2   Abdomen - Soft   Extremities - Right UE weakness related to pain in sling  Neurologic Exam -                    Cognitive - Awake, Alert, AAO to self, place, date, year, situation     Communication - Fluent, No dysarthria     Motor - Diffuse deficits to the BLE and right UE related to pain                    LEFT    UE - ShAB 4/5, EF 4/5, EE 4/5,  5/5                    RIGHT UE - ShAB -/5, EF 1/5, EE 1/5,  5/5                    LEFT    LE - HF 1/5, KE 1/5, DF 5/5                     RIGHT LE - HF 1/5, KE 1/5, DF 5/5      Sensory - Intact to LT  Psychiatric - Mood depressed	  ----------------------------------------------------------------------------------------  ASSESSMENT/PLAN  79yMale with functional deficits after progressive debility related to aging with metastatic lung CA  Pulm - Symbicort, Spiriva, Duoneb  DM2 - Lantus, ISS  Pain - Tylenol, Neurontin, Fentanyl  Mood - Xanax, Prozac  DVT PPX - SCDs, heparin  Rehab - Recommend MANUEL, patient DOES NOT meet acute inpatient rehabilitation criteria. Patient would not be able to tolerate 3 hours of intense rehab. In addition, patient is currently receiving weekly transfusions at [facility]. Recommend Palliative Care consult.     Continue bedside therapy as well as OOB throughout the day with mobilization by staff to maintain cardiopulmonary function and prevention of secondary complications related to debility. 	    Will sign off, please reconsult for additional rehab dispo needs if functional status changes.

## 2019-08-27 NOTE — PROGRESS NOTE ADULT - ASSESSMENT
78 y.o. Male with PMH of HTN, HLD,  Hypothyroidism, COPD/ Empysema, CKD, DMII, vocal cord CA s/p RT 1996, lung cancer s/p chemo and RT, lung cancer metastasized to the right humerus s/p ORIF of right humerus on 7/2/19 presents c/o generalized fatigue and unable to ambulate x several weeks with Symptoms progressively worsening.     # Debility/Unable to Ambulate/weakness   - multifactorial with recent surgery, anemia, atrial tachycardia   - PT eval recommending MANUEL  - PMR consult appreciated- MANUEL    # Hypoxia / COPD / Emphysema   - O2, BDs, no evidence of PE  - symbicort/spiriv    # atrial tach  - stable  - verapamil    # lung CA  - previously d/w Dr Deng by other hospitalists during admit - only palliative immunotx with aptivo, can be held while in rehab, life expectancy 6-12 months    # Hyperlipidemia   - diet modification  - statin    # Essential HTN   - monitor bp  - verapamil and Lasix     # Acquired Hypothyroidism   - Synthroid  - follow up with pmd outpatient as tsh mild elevation on 8/25 5.19     # Diabetes Type II - with hyperglycemia  - lantus  - iss  - monitor blood sugars     # Anemia - b12/folate/iron deficiency  - being supplemented    - transfused 1 unit PRBCs due to symptomatic anemia with improved symptoms and HH    # DVT Prophylaxis   - Heparin sc

## 2019-08-27 NOTE — PROGRESS NOTE ADULT - SUBJECTIVE AND OBJECTIVE BOX
Patient is a 79y old  Male who presents with a chief complaint of Fatigue (27 Aug 2019 10:23)      Patient seen and examined at bedside. No overnight events reported.     ALLERGIES:  No Known Allergies    MEDICATIONS  (STANDING):  bisacodyl 5 milliGRAM(s) Oral daily  buDESOnide 160 MICROgram(s)/formoterol 4.5 MICROgram(s) Inhaler 2 Puff(s) Inhalation two times a day  calcitriol   Capsule 0.25 MICROGram(s) Oral daily  cyanocobalamin Injectable 1000 MICROGram(s) SubCutaneous daily  dextrose 5%. 1000 milliLiter(s) (50 mL/Hr) IV Continuous <Continuous>  dextrose 50% Injectable 12.5 Gram(s) IV Push once  dextrose 50% Injectable 25 Gram(s) IV Push once  dextrose 50% Injectable 25 Gram(s) IV Push once  famotidine    Tablet 20 milliGRAM(s) Oral daily  fentaNYL   Patch  75 MICROgram(s)/Hr. 1 Patch Transdermal every 48 hours  ferrous sulfate Oral Tab/Cap - Peds 325 milliGRAM(s) Oral two times a day with meals  FLUoxetine 20 milliGRAM(s) Oral daily  folic acid 1 milliGRAM(s) Oral daily  furosemide    Tablet 20 milliGRAM(s) Oral daily  gabapentin 800 milliGRAM(s) Oral three times a day  heparin  Injectable 5000 Unit(s) SubCutaneous every 8 hours  insulin glargine Injectable (LANTUS) 30 Unit(s) SubCutaneous at bedtime  insulin lispro (HumaLOG) corrective regimen sliding scale   SubCutaneous three times a day before meals  levothyroxine 125 MICROGram(s) Oral daily  simvastatin 20 milliGRAM(s) Oral at bedtime  tamsulosin 0.4 milliGRAM(s) Oral at bedtime  tiotropium 18 MICROgram(s) Capsule 1 Capsule(s) Inhalation daily  verapamil 40 milliGRAM(s) Oral daily    MEDICATIONS  (PRN):  ALBUTerol    90 MICROgram(s) HFA Inhaler 2 Puff(s) Inhalation every 6 hours PRN Wheezing  ALBUTerol/ipratropium for Nebulization 3 milliLiter(s) Nebulizer every 4 hours PRN Shortness of Breath and/or Wheezing  ALPRAZolam 0.5 milliGRAM(s) Oral three times a day PRN anxiety  dextrose 40% Gel 15 Gram(s) Oral once PRN Blood Glucose LESS THAN 70 milliGRAM(s)/deciliter  glucagon  Injectable 1 milliGRAM(s) IntraMuscular once PRN Glucose LESS THAN 70 milligrams/deciliter  oxyCODONE    IR 5 milliGRAM(s) Oral every 4 hours PRN mild - moderate pain  oxyCODONE    IR 10 milliGRAM(s) Oral every 4 hours PRN Severe Pain (7 - 10)  prochlorperazine   Tablet 10 milliGRAM(s) Oral every 8 hours PRN nausea    Vital Signs Last 24 Hrs  T(F): 98.6 (27 Aug 2019 07:54), Max: 98.6 (27 Aug 2019 07:54)  HR: 75 (27 Aug 2019 09:20) (74 - 80)  BP: 103/58 (27 Aug 2019 07:54) (103/58 - 112/56)  RR: 18 (27 Aug 2019 07:54) (17 - 18)  SpO2: 90% (27 Aug 2019 09:20) (90% - 95%)  I&O's Summary    26 Aug 2019 07:01  -  27 Aug 2019 07:00  --------------------------------------------------------  IN: 720 mL / OUT: 1700 mL / NET: -980 mL    27 Aug 2019 07:01  -  27 Aug 2019 14:35  --------------------------------------------------------  IN: 360 mL / OUT: 600 mL / NET: -240 mL    PHYSICAL EXAM:  General: NAD, Elderly debilitated  ENT: MMM, no thrush  Neck: Supple, No JVD  Lungs: decreased breath sounds b/l bases  Cardio: +s1/s2  Abdomen: Soft, Nontender, Nondistended; Bowel sounds present  Extremities: No calf tenderness, No pitting edema    LABS:                        9.2    12.37 )-----------( 328      ( 27 Aug 2019 06:45 )             29.6     08-25    132  |  97  |  24.0  ----------------------------<  204  4.6   |  24.0  |  1.16    Ca    7.9      25 Aug 2019 06:54    eGFR if Non African American: 59 mL/min/1.73M2 (08-25-19 @ 06:54)  eGFR if African American: 68 mL/min/1.73M2 (08-25-19 @ 06:54)    Procalcitonin, Serum: 0.31 ng/mL (08-25-19 @ 06:54)    TSH 5.19   TSH with FT4 reflex --  Total T3 --    Glucose  POCT Blood Glucose.: 245 mg/dL (27 Aug 2019 12:22)  POCT Blood Glucose.: 222 mg/dL (27 Aug 2019 07:53)  POCT Blood Glucose.: 252 mg/dL (26 Aug 2019 21:14)  POCT Blood Glucose.: 238 mg/dL (26 Aug 2019 16:57)    08-25 [id number] 5.8  06-26 [id number] 6.6    Culture - Urine (collected 22 Aug 2019 21:12)  Source: .Urine  Final Report (24 Aug 2019 14:15):    No growth    RADIOLOGY & ADDITIONAL TESTS:  < from: CT Angio Chest w/ IV Cont (08.23.19 @ 17:33) >  IMPRESSION:   No evidence of acute pulmonary embolism.  Fiducial marker in the anterior right upper lobe with associated nodular   soft tissue density in adjacent airspace disease unchanged from 4/9/2019.   Additional right upper lobe nodular density measuring 0.8 cm is unchanged.  Small bilateral pleural effusions with bibasilar atelectasis. Moderate to   severe centrilobular emphysema.  < end of copied text >    Care Discussed with Consultants/Other Providers:

## 2019-08-28 ENCOUNTER — TRANSCRIPTION ENCOUNTER (OUTPATIENT)
Age: 79
End: 2019-08-28

## 2019-08-28 VITALS
RESPIRATION RATE: 18 BRPM | OXYGEN SATURATION: 94 % | TEMPERATURE: 99 F | DIASTOLIC BLOOD PRESSURE: 58 MMHG | SYSTOLIC BLOOD PRESSURE: 122 MMHG | HEART RATE: 73 BPM

## 2019-08-28 LAB
GLUCOSE BLDC GLUCOMTR-MCNC: 214 MG/DL — HIGH (ref 70–99)
GLUCOSE BLDC GLUCOMTR-MCNC: 217 MG/DL — HIGH (ref 70–99)
HCT VFR BLD CALC: 31.9 % — LOW (ref 39–50)
HGB BLD-MCNC: 10 G/DL — LOW (ref 13–17)
MCHC RBC-ENTMCNC: 26.9 PG — LOW (ref 27–34)
MCHC RBC-ENTMCNC: 31.3 GM/DL — LOW (ref 32–36)
MCV RBC AUTO: 85.8 FL — SIGNIFICANT CHANGE UP (ref 80–100)
PLATELET # BLD AUTO: 334 K/UL — SIGNIFICANT CHANGE UP (ref 150–400)
RBC # BLD: 3.72 M/UL — LOW (ref 4.2–5.8)
RBC # FLD: 18.1 % — HIGH (ref 10.3–14.5)
WBC # BLD: 14.49 K/UL — HIGH (ref 3.8–10.5)
WBC # FLD AUTO: 14.49 K/UL — HIGH (ref 3.8–10.5)

## 2019-08-28 PROCEDURE — 84466 ASSAY OF TRANSFERRIN: CPT

## 2019-08-28 PROCEDURE — 82272 OCCULT BLD FECES 1-3 TESTS: CPT

## 2019-08-28 PROCEDURE — 70450 CT HEAD/BRAIN W/O DYE: CPT

## 2019-08-28 PROCEDURE — 83036 HEMOGLOBIN GLYCOSYLATED A1C: CPT

## 2019-08-28 PROCEDURE — 86901 BLOOD TYPING SEROLOGIC RH(D): CPT

## 2019-08-28 PROCEDURE — 84145 PROCALCITONIN (PCT): CPT

## 2019-08-28 PROCEDURE — 94760 N-INVAS EAR/PLS OXIMETRY 1: CPT

## 2019-08-28 PROCEDURE — 85045 AUTOMATED RETICULOCYTE COUNT: CPT

## 2019-08-28 PROCEDURE — 83550 IRON BINDING TEST: CPT

## 2019-08-28 PROCEDURE — 84443 ASSAY THYROID STIM HORMONE: CPT

## 2019-08-28 PROCEDURE — 99239 HOSP IP/OBS DSCHRG MGMT >30: CPT

## 2019-08-28 PROCEDURE — 93005 ELECTROCARDIOGRAM TRACING: CPT

## 2019-08-28 PROCEDURE — 80048 BASIC METABOLIC PNL TOTAL CA: CPT

## 2019-08-28 PROCEDURE — 71045 X-RAY EXAM CHEST 1 VIEW: CPT

## 2019-08-28 PROCEDURE — 94640 AIRWAY INHALATION TREATMENT: CPT

## 2019-08-28 PROCEDURE — 86850 RBC ANTIBODY SCREEN: CPT

## 2019-08-28 PROCEDURE — 86923 COMPATIBILITY TEST ELECTRIC: CPT

## 2019-08-28 PROCEDURE — 83540 ASSAY OF IRON: CPT

## 2019-08-28 PROCEDURE — 96360 HYDRATION IV INFUSION INIT: CPT

## 2019-08-28 PROCEDURE — 71275 CT ANGIOGRAPHY CHEST: CPT

## 2019-08-28 PROCEDURE — 36430 TRANSFUSION BLD/BLD COMPNT: CPT

## 2019-08-28 PROCEDURE — 80053 COMPREHEN METABOLIC PANEL: CPT

## 2019-08-28 PROCEDURE — 96361 HYDRATE IV INFUSION ADD-ON: CPT

## 2019-08-28 PROCEDURE — 36415 COLL VENOUS BLD VENIPUNCTURE: CPT

## 2019-08-28 PROCEDURE — 71046 X-RAY EXAM CHEST 2 VIEWS: CPT

## 2019-08-28 PROCEDURE — 86900 BLOOD TYPING SEROLOGIC ABO: CPT

## 2019-08-28 PROCEDURE — 81003 URINALYSIS AUTO W/O SCOPE: CPT

## 2019-08-28 PROCEDURE — G0378: CPT

## 2019-08-28 PROCEDURE — 93970 EXTREMITY STUDY: CPT

## 2019-08-28 PROCEDURE — 82746 ASSAY OF FOLIC ACID SERUM: CPT

## 2019-08-28 PROCEDURE — 99285 EMERGENCY DEPT VISIT HI MDM: CPT | Mod: 25

## 2019-08-28 PROCEDURE — 84484 ASSAY OF TROPONIN QUANT: CPT

## 2019-08-28 PROCEDURE — 82962 GLUCOSE BLOOD TEST: CPT

## 2019-08-28 PROCEDURE — 85027 COMPLETE CBC AUTOMATED: CPT

## 2019-08-28 PROCEDURE — 87086 URINE CULTURE/COLONY COUNT: CPT

## 2019-08-28 PROCEDURE — 71046 X-RAY EXAM CHEST 2 VIEWS: CPT | Mod: 26

## 2019-08-28 PROCEDURE — 82607 VITAMIN B-12: CPT

## 2019-08-28 PROCEDURE — P9016: CPT

## 2019-08-28 PROCEDURE — 83880 ASSAY OF NATRIURETIC PEPTIDE: CPT

## 2019-08-28 PROCEDURE — 82728 ASSAY OF FERRITIN: CPT

## 2019-08-28 RX ADMIN — PREGABALIN 1000 MICROGRAM(S): 225 CAPSULE ORAL at 12:14

## 2019-08-28 RX ADMIN — BUDESONIDE AND FORMOTEROL FUMARATE DIHYDRATE 2 PUFF(S): 160; 4.5 AEROSOL RESPIRATORY (INHALATION) at 09:42

## 2019-08-28 RX ADMIN — Medication 40 MILLIGRAM(S): at 05:58

## 2019-08-28 RX ADMIN — Medication 4: at 12:13

## 2019-08-28 RX ADMIN — Medication 4: at 17:12

## 2019-08-28 RX ADMIN — GABAPENTIN 800 MILLIGRAM(S): 400 CAPSULE ORAL at 13:36

## 2019-08-28 RX ADMIN — Medication 20 MILLIGRAM(S): at 12:14

## 2019-08-28 RX ADMIN — OXYCODONE HYDROCHLORIDE 10 MILLIGRAM(S): 5 TABLET ORAL at 17:19

## 2019-08-28 RX ADMIN — FAMOTIDINE 20 MILLIGRAM(S): 10 INJECTION INTRAVENOUS at 12:14

## 2019-08-28 RX ADMIN — Medication 20 MILLIGRAM(S): at 05:58

## 2019-08-28 RX ADMIN — Medication 1 MILLIGRAM(S): at 12:14

## 2019-08-28 RX ADMIN — Medication 325 MILLIGRAM(S): at 08:33

## 2019-08-28 RX ADMIN — Medication 5 MILLIGRAM(S): at 12:13

## 2019-08-28 RX ADMIN — FENTANYL CITRATE 1 PATCH: 50 INJECTION INTRAVENOUS at 08:30

## 2019-08-28 RX ADMIN — GABAPENTIN 800 MILLIGRAM(S): 400 CAPSULE ORAL at 05:58

## 2019-08-28 RX ADMIN — Medication 4: at 08:33

## 2019-08-28 RX ADMIN — Medication 125 MICROGRAM(S): at 05:58

## 2019-08-28 RX ADMIN — CALCITRIOL 0.25 MICROGRAM(S): 0.5 CAPSULE ORAL at 12:14

## 2019-08-28 RX ADMIN — HEPARIN SODIUM 5000 UNIT(S): 5000 INJECTION INTRAVENOUS; SUBCUTANEOUS at 05:59

## 2019-08-28 RX ADMIN — Medication 325 MILLIGRAM(S): at 17:12

## 2019-08-28 RX ADMIN — TIOTROPIUM BROMIDE 1 CAPSULE(S): 18 CAPSULE ORAL; RESPIRATORY (INHALATION) at 09:42

## 2019-08-28 RX ADMIN — OXYCODONE HYDROCHLORIDE 10 MILLIGRAM(S): 5 TABLET ORAL at 16:21

## 2019-08-28 RX ADMIN — HEPARIN SODIUM 5000 UNIT(S): 5000 INJECTION INTRAVENOUS; SUBCUTANEOUS at 13:36

## 2019-08-28 NOTE — PROGRESS NOTE ADULT - SUBJECTIVE AND OBJECTIVE BOX
Patient is a 79y old  Male who presents with a chief complaint of Fatigue (27 Aug 2019 14:33)      Patient seen and examined at bedside. No overnight events reported. patient stating feeling better today as coughing up more mucous plugs. no hemoptysis. wbc count slightly elevated however afebrile and clinically patient stating feeling better. will d/c to [facility] today. cxr essentially unchanged. from patient clinically status favor bibasilar atelectatic changes     ALLERGIES:  No Known Allergies    MEDICATIONS  (STANDING):  bisacodyl 5 milliGRAM(s) Oral daily  buDESOnide 160 MICROgram(s)/formoterol 4.5 MICROgram(s) Inhaler 2 Puff(s) Inhalation two times a day  calcitriol   Capsule 0.25 MICROGram(s) Oral daily  dextrose 5%. 1000 milliLiter(s) (50 mL/Hr) IV Continuous <Continuous>  dextrose 50% Injectable 12.5 Gram(s) IV Push once  dextrose 50% Injectable 25 Gram(s) IV Push once  dextrose 50% Injectable 25 Gram(s) IV Push once  famotidine    Tablet 20 milliGRAM(s) Oral daily  fentaNYL   Patch  75 MICROgram(s)/Hr. 1 Patch Transdermal every 48 hours  ferrous sulfate Oral Tab/Cap - Peds 325 milliGRAM(s) Oral two times a day with meals  FLUoxetine 20 milliGRAM(s) Oral daily  folic acid 1 milliGRAM(s) Oral daily  furosemide    Tablet 20 milliGRAM(s) Oral daily  gabapentin 800 milliGRAM(s) Oral three times a day  heparin  Injectable 5000 Unit(s) SubCutaneous every 8 hours  insulin glargine Injectable (LANTUS) 30 Unit(s) SubCutaneous at bedtime  insulin lispro (HumaLOG) corrective regimen sliding scale   SubCutaneous three times a day before meals  levothyroxine 125 MICROGram(s) Oral daily  simvastatin 20 milliGRAM(s) Oral at bedtime  tamsulosin 0.4 milliGRAM(s) Oral at bedtime  tiotropium 18 MICROgram(s) Capsule 1 Capsule(s) Inhalation daily  verapamil 40 milliGRAM(s) Oral daily    MEDICATIONS  (PRN):  ALBUTerol    90 MICROgram(s) HFA Inhaler 2 Puff(s) Inhalation every 6 hours PRN Wheezing  ALBUTerol/ipratropium for Nebulization 3 milliLiter(s) Nebulizer every 4 hours PRN Shortness of Breath and/or Wheezing  ALPRAZolam 0.5 milliGRAM(s) Oral three times a day PRN anxiety  dextrose 40% Gel 15 Gram(s) Oral once PRN Blood Glucose LESS THAN 70 milliGRAM(s)/deciliter  glucagon  Injectable 1 milliGRAM(s) IntraMuscular once PRN Glucose LESS THAN 70 milligrams/deciliter  oxyCODONE    IR 5 milliGRAM(s) Oral every 4 hours PRN mild - moderate pain  oxyCODONE    IR 10 milliGRAM(s) Oral every 4 hours PRN Severe Pain (7 - 10)  prochlorperazine   Tablet 10 milliGRAM(s) Oral every 8 hours PRN nausea    Vital Signs Last 24 Hrs  T(F): 98.5 (28 Aug 2019 07:52), Max: 98.9 (28 Aug 2019 00:00)  HR: 84 (28 Aug 2019 09:42) (76 - 88)  BP: 151/69 (28 Aug 2019 07:52) (108/56 - 151/69)  RR: 18 (28 Aug 2019 07:52) (18 - 20)  SpO2: 97% (28 Aug 2019 09:42) (95% - 97%)  I&O's Summary    27 Aug 2019 07:01  -  28 Aug 2019 07:00  --------------------------------------------------------  IN: 720 mL / OUT: 1500 mL / NET: -780 mL    28 Aug 2019 07:01  -  28 Aug 2019 16:26  --------------------------------------------------------  IN: 0 mL / OUT: 250 mL / NET: -250 mL      PHYSICAL EXAM:  General: NAD, Elderly debilitated  ENT: MMM, no thrush  Neck: Supple, No JVD  Lungs: decreased breath sounds b/l bases  Cardio: +s1/s2  Abdomen: Soft, Nontender, Nondistended; Bowel sounds present  Extremities: No calf tenderness, No pitting edema    LABS:                        10.0   14.49 )-----------( 334      ( 28 Aug 2019 15:37 )             31.9     Glucose  POCT Blood Glucose.: 214 mg/dL (28 Aug 2019 11:52)  POCT Blood Glucose.: 217 mg/dL (28 Aug 2019 08:11)  POCT Blood Glucose.: 226 mg/dL (27 Aug 2019 22:41)  POCT Blood Glucose.: 227 mg/dL (27 Aug 2019 16:58)    08-25 [id number] 5.8  06-26 KynhfdkjylB5L 6.6    RADIOLOGY & ADDITIONAL TESTS:  < from: Xray Chest 2 Views PA/Lat (08.28.19 @ 14:28) >   Frontal and lateral views of the chest were obtained and   compared to the prior study dated 8/22/2019. The cardiac silhouette is   normal.  There is vascular congestion with bibasilar atelectatic changes   or infiltrates and small bilateral effusions. There is also a right upper   lung infiltrate. There is no pneumothorax. There is a small metallic   fiducial marker superimposed on the right lung apex, unchanged. The   osseous structures are significant for prior right humerus ORIF.  < end of copied text >    < from: CT Angio Chest w/ IV Cont (08.23.19 @ 17:33) >  IMPRESSION:   No evidence of acute pulmonary embolism.  Fiducial marker in the anterior right upper lobe with associated nodular   soft tissue density in adjacent airspace disease unchanged from 4/9/2019.   Additional right upper lobe nodular density measuring 0.8 cm is unchanged.  Small bilateral pleural effusions with bibasilar atelectasis. Moderate to   severe centrilobular emphysema.  < end of copied text >

## 2019-08-28 NOTE — DISCHARGE NOTE NURSING/CASE MANAGEMENT/SOCIAL WORK - PATIENT PORTAL LINK FT
You can access the FollowMyHealth Patient Portal offered by [facility] by registering at the following website: [email or web address]. By joining [employer]’s FollowMyHealth portal, you will also be able to view your health information using other applications (apps) compatible with our system.

## 2019-08-30 ENCOUNTER — CHART COPY (OUTPATIENT)
Age: 79
End: 2019-08-30

## 2019-08-30 ENCOUNTER — OUTPATIENT (OUTPATIENT)
Dept: OUTPATIENT SERVICES | Facility: HOSPITAL | Age: 79
LOS: 1 days | Discharge: HOME | End: 2019-08-30

## 2019-08-30 DIAGNOSIS — Z90.49 ACQUIRED ABSENCE OF OTHER SPECIFIED PARTS OF DIGESTIVE TRACT: Chronic | ICD-10-CM

## 2019-08-30 DIAGNOSIS — C34.90 MALIGNANT NEOPLASM OF UNSPECIFIED PART OF UNSPECIFIED BRONCHUS OR LUNG: ICD-10-CM

## 2019-08-30 DIAGNOSIS — C32.0 MALIGNANT NEOPLASM OF GLOTTIS: Chronic | ICD-10-CM

## 2019-08-30 DIAGNOSIS — C79.51 SECONDARY MALIGNANT NEOPLASM OF BONE: ICD-10-CM

## 2019-08-30 DIAGNOSIS — D63.1 ANEMIA IN CHRONIC KIDNEY DISEASE: ICD-10-CM

## 2019-08-30 DIAGNOSIS — Z96.659 PRESENCE OF UNSPECIFIED ARTIFICIAL KNEE JOINT: Chronic | ICD-10-CM

## 2019-08-30 DIAGNOSIS — N18.3 CHRONIC KIDNEY DISEASE, STAGE 3 (MODERATE): ICD-10-CM

## 2019-09-03 PROBLEM — G90.511 COMPLEX REGIONAL PAIN SYNDROME OF RIGHT UPPER EXTREMITY: Status: ACTIVE | Noted: 2019-09-03

## 2019-09-03 LAB — GLUCOSE BLDC GLUCOMTR-MCNC: 237 MG/DL — HIGH (ref 70–99)

## 2019-09-04 ENCOUNTER — APPOINTMENT (OUTPATIENT)
Age: 79
End: 2019-09-04

## 2019-09-04 ENCOUNTER — APPOINTMENT (OUTPATIENT)
Dept: HEMATOLOGY ONCOLOGY | Facility: CLINIC | Age: 79
End: 2019-09-04

## 2019-09-05 ENCOUNTER — LABORATORY RESULT (OUTPATIENT)
Age: 79
End: 2019-09-05

## 2019-09-05 ENCOUNTER — APPOINTMENT (OUTPATIENT)
Dept: HEMATOLOGY ONCOLOGY | Facility: CLINIC | Age: 79
End: 2019-09-05
Payer: MEDICARE

## 2019-09-05 ENCOUNTER — RESULT REVIEW (OUTPATIENT)
Age: 79
End: 2019-09-05

## 2019-09-05 ENCOUNTER — APPOINTMENT (OUTPATIENT)
Age: 79
End: 2019-09-05

## 2019-09-05 VITALS
HEIGHT: 70 IN | SYSTOLIC BLOOD PRESSURE: 97 MMHG | OXYGEN SATURATION: 92 % | DIASTOLIC BLOOD PRESSURE: 62 MMHG | HEART RATE: 119 BPM

## 2019-09-05 DIAGNOSIS — C34.91 MALIGNANT NEOPLASM OF UNSPECIFIED PART OF RIGHT BRONCHUS OR LUNG: ICD-10-CM

## 2019-09-05 DIAGNOSIS — G90.511 COMPLEX REGIONAL PAIN SYNDROME I OF RIGHT UPPER LIMB: ICD-10-CM

## 2019-09-05 LAB
BASOPHILS # BLD AUTO: 0 K/UL — SIGNIFICANT CHANGE UP (ref 0–0.2)
BASOPHILS NFR BLD AUTO: 0.2 % — SIGNIFICANT CHANGE UP (ref 0–2)
EOSINOPHIL # BLD AUTO: 0.1 K/UL — SIGNIFICANT CHANGE UP (ref 0–0.5)
EOSINOPHIL NFR BLD AUTO: 0.5 % — SIGNIFICANT CHANGE UP (ref 0–6)
HCT VFR BLD CALC: 35.7 % — LOW (ref 39–50)
HGB BLD-MCNC: 10.8 G/DL — LOW (ref 13–17)
LYMPHOCYTES # BLD AUTO: 0.9 K/UL — LOW (ref 1–3.3)
LYMPHOCYTES # BLD AUTO: 5.3 % — LOW (ref 13–44)
MCHC RBC-ENTMCNC: 26.6 PG — LOW (ref 27–34)
MCHC RBC-ENTMCNC: 30.4 G/DL — LOW (ref 32–36)
MCV RBC AUTO: 87.5 FL — SIGNIFICANT CHANGE UP (ref 80–100)
MONOCYTES # BLD AUTO: 0.7 K/UL — SIGNIFICANT CHANGE UP (ref 0–0.9)
MONOCYTES NFR BLD AUTO: 4.2 % — SIGNIFICANT CHANGE UP (ref 2–14)
NEUTROPHILS # BLD AUTO: 15.7 K/UL — HIGH (ref 1.8–7.4)
NEUTROPHILS NFR BLD AUTO: 89.8 % — HIGH (ref 43–77)
PLATELET # BLD AUTO: 350 K/UL — SIGNIFICANT CHANGE UP (ref 150–400)
RBC # BLD: 4.07 M/UL — LOW (ref 4.2–5.8)
RBC # FLD: 17.7 % — HIGH (ref 10.3–14.5)
WBC # BLD: 17.5 K/UL — HIGH (ref 3.8–10.5)
WBC # FLD AUTO: 17.5 K/UL — HIGH (ref 3.8–10.5)

## 2019-09-05 PROCEDURE — 99213 OFFICE O/P EST LOW 20 MIN: CPT

## 2019-09-06 PROBLEM — C34.91 ADENOCARCINOMA OF LUNG, RIGHT: Status: ACTIVE | Noted: 2017-07-05

## 2019-09-06 NOTE — PHYSICAL EXAM
[Normal] : affect appropriate [de-identified] : Ill-appearing, in wheelchair, with rapid clinical decline [de-identified] : Wheelchair-bound

## 2019-09-06 NOTE — HISTORY OF PRESENT ILLNESS
[de-identified] : Patient returns for follow up.\par Reports he is in a lot of pain. \par Currently on fentanyl patch and oxycodone. \par He is weak, unable to walk, wheelchair bound and currently in physical therapy. \par He has loss of appetite and difficulty chewing and swallowing. \par 1 month after surgery wife noticed unusual bathroom habits. \par Wife has been noticing abnormal body jerks.\par No other complaints today. \par  [de-identified] : Markedly fatigued.\par Pain is gradually decreasing, following the recent wide resection metastatic adenocarcinoma of the lung to the proximal right humerus, and the internal fixation and locking plate performed by .\par Recently hospitalized, performance status rapidly declining - now in Rehab facility.\par

## 2019-09-06 NOTE — REVIEW OF SYSTEMS
[Fatigue] : fatigue [Negative] : Heme/Lymph [FreeTextEntry2] : loss of appetite  [Fever] : no fever [FreeTextEntry9] : pain [de-identified] : Wheelchair, profound fatigue

## 2019-09-06 NOTE — ASSESSMENT
[FreeTextEntry1] : Adenocarcinoma of the lung, metastatic to bone, with recent successful, surgery, to resect painful metastatic disease in the right proximal humerus. Rapid clinical decline, currently in Rehab facility.\par \par Plan:\par -Recommend in home hospice care, discussed with Social Work today.\par No further systemic therapy planned.

## 2019-09-10 ENCOUNTER — APPOINTMENT (OUTPATIENT)
Age: 79
End: 2019-09-10

## 2019-09-11 ENCOUNTER — APPOINTMENT (OUTPATIENT)
Dept: HEMATOLOGY ONCOLOGY | Facility: CLINIC | Age: 79
End: 2019-09-11

## 2019-09-16 ENCOUNTER — APPOINTMENT (OUTPATIENT)
Dept: INTERNAL MEDICINE | Facility: CLINIC | Age: 79
End: 2019-09-16

## 2019-09-16 ENCOUNTER — CHART COPY (OUTPATIENT)
Age: 79
End: 2019-09-16

## 2019-09-17 ENCOUNTER — APPOINTMENT (OUTPATIENT)
Dept: NEUROLOGY | Facility: CLINIC | Age: 79
End: 2019-09-17

## 2019-09-18 ENCOUNTER — MEDICATION RENEWAL (OUTPATIENT)
Age: 79
End: 2019-09-18

## 2019-09-18 ENCOUNTER — APPOINTMENT (OUTPATIENT)
Age: 79
End: 2019-09-18

## 2019-09-19 ENCOUNTER — MEDICATION RENEWAL (OUTPATIENT)
Age: 79
End: 2019-09-19

## 2019-09-19 RX ORDER — ERGOCALCIFEROL 1.25 MG/1
1.25 MG CAPSULE, LIQUID FILLED ORAL
Qty: 12 | Refills: 3 | Status: ACTIVE | COMMUNITY
Start: 2017-07-26 | End: 1900-01-01

## 2019-09-19 RX ORDER — TAMSULOSIN HYDROCHLORIDE 0.4 MG/1
0.4 CAPSULE ORAL
Qty: 90 | Refills: 2 | Status: ACTIVE | COMMUNITY
Start: 2017-06-19 | End: 1900-01-01

## 2019-09-19 RX ORDER — FLUOXETINE HYDROCHLORIDE 20 MG/1
20 CAPSULE ORAL DAILY
Qty: 90 | Refills: 1 | Status: ACTIVE | COMMUNITY
Start: 2018-07-25 | End: 1900-01-01

## 2019-09-19 RX ORDER — PEN NEEDLE, DIABETIC 29 G X1/2"
32G X 4 MM NEEDLE, DISPOSABLE MISCELLANEOUS
Qty: 1 | Refills: 1 | Status: ACTIVE | COMMUNITY
Start: 2019-09-19 | End: 1900-01-01

## 2019-09-24 ENCOUNTER — APPOINTMENT (OUTPATIENT)
Age: 79
End: 2019-09-24

## 2019-10-02 ENCOUNTER — APPOINTMENT (OUTPATIENT)
Age: 79
End: 2019-10-02

## 2019-10-08 NOTE — PHYSICAL EXAM
[Capable of only limited self care, confined to bed or chair more than 50% of waking hours] : Status 3- Capable of only limited self care, confined to bed or chair more than 50% of waking hours [Normal] : affect appropriate [de-identified] : tenderness to light touch in Right upper arm, near incision site, Upper border of inscicion is red and warm,  and hard

## 2019-10-08 NOTE — HISTORY OF PRESENT ILLNESS
[de-identified] : Mr. 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 Denny quit smoking in 1996. He is a retired  with asbestos exposure and pleural plaques on CXR.\par In August 2017 he completed 5000 cGy for a [id number] adenocarcinoma of the RUL lung. A PET scan in 12/17 showed the RUL nodule smaller and less PET-avid, but a new RUL nodule and new right paratracheal adenopathy were seen as well as a new right proximal humerus lesion. Bronchoscopy and endobronchial ultrasound could not confirm malignancy on cytopathology, but nuclear bone scan confirmed the humerus lesion as metastatic. Initiated Nivolumab immunotherapy with good tolerance, along with radiation therapy to right humerus as per Dr. Ellison (planned 2000 cGy)Pain and swelling persisted in RUE and he had orthopedic procedure done on Right Humerus eraly July. [de-identified] : patient in for Nivolumab and aranesp today, I was asked to see patient because of persistent and increased pain in Right upper arm. Currently on fentanyl, Oxycodone, and being followed by Dr. Miranda for pain management.

## 2019-10-08 NOTE — ASSESSMENT
[FreeTextEntry1] : Adenocarcinoma of the lung, metastatic to bone, with recent  surgery, to resect painful metastatic disease in the right proximal humerus. He has had increase in pain in RUE over last few days, Since Dr. Miranda is following pain management , I have asked them to call DR. Miranda. I have asked them to call Dr. Richard regarding tenderness in RUE. Holding Xgeva due to Hypocalcemia with last blood work, repeating calcium level today.

## 2019-10-23 NOTE — ED CDU PROVIDER INITIAL DAY NOTE - THROAT, MLM
uvula midline, no vesicles, no redness, and no oropharyngeal exudate. Double O-Z Flap Text: The defect edges were debeveled with a #15 scalpel blade.  Given the location of the defect, shape of the defect and the proximity to free margins a Double O-Z flap was deemed most appropriate.  Using a sterile surgical marker, an appropriate transposition flap was drawn incorporating the defect and placing the expected incisions within the relaxed skin tension lines where possible. The area thus outlined was incised deep to adipose tissue with a #15 scalpel blade.  The skin margins were undermined to an appropriate distance in all directions utilizing iris scissors.

## 2019-10-25 ENCOUNTER — APPOINTMENT (OUTPATIENT)
Dept: RADIATION ONCOLOGY | Facility: CLINIC | Age: 79
End: 2019-10-25

## 2019-11-14 ENCOUNTER — APPOINTMENT (OUTPATIENT)
Dept: PULMONOLOGY | Facility: CLINIC | Age: 79
End: 2019-11-14

## 2019-11-19 ENCOUNTER — APPOINTMENT (OUTPATIENT)
Dept: ORTHOPEDIC SURGERY | Facility: CLINIC | Age: 79
End: 2019-11-19

## 2019-12-02 ENCOUNTER — APPOINTMENT (OUTPATIENT)
Dept: OPHTHALMOLOGY | Facility: CLINIC | Age: 79
End: 2019-12-02

## 2019-12-18 NOTE — ED ADULT NURSE NOTE - NS ED NURSE RECORD ANOTHER HT AND WT
Patient resting in bed- AOX4. Oxygenating 92% on 2L NC, desats to 84% with ambulation. V-paced on tele. VSS. L upper chest incision- dressing in place, CDI, no visible drainage observed. Continuing L arm precautions per orders.  Inspiratory and expiratory w electrolytes and administer replacement therapy as ordered  Outcome: Progressing     Problem: RESPIRATORY - ADULT  Goal: Achieves optimal ventilation and oxygenation  Description  INTERVENTIONS:  - Assess for changes in respiratory status  - Assess for sapphire Yes

## 2021-07-29 NOTE — H&P PST ADULT - VASCULAR
Spine appears normal, range of motion is not limited, no vert tenderness. diffuse soft tissue tenderness to right hip. no ext rotation or shortening. no pain with passive ROM. pain with active hip ROM
detailed exam

## 2021-11-29 NOTE — DISCHARGE NOTE PROVIDER - NSDCDCMDCOMP_GEN_ALL_CORE
63 year old male with multiple myeloma admitted for an autologous pbsct with high dose melphalan prep regimen.     s/p HPC Transplant on 11/19    Noted to be hypotensive and encephalopathic on AM of 11/23.     CT A/P (11/23) with findings concerning for ileitis and Pockets of free air in the right lower quadrant adjacent to the terminal ileum concerning for bowel perforation and 2.6 cm loculated fluid adjacent to the terminal ileum and surrounding peritoneal enhancement suggestive of developing peritonitis.    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 and required thrombectomy    Was not taken for operative management given high risk in context of pancytopenia and saddle PE    Prognosis is guarded    Antibiotics:  Meropenem: 11/23 -->  Fluconazole: 11/15 -->   PO Bactrim: 11/15 --> 11/17  Ciprofloxacin: 11/22 --> 11/23  Vancomycin: 11/23  Metronidazole: 11/23  Aztreonam: 11/23    #Bowel Perforation, Peritonitis, Abdominal Fluid Collection, Hypotension, Pancytopenia, Penicillin Allergy  --Followup on CT A/P with IV contrast read to help guide antibiotic duration  --Continue Meropenem 1g IV Q8H   --Continue Fluconazole 400 mg PO/IV Q24H (treatment given small bowel perforation and for prophylaxis for SCT)  --Continue to follow CBC with diff  --Continue to follow transaminases  --Continue to follow temperature curve  --Follow up on preliminary blood cultures  --Surgery recommendations noted/appreciated    #s/p Stem Cell Transplant  --Continue Acyclovir prophylaxis; transitioned to IV - would maintain IVF hydration to decrease risk of crystal induced kidney injury  --Will require PCP prophylaxis - can monitor off for now.     I will continue to follow. Please feel free to contact me with any further questions.    Kris Rothman M.D.  [facility] Division of Infectious Disease  8AM-5PM: Pager Number [phone]  After Hours (or if no response): Please contact the Infectious Diseases Office at [phone]     The above assessment and plan were discussed with 8ICU Team    This document is complete and the patient is ready for discharge.

## 2021-12-30 NOTE — PRE-OP CHECKLIST - ALLERGIES REVIEWED
Pt seen for colonoscopy 2 yrs ago and at that time she did show Dr Woodruff a rather large umbilical hernia.    But pt states she is having digestion issues.    Advised to follow up the digestion issues with PCP   And if wishes surgical opinion from Surgery, to make an appointment and they can evaluate any changes and order any diagnostics.    Karen LARA   [facility]  
Reason for Call:  Other call back    Detailed comments: Patient called and stated she is having lower intestinal pain and is not sure if it is from the hernia she has, she states Dr Woodruff told her to call if it starts to bother her.     Phone Number Patient can be reached at: Home number on file [phone] (home)    Best Time:     Can we leave a detailed message on this number? YES    Call taken on 12/30/2021 at 2:21 PM by Nguyen Contreras MA      
done
done

## 2023-01-01 NOTE — PROGRESS NOTE ADULT - SUBJECTIVE AND OBJECTIVE BOX
pt reports falling about 4-5 ft from a ladder onto his back. pt is c/o pain to his lower back and left knee and difficulty walking. Patient is a 78y old  Male who presents with a chief complaint of Hyponatremia (2019 06:12)    SUBJECTIVE / OVERNIGHT EVENTS: No overnight events. Patient reports feeling at his baseline this morning. Denies chest pain, SOB, palpitations or fevers or chills. States that he did not get insulin overnight. Reports chronic LE edema which is worse at the end of the day. Patient denies any prior cardiac history.     MEDICATIONS  (STANDING):  dextrose 5%. 1000 milliLiter(s) (50 mL/Hr) IV Continuous <Continuous>  dextrose 50% Injectable 12.5 Gram(s) IV Push once  dextrose 50% Injectable 25 Gram(s) IV Push once  dextrose 50% Injectable 25 Gram(s) IV Push once  docusate sodium 100 milliGRAM(s) Oral three times a day  fentaNYL   Patch  75 MICROgram(s)/Hr 1 Patch Transdermal every 72 hours  furosemide    Tablet 20 milliGRAM(s) Oral daily  gabapentin 800 milliGRAM(s) Oral Once  insulin lispro (HumaLOG) corrective regimen sliding scale   SubCutaneous every 6 hours  levothyroxine 112 MICROGram(s) Oral daily  senna 2 Tablet(s) Oral at bedtime  simvastatin 20 milliGRAM(s) Oral at bedtime  tamsulosin 0.4 milliGRAM(s) Oral at bedtime    MEDICATIONS  (PRN):  acetaminophen   Tablet .. 650 milliGRAM(s) Oral every 6 hours PRN Mild Pain (1 - 3)  ALBUTerol    90 MICROgram(s) HFA Inhaler 2 Puff(s) Inhalation every 6 hours PRN Bronchospasm  dextrose 40% Gel 15 Gram(s) Oral once PRN Blood Glucose LESS THAN 70 milliGRAM(s)/deciliter  glucagon  Injectable 1 milliGRAM(s) IntraMuscular once PRN Glucose LESS THAN 70 milligrams/deciliter  oxyCODONE    IR 2.5 milliGRAM(s) Oral every 4 hours PRN Moderate Pain (4 - 6)  oxyCODONE    IR 5 milliGRAM(s) Oral every 4 hours PRN Severe Pain (7 - 10)        Vital Signs Last 24 Hrs  T(C): 37 (2019 08:45), Max: 37 (2019 08:45)  T(F): 98.6 (2019 08:45), Max: 98.6 (2019 08:45)  HR: 68 (2019 08:45) (62 - 92)  BP: 137/43 (2019 08:45) (123/49 - 164/71)  BP(mean): --  RR: 16 (2019 08:45) (16 - 18)  SpO2: 96% (2019 08:45) (94% - 98%)      POCT Blood Glucose.: 240 mg/dL (2019 05:59)  POCT Blood Glucose.: 185 mg/dL (2019 13:59)    I&O's Summary    2019 07:01  -  2019 07:00  --------------------------------------------------------  IN: 0 mL / OUT: 400 mL / NET: -400 mL        PHYSICAL EXAM:  GENERAL: NAD, well-developed, elderly appearing  HEAD:  Atraumatic, Normocephalic  EYES: EOMI, PERRLA, conjunctiva and sclera clear  NECK: Supple,  CHEST/LUNG: decreased BS at bases, mostly clear lungs   HEART: Regular rate and rhythm  ABDOMEN: Soft, Nontender, Nondistended; Bowel sounds present  EXTREMITIES:  2+ edema b/l with chronic venous changes, right arm in sling  PSYCH: AAOx3  NEUROLOGY: non-focal  SKIN: chronic venous skin changes of LE    LABS:                        9.4    10.25 )-----------( 310      ( 2019 17:52 )             29.8     07-02    129<L>  |  93<L>  |  31<H>  ----------------------------<  255<H>  4.7   |  24  |  1.36<H>    Ca    9.4      2019 10:16  Mg     1.9     07-02      PT/INR - ( 2019 17:52 )   PT: 15.1 SEC;   INR: 1.35          PTT - ( 2019 17:52 )  PTT:29.2 SEC      Urinalysis Basic - ( 2019 06:20 )    Color: LIGHT YELLOW / Appearance: CLEAR / S.013 / pH: 5.5  Gluc: NEGATIVE / Ketone: NEGATIVE  / Bili: NEGATIVE / Urobili: NORMAL   Blood: NEGATIVE / Protein: NEGATIVE / Nitrite: NEGATIVE   Leuk Esterase: NEGATIVE / RBC: 0-2 / WBC 0-2   Sq Epi: x / Non Sq Epi: x / Bacteria: x    RADIOLOGY & ADDITIONAL TESTS: < from: Transthoracic Echocardiogram (19 @ 07:54) >  3. Normal left ventricular internal dimensionsand wall  thicknesses.    < end of copied text >      Imaging Personally Reviewed: < from: Xray Chest 1 View- PORTABLE-Urgent (19 @ 19:11) >  1. No focal consolidation.  2. Bilateral trace effusions.  3. Large hiatal hernia.    < end of copied text >      Consultant(s) Notes Reviewed:  cardiology    Care Discussed with Consultants/Other Providers: cardiology and ortho    Assessment and Plan: (2) more than 100 beats/min Patient is a 78y old  Male who presents with a chief complaint of Hyponatremia (2019 06:12)    SUBJECTIVE / OVERNIGHT EVENTS: No overnight events. Patient reports feeling at his baseline this morning. Denies chest pain, SOB, palpitations or fevers or chills. States that he did not get insulin overnight. Reports chronic LE edema which is worse at the end of the day. Patient denies any prior cardiac history.     MEDICATIONS  (STANDING):  dextrose 5%. 1000 milliLiter(s) (50 mL/Hr) IV Continuous <Continuous>  dextrose 50% Injectable 12.5 Gram(s) IV Push once  dextrose 50% Injectable 25 Gram(s) IV Push once  dextrose 50% Injectable 25 Gram(s) IV Push once  docusate sodium 100 milliGRAM(s) Oral three times a day  fentaNYL   Patch  75 MICROgram(s)/Hr 1 Patch Transdermal every 72 hours  furosemide    Tablet 20 milliGRAM(s) Oral daily  gabapentin 800 milliGRAM(s) Oral Once  insulin lispro (HumaLOG) corrective regimen sliding scale   SubCutaneous every 6 hours  levothyroxine 112 MICROGram(s) Oral daily  senna 2 Tablet(s) Oral at bedtime  simvastatin 20 milliGRAM(s) Oral at bedtime  tamsulosin 0.4 milliGRAM(s) Oral at bedtime    MEDICATIONS  (PRN):  acetaminophen   Tablet .. 650 milliGRAM(s) Oral every 6 hours PRN Mild Pain (1 - 3)  ALBUTerol    90 MICROgram(s) HFA Inhaler 2 Puff(s) Inhalation every 6 hours PRN Bronchospasm  dextrose 40% Gel 15 Gram(s) Oral once PRN Blood Glucose LESS THAN 70 milliGRAM(s)/deciliter  glucagon  Injectable 1 milliGRAM(s) IntraMuscular once PRN Glucose LESS THAN 70 milligrams/deciliter  oxyCODONE    IR 2.5 milliGRAM(s) Oral every 4 hours PRN Moderate Pain (4 - 6)  oxyCODONE    IR 5 milliGRAM(s) Oral every 4 hours PRN Severe Pain (7 - 10)        Vital Signs Last 24 Hrs  T(C): 37 (2019 08:45), Max: 37 (2019 08:45)  T(F): 98.6 (2019 08:45), Max: 98.6 (2019 08:45)  HR: 68 (2019 08:45) (62 - 92)  BP: 137/43 (2019 08:45) (123/49 - 164/71)  BP(mean): --  RR: 16 (2019 08:45) (16 - 18)  SpO2: 96% (2019 08:45) (94% - 98%)      POCT Blood Glucose.: 240 mg/dL (2019 05:59)  POCT Blood Glucose.: 185 mg/dL (2019 13:59)    I&O's Summary    2019 07:01  -  2019 07:00  --------------------------------------------------------  IN: 0 mL / OUT: 400 mL / NET: -400 mL        PHYSICAL EXAM:  GENERAL: NAD, well-developed, elderly appearing  HEAD:  Atraumatic, Normocephalic  EYES: EOMI, PERRLA, conjunctiva and sclera clear  NECK: Supple,  CHEST/LUNG: decreased BS at bases, mostly clear lungs   HEART: Regular rate and rhythm  ABDOMEN: Soft, Nontender, Nondistended; Bowel sounds present  EXTREMITIES:  2+ edema b/l with chronic venous changes, right arm in sling  PSYCH: AAOx3  NEUROLOGY: non-focal  SKIN: chronic venous skin changes of LE    LABS:                        9.4    10.25 )-----------( 310      ( 2019 17:52 )             29.8     07-02    129<L>  |  93<L>  |  31<H>  ----------------------------<  255<H>  4.7   |  24  |  1.36<H>    Ca    9.4      2019 10:16  Mg     1.9     07-02      PT/INR - ( 2019 17:52 )   PT: 15.1 SEC;   INR: 1.35          PTT - ( 2019 17:52 )  PTT:29.2 SEC      Urinalysis Basic - ( 2019 06:20 )    Color: LIGHT YELLOW / Appearance: CLEAR / S.013 / pH: 5.5  Gluc: NEGATIVE / Ketone: NEGATIVE  / Bili: NEGATIVE / Urobili: NORMAL   Blood: NEGATIVE / Protein: NEGATIVE / Nitrite: NEGATIVE   Leuk Esterase: NEGATIVE / RBC: 0-2 / WBC 0-2   Sq Epi: x / Non Sq Epi: x / Bacteria: x    EK NSR with 1st degree AVB - largely unchanged from EKG 2018    RADIOLOGY & ADDITIONAL TESTS: < from: Transthoracic Echocardiogram (19 @ 07:54) >  3. Normal left ventricular internal dimensionsand wall  thicknesses.    < end of copied text >      Imaging Personally Reviewed: < from: Xray Chest 1 View- PORTABLE-Urgent (19 @ 19:11) >  1. No focal consolidation.  2. Bilateral trace effusions.  3. Large hiatal hernia.    < end of copied text >      Consultant(s) Notes Reviewed:  cardiology    Care Discussed with Consultants/Other Providers: cardiology and ortho    Assessment and Plan:

## 2023-10-01 PROBLEM — Z92.3 HISTORY OF RADIATION THERAPY: Status: ACTIVE | Noted: 2019-04-05

## 2023-11-27 NOTE — CONSULT NOTE ADULT - PROBLEM SELECTOR RECOMMENDATION 3
High Anion Gap related to CKD  Sodium Bicarb Infusion Started Island Pedicle Flap Text: The defect edges were debeveled with a #15 scalpel blade. Given the location of the defect, shape of the defect and the proximity to free margins an island pedicle advancement flap was deemed most appropriate. Using a sterile surgical marker, an appropriate advancement flap was drawn incorporating the defect, outlining the appropriate donor tissue and placing the expected incisions within the relaxed skin tension lines where possible. The area thus outlined was incised deep to adipose tissue with a #15 scalpel blade. The skin margins were undermined to an appropriate distance in all directions around the primary defect and laterally outward around the island pedicle utilizing iris scissors.  There was minimal undermining beneath the pedicle flap. Following this, the flap was carried over into the primary defect and sutured into place.

## 2024-01-24 NOTE — ASU PREOP CHECKLIST - WARM FLUIDS/WARM BLANKETS
Pt tolerated injection (Covid 19 12+ Pfizer Comirnaty Vaccine). Site (left deltoid) was cleansed with alcohol prior to injections. No pain, burning, swelling or redness at the site of the injection. Patient instructed to remain in clinic for 15 minutes afterwards, and to report any adverse reactions   no

## 2024-03-07 NOTE — PROGRESS NOTE ADULT - PROBLEM SELECTOR PLAN 1
Acute of chronic kidney failure stage 4. Improving  Renal U/S was unremarkable for hydronephrosis, mass, or stones.  CT Abd/pelvis: No acute findings. Bilateral perinephric fat stranding. Bladder   moderately distended, mildly enlarged prostate, correlate with urine   output and PSA levels.  UA showed 100 protein, large blood, few bacteria, trace leukocyte esterase, 11-25 RBC  Urine culture no growth  Renal Dr Peters following  Sodium Bicarb infusion  Rodney catheter  Strict I/O's Acute of chronic kidney failure stage 4. Improving  Renal U/S was unremarkable for hydronephrosis, mass, or stones.  CT Abd/pelvis: No acute findings. Bilateral perinephric fat stranding. Bladder   moderately distended, mildly enlarged prostate, correlate with urine   output and PSA levels.  UA showed 100 protein, large blood, few bacteria, trace leukocyte esterase, 11-25 RBC  Urine culture no growth  Renal Dr Peters following  Sodium Bicarb infusion  Consider removing thakur for a trial of void if BUN/Cr continues to improve  Strict I/O's Acute of chronic kidney failure stage 4. Improving creatine and acidosis   -Renal U/S was unremarkable for hydronephrosis, mass, or stones.  -CT Abd/pelvis: No acute findings. Bilateral perinephric fat stranding. Bladder   moderately distended, mildly enlarged prostate.  -Empirically tx for UTI will c/w rocephin 1G IVPB QD D#4/5 will d/c abx in the am -Urine culture shows no growth  -Neprho f/u noted and appreciated and d/w Dr. Ward in regards to the plan of care and sodium bicarb IVF discontinued and switched to sodium bicarbonate po tid. Also discontinued thakur catheter for TOV and if patient does not void > 8 hrs will perform bladder scan and re-insert thakur if needed. -c/w flomax 0.4mg po qhs Acute of chronic kidney failure stage 4. Improving creatine and acidosis   -Renal U/S was unremarkable for hydronephrosis, mass, or stones.  -CT Abd/pelvis: No acute findings. Bilateral perinephric fat stranding. Bladder   moderately distended, mildly enlarged prostate.  -Empirically tx for UTI given UA results  will c/w rocephin 1G IVPB QD D#4/5 will d/c abx in the am -Urine culture shows no growth  -Neprho f/u noted and appreciated and d/w Dr. Ward in regards to the plan of care and sodium bicarb IVF discontinued and switched to sodium bicarbonate po tid. Also discontinued thakur catheter for TOV and if patient does not void > 8 hrs will perform bladder scan and re-insert thakur if needed. -c/w flomax 0.4mg po qhs no body aches/no edema/no headache/no hemoptysis

## 2025-02-19 NOTE — PROGRESS NOTE ADULT - PROBLEM SELECTOR PLAN 1
[facility] Group Therapy Note      2/18/2025    Location:  [place]      Clients Presents: [id number], 1744, 1749, 1720, 1610, 1769, 1538    Clients Absent: 1751, 1762, 1729    Length of session: 1.5 hours    Group Note: OP    Group Type: Co-Ed    New members were welcomed and introduced.  Norms and expectations of group were discussed.    Content: Counselor presented a topic focused discussion on the 4 different communication styes. Client identified his/her main communication style and reported if it was beneficial to relationships.     CAMI DelarosaIII  2/18/2025 7:00 PM    Co-Therapist: N/A      [facility] Individual Group Progress Note    Moiz Mooney  1975 2/19/2025    Notes on Client Progress in Group    Client shared he is making progress on his goals in treatment. He reports the anniversary of Quentin his dog is coming up and he is depressed. He denies any use or cravings.   Client participated in group discussion on communication. He reports his ex wife and he had many arguments where he used excalation and put downs. Now he uses avoidence.    CAMI DelarosaIII  2/19/2025 7:58 AM    Co-Therapist: N/A     Improving. Post renal obstruction (Obstructive Uropathy)  Medically optimized.  Outpt follow up upon discharge by primary team Improving. Post renal obstruction (Obstructive Uropathy)  Medically optimized.  Outpt follow up upon discharge by primary team  D/w patient and wife

## 2025-07-18 NOTE — ED ADULT NURSE NOTE - EXTENSIONS OF SELF_ADULT
S/p cystoscopy with stent placement.  - will need definitive treatment of stone as outpatient     None
